# Patient Record
Sex: FEMALE | Race: WHITE | HISPANIC OR LATINO | Employment: FULL TIME | URBAN - METROPOLITAN AREA
[De-identification: names, ages, dates, MRNs, and addresses within clinical notes are randomized per-mention and may not be internally consistent; named-entity substitution may affect disease eponyms.]

---

## 2017-01-27 ENCOUNTER — COMMUNICATION - HEALTHEAST (OUTPATIENT)
Dept: ADMINISTRATIVE | Facility: CLINIC | Age: 27
End: 2017-01-27

## 2017-02-06 ENCOUNTER — COMMUNICATION - HEALTHEAST (OUTPATIENT)
Dept: MIDWIFE SERVICES | Facility: CLINIC | Age: 27
End: 2017-02-06

## 2017-02-06 DIAGNOSIS — Z34.82 ENCOUNTER FOR SUPERVISION OF OTHER NORMAL PREGNANCY IN SECOND TRIMESTER: ICD-10-CM

## 2017-02-07 ENCOUNTER — HOSPITAL ENCOUNTER (OUTPATIENT)
Dept: ULTRASOUND IMAGING | Facility: CLINIC | Age: 27
Discharge: HOME | End: 2017-02-07
Attending: ADVANCED PRACTICE MIDWIFE

## 2017-02-07 ENCOUNTER — COMMUNICATION - HEALTHEAST (OUTPATIENT)
Dept: TELEHEALTH | Facility: CLINIC | Age: 27
End: 2017-02-07

## 2017-02-07 DIAGNOSIS — Z34.82 ENCOUNTER FOR SUPERVISION OF OTHER NORMAL PREGNANCY IN SECOND TRIMESTER: ICD-10-CM

## 2017-02-13 ENCOUNTER — PRENATAL OFFICE VISIT - HEALTHEAST (OUTPATIENT)
Dept: MIDWIFE SERVICES | Facility: CLINIC | Age: 27
End: 2017-02-13

## 2017-02-13 DIAGNOSIS — Z34.80 SUPERVISION OF OTHER NORMAL PREGNANCY, ANTEPARTUM: ICD-10-CM

## 2017-02-13 DIAGNOSIS — O09.899 RUBELLA NON-IMMUNE STATUS, ANTEPARTUM: ICD-10-CM

## 2017-02-13 DIAGNOSIS — Z28.39 RUBELLA NON-IMMUNE STATUS, ANTEPARTUM: ICD-10-CM

## 2017-02-13 DIAGNOSIS — F32.89 DEPRESSIVE DISORDER, NOT ELSEWHERE CLASSIFIED: ICD-10-CM

## 2017-02-13 DIAGNOSIS — O09.30 LATE PRENATAL CARE: ICD-10-CM

## 2017-02-13 DIAGNOSIS — J45.909 ASTHMA: ICD-10-CM

## 2017-02-13 DIAGNOSIS — F41.1 GENERALIZED ANXIETY DISORDER: ICD-10-CM

## 2017-02-13 ASSESSMENT — MIFFLIN-ST. JEOR: SCORE: 1374.52

## 2017-02-15 ENCOUNTER — COMMUNICATION - HEALTHEAST (OUTPATIENT)
Dept: MIDWIFE SERVICES | Facility: CLINIC | Age: 27
End: 2017-02-15

## 2017-02-16 ENCOUNTER — AMBULATORY - HEALTHEAST (OUTPATIENT)
Dept: MIDWIFE SERVICES | Facility: CLINIC | Age: 27
End: 2017-02-16

## 2017-02-16 DIAGNOSIS — G43.909 MIGRAINE HEADACHE: ICD-10-CM

## 2017-02-17 ENCOUNTER — AMBULATORY - HEALTHEAST (OUTPATIENT)
Dept: LAB | Facility: CLINIC | Age: 27
End: 2017-02-17

## 2017-02-17 DIAGNOSIS — Z34.80 SUPERVISION OF OTHER NORMAL PREGNANCY, ANTEPARTUM: ICD-10-CM

## 2017-02-17 LAB — HIV 1+2 AB+HIV1 P24 AG SERPL QL IA: NEGATIVE

## 2017-02-18 ENCOUNTER — AMBULATORY - HEALTHEAST (OUTPATIENT)
Dept: OBGYN | Facility: CLINIC | Age: 27
End: 2017-02-18

## 2017-02-18 DIAGNOSIS — G43.909 MIGRAINE HEADACHE: ICD-10-CM

## 2017-02-18 LAB
HBV SURFACE AG SERPL QL IA: NEGATIVE
SYPHILIS RPR SCREEN - HISTORICAL: NORMAL

## 2017-02-20 LAB — HCV AB SERPL QL IA: NEGATIVE

## 2017-02-27 ENCOUNTER — COMMUNICATION - HEALTHEAST (OUTPATIENT)
Dept: OBGYN | Facility: CLINIC | Age: 27
End: 2017-02-27

## 2017-03-13 ENCOUNTER — PRENATAL OFFICE VISIT - HEALTHEAST (OUTPATIENT)
Dept: MIDWIFE SERVICES | Facility: CLINIC | Age: 27
End: 2017-03-13

## 2017-03-13 DIAGNOSIS — Z34.80 SUPERVISION OF OTHER NORMAL PREGNANCY, ANTEPARTUM: ICD-10-CM

## 2017-03-13 DIAGNOSIS — F41.1 GENERALIZED ANXIETY DISORDER: ICD-10-CM

## 2017-03-13 DIAGNOSIS — F32.89 DEPRESSIVE DISORDER, NOT ELSEWHERE CLASSIFIED: ICD-10-CM

## 2017-03-13 ASSESSMENT — MIFFLIN-ST. JEOR: SCORE: 1410.81

## 2017-03-14 LAB — SYPHILIS RPR SCREEN - HISTORICAL: NORMAL

## 2017-03-16 ENCOUNTER — AMBULATORY - HEALTHEAST (OUTPATIENT)
Dept: OBGYN | Facility: CLINIC | Age: 27
End: 2017-03-16

## 2017-03-17 ENCOUNTER — COMMUNICATION - HEALTHEAST (OUTPATIENT)
Dept: ADMINISTRATIVE | Facility: CLINIC | Age: 27
End: 2017-03-17

## 2017-03-27 ENCOUNTER — HOSPITAL ENCOUNTER (OUTPATIENT)
Dept: ULTRASOUND IMAGING | Facility: CLINIC | Age: 27
Discharge: HOME | End: 2017-03-27
Attending: MIDWIFE

## 2017-03-27 ENCOUNTER — COMMUNICATION - HEALTHEAST (OUTPATIENT)
Dept: ADMINISTRATIVE | Facility: CLINIC | Age: 27
End: 2017-03-27

## 2017-03-27 ENCOUNTER — PRENATAL OFFICE VISIT - HEALTHEAST (OUTPATIENT)
Dept: MIDWIFE SERVICES | Facility: CLINIC | Age: 27
End: 2017-03-27

## 2017-03-27 DIAGNOSIS — O26.893 RH NEGATIVE STATE IN ANTEPARTUM PERIOD, THIRD TRIMESTER: ICD-10-CM

## 2017-03-27 DIAGNOSIS — F32.89 DEPRESSIVE DISORDER, NOT ELSEWHERE CLASSIFIED: ICD-10-CM

## 2017-03-27 DIAGNOSIS — Z34.80 SUPERVISION OF OTHER NORMAL PREGNANCY, ANTEPARTUM: ICD-10-CM

## 2017-03-27 DIAGNOSIS — F41.1 GENERALIZED ANXIETY DISORDER: ICD-10-CM

## 2017-03-27 DIAGNOSIS — Z67.91 RH NEGATIVE STATE IN ANTEPARTUM PERIOD, THIRD TRIMESTER: ICD-10-CM

## 2017-03-27 ASSESSMENT — MIFFLIN-ST. JEOR: SCORE: 1410.81

## 2017-03-28 ENCOUNTER — COMMUNICATION - HEALTHEAST (OUTPATIENT)
Dept: ADMINISTRATIVE | Facility: CLINIC | Age: 27
End: 2017-03-28

## 2017-04-04 ENCOUNTER — COMMUNICATION - HEALTHEAST (OUTPATIENT)
Dept: ADMINISTRATIVE | Facility: CLINIC | Age: 27
End: 2017-04-04

## 2017-04-20 ENCOUNTER — COMMUNICATION - HEALTHEAST (OUTPATIENT)
Dept: MIDWIFE SERVICES | Facility: CLINIC | Age: 27
End: 2017-04-20

## 2017-04-24 ENCOUNTER — PRENATAL OFFICE VISIT - HEALTHEAST (OUTPATIENT)
Dept: MIDWIFE SERVICES | Facility: CLINIC | Age: 27
End: 2017-04-24

## 2017-04-24 DIAGNOSIS — Z34.80 SUPERVISION OF OTHER NORMAL PREGNANCY, ANTEPARTUM: ICD-10-CM

## 2017-04-24 DIAGNOSIS — M79.643 HAND PAIN: ICD-10-CM

## 2017-04-24 DIAGNOSIS — F41.1 GENERALIZED ANXIETY DISORDER: ICD-10-CM

## 2017-04-24 DIAGNOSIS — F32.89 DEPRESSIVE DISORDER, NOT ELSEWHERE CLASSIFIED: ICD-10-CM

## 2017-04-24 ASSESSMENT — MIFFLIN-ST. JEOR: SCORE: 1424.42

## 2017-04-25 ENCOUNTER — COMMUNICATION - HEALTHEAST (OUTPATIENT)
Dept: ADMINISTRATIVE | Facility: CLINIC | Age: 27
End: 2017-04-25

## 2017-04-25 ENCOUNTER — COMMUNICATION - HEALTHEAST (OUTPATIENT)
Dept: OBGYN | Facility: CLINIC | Age: 27
End: 2017-04-25

## 2017-04-25 DIAGNOSIS — K08.89 TOOTH PAIN: ICD-10-CM

## 2017-05-02 ENCOUNTER — AMBULATORY - HEALTHEAST (OUTPATIENT)
Dept: MIDWIFE SERVICES | Facility: CLINIC | Age: 27
End: 2017-05-02

## 2017-05-15 ENCOUNTER — PRENATAL OFFICE VISIT - HEALTHEAST (OUTPATIENT)
Dept: MIDWIFE SERVICES | Facility: CLINIC | Age: 27
End: 2017-05-15

## 2017-05-15 ENCOUNTER — COMMUNICATION - HEALTHEAST (OUTPATIENT)
Dept: ADMINISTRATIVE | Facility: CLINIC | Age: 27
End: 2017-05-15

## 2017-05-15 DIAGNOSIS — Z34.80 SUPERVISION OF OTHER NORMAL PREGNANCY, ANTEPARTUM: ICD-10-CM

## 2017-05-15 DIAGNOSIS — F32.89 DEPRESSIVE DISORDER, NOT ELSEWHERE CLASSIFIED: ICD-10-CM

## 2017-05-15 DIAGNOSIS — F41.1 GENERALIZED ANXIETY DISORDER: ICD-10-CM

## 2017-05-15 ASSESSMENT — MIFFLIN-ST. JEOR: SCORE: 1456.17

## 2017-05-16 ENCOUNTER — COMMUNICATION - HEALTHEAST (OUTPATIENT)
Dept: OBGYN | Facility: CLINIC | Age: 27
End: 2017-05-16

## 2017-05-16 DIAGNOSIS — G43.909 MIGRAINE HEADACHE: ICD-10-CM

## 2017-05-17 ENCOUNTER — HOSPITAL ENCOUNTER (OUTPATIENT)
Dept: ADMINISTRATIVE | Facility: OTHER | Age: 27
Discharge: HOME | End: 2017-05-17
Attending: ADVANCED PRACTICE MIDWIFE | Admitting: ADVANCED PRACTICE MIDWIFE

## 2017-05-17 DIAGNOSIS — O99.891 BACK PAIN AFFECTING PREGNANCY: ICD-10-CM

## 2017-05-17 DIAGNOSIS — M54.9 BACK PAIN AFFECTING PREGNANCY: ICD-10-CM

## 2017-05-17 ASSESSMENT — MIFFLIN-ST. JEOR: SCORE: 1464.1

## 2017-05-22 ENCOUNTER — PRENATAL OFFICE VISIT - HEALTHEAST (OUTPATIENT)
Dept: MIDWIFE SERVICES | Facility: CLINIC | Age: 27
End: 2017-05-22

## 2017-05-22 DIAGNOSIS — Z34.80 SUPERVISION OF OTHER NORMAL PREGNANCY, ANTEPARTUM: ICD-10-CM

## 2017-05-22 ASSESSMENT — MIFFLIN-ST. JEOR: SCORE: 1477.71

## 2017-05-28 ENCOUNTER — HOSPITAL ENCOUNTER (OUTPATIENT)
Dept: ADMINISTRATIVE | Facility: OTHER | Age: 27
Discharge: HOME | End: 2017-05-28
Attending: ADVANCED PRACTICE MIDWIFE | Admitting: ADVANCED PRACTICE MIDWIFE

## 2017-05-28 ASSESSMENT — MIFFLIN-ST. JEOR: SCORE: 1477.71

## 2017-05-30 ENCOUNTER — PRENATAL OFFICE VISIT - HEALTHEAST (OUTPATIENT)
Dept: MIDWIFE SERVICES | Facility: CLINIC | Age: 27
End: 2017-05-30

## 2017-05-30 DIAGNOSIS — O26.893 RH NEGATIVE STATE IN ANTEPARTUM PERIOD, THIRD TRIMESTER: ICD-10-CM

## 2017-05-30 DIAGNOSIS — Z67.91 RH NEGATIVE STATE IN ANTEPARTUM PERIOD, THIRD TRIMESTER: ICD-10-CM

## 2017-05-30 DIAGNOSIS — F41.1 GENERALIZED ANXIETY DISORDER: ICD-10-CM

## 2017-05-30 DIAGNOSIS — Z34.80 SUPERVISION OF OTHER NORMAL PREGNANCY, ANTEPARTUM: ICD-10-CM

## 2017-05-30 ASSESSMENT — MIFFLIN-ST. JEOR: SCORE: 1491.32

## 2017-06-02 ENCOUNTER — COMMUNICATION - HEALTHEAST (OUTPATIENT)
Dept: OBGYN | Facility: CLINIC | Age: 27
End: 2017-06-02

## 2017-06-02 ENCOUNTER — HOSPITAL ENCOUNTER (OUTPATIENT)
Dept: ADMINISTRATIVE | Facility: OTHER | Age: 27
Discharge: HOME | End: 2017-06-02
Attending: FAMILY MEDICINE | Admitting: ADVANCED PRACTICE MIDWIFE

## 2017-06-06 ENCOUNTER — ANESTHESIA - HEALTHEAST (OUTPATIENT)
Dept: ADMINISTRATIVE | Facility: OTHER | Age: 27
End: 2017-06-06

## 2017-06-08 ENCOUNTER — HOME CARE/HOSPICE - HEALTHEAST (OUTPATIENT)
Dept: HOME HEALTH SERVICES | Facility: HOME HEALTH | Age: 27
End: 2017-06-08

## 2017-06-11 ENCOUNTER — COMMUNICATION - HEALTHEAST (OUTPATIENT)
Dept: OBGYN | Facility: CLINIC | Age: 27
End: 2017-06-11

## 2017-07-18 ENCOUNTER — COMMUNICATION - HEALTHEAST (OUTPATIENT)
Dept: MIDWIFE SERVICES | Facility: CLINIC | Age: 27
End: 2017-07-18

## 2018-08-07 ENCOUNTER — OFFICE VISIT - HEALTHEAST (OUTPATIENT)
Dept: MIDWIFE SERVICES | Facility: CLINIC | Age: 28
End: 2018-08-07

## 2018-08-07 DIAGNOSIS — N92.6 IRREGULAR PERIODS: ICD-10-CM

## 2018-08-07 DIAGNOSIS — Z83.49 FAMILY HISTORY OF THYROID DISEASE IN MOTHER: ICD-10-CM

## 2018-08-07 DIAGNOSIS — N93.9 VAGINAL BLEEDING: ICD-10-CM

## 2018-08-07 DIAGNOSIS — N89.8 VAGINAL DISCHARGE: ICD-10-CM

## 2018-08-07 DIAGNOSIS — Z01.419 ENCOUNTER FOR CERVICAL PAP SMEAR WITH PELVIC EXAM: ICD-10-CM

## 2018-08-07 LAB
CLUE CELLS: NORMAL
HCG SERPL-ACNC: <2 MLU/ML (ref 0–4)
TRICHOMONAS, WET PREP: NORMAL
TSH SERPL DL<=0.005 MIU/L-ACNC: 1.35 UIU/ML (ref 0.3–5)
YEAST, WET PREP: NORMAL

## 2018-08-07 ASSESSMENT — MIFFLIN-ST. JEOR: SCORE: 1343.44

## 2018-08-08 LAB
C TRACH DNA SPEC QL PROBE+SIG AMP: NEGATIVE
N GONORRHOEA DNA SPEC QL NAA+PROBE: NEGATIVE

## 2018-08-09 LAB
BKR LAB AP ABNORMAL BLEEDING: YES
BKR LAB AP BIRTH CONTROL/HORMONES: ABNORMAL
BKR LAB AP CERVICAL APPEARANCE: ABNORMAL
BKR LAB AP GYN ADEQUACY: ABNORMAL
BKR LAB AP GYN INTERPRETATION: ABNORMAL
BKR LAB AP HPV REFLEX: ABNORMAL
BKR LAB AP LMP: ABNORMAL
BKR LAB AP PATIENT STATUS: ABNORMAL
BKR LAB AP PREVIOUS ABNORMAL: ABNORMAL
BKR LAB AP PREVIOUS NORMAL: ABNORMAL
HIGH RISK?: NO
PATH REPORT.COMMENTS IMP SPEC: ABNORMAL
RESULT FLAG (HE HISTORICAL CONVERSION): ABNORMAL

## 2018-08-10 LAB
HPV SOURCE: NORMAL
HUMAN PAPILLOMA VIRUS 16 DNA: NEGATIVE
HUMAN PAPILLOMA VIRUS 18 DNA: NEGATIVE
HUMAN PAPILLOMA VIRUS FINAL DIAGNOSIS: NORMAL
HUMAN PAPILLOMA VIRUS OTHER HR: NEGATIVE
SPECIMEN DESCRIPTION: NORMAL

## 2018-08-13 ENCOUNTER — COMMUNICATION - HEALTHEAST (OUTPATIENT)
Dept: MIDWIFE SERVICES | Facility: CLINIC | Age: 28
End: 2018-08-13

## 2018-08-13 ENCOUNTER — COMMUNICATION - HEALTHEAST (OUTPATIENT)
Dept: ADMINISTRATIVE | Facility: CLINIC | Age: 28
End: 2018-08-13

## 2018-08-14 ENCOUNTER — COMMUNICATION - HEALTHEAST (OUTPATIENT)
Dept: OBGYN | Facility: CLINIC | Age: 28
End: 2018-08-14

## 2018-08-17 ENCOUNTER — COMMUNICATION - HEALTHEAST (OUTPATIENT)
Dept: MIDWIFE SERVICES | Facility: CLINIC | Age: 28
End: 2018-08-17

## 2018-09-17 ENCOUNTER — APPOINTMENT (OUTPATIENT)
Dept: ULTRASOUND IMAGING | Facility: CLINIC | Age: 28
End: 2018-09-17
Attending: EMERGENCY MEDICINE
Payer: COMMERCIAL

## 2018-09-17 ENCOUNTER — APPOINTMENT (OUTPATIENT)
Dept: CT IMAGING | Facility: CLINIC | Age: 28
End: 2018-09-17
Attending: EMERGENCY MEDICINE
Payer: COMMERCIAL

## 2018-09-17 ENCOUNTER — HOSPITAL ENCOUNTER (EMERGENCY)
Facility: CLINIC | Age: 28
Discharge: HOME | End: 2018-09-17
Attending: EMERGENCY MEDICINE | Admitting: EMERGENCY MEDICINE
Payer: COMMERCIAL

## 2018-09-17 VITALS
HEART RATE: 90 BPM | SYSTOLIC BLOOD PRESSURE: 92 MMHG | TEMPERATURE: 97.9 F | OXYGEN SATURATION: 96 % | DIASTOLIC BLOOD PRESSURE: 53 MMHG | WEIGHT: 132.28 LBS

## 2018-09-17 DIAGNOSIS — N23 RENAL COLIC: ICD-10-CM

## 2018-09-17 LAB
ALBUMIN SERPL-MCNC: 4 G/DL (ref 3.4–5)
ALBUMIN UR-MCNC: NEGATIVE MG/DL
ALP SERPL-CCNC: 46 U/L (ref 40–150)
ALT SERPL W P-5'-P-CCNC: 15 U/L (ref 0–50)
ANION GAP SERPL CALCULATED.3IONS-SCNC: 9 MMOL/L (ref 3–14)
APPEARANCE UR: ABNORMAL
AST SERPL W P-5'-P-CCNC: 14 U/L (ref 0–45)
BACTERIA #/AREA URNS HPF: ABNORMAL /HPF
BASOPHILS # BLD AUTO: 0 10E9/L (ref 0–0.2)
BASOPHILS NFR BLD AUTO: 0.6 %
BILIRUB SERPL-MCNC: 0.3 MG/DL (ref 0.2–1.3)
BILIRUB UR QL STRIP: NEGATIVE
BUN SERPL-MCNC: 12 MG/DL (ref 7–30)
CALCIUM SERPL-MCNC: 8.8 MG/DL (ref 8.5–10.1)
CHLORIDE SERPL-SCNC: 105 MMOL/L (ref 94–109)
CO2 SERPL-SCNC: 24 MMOL/L (ref 20–32)
COLOR UR AUTO: YELLOW
CREAT SERPL-MCNC: 0.67 MG/DL (ref 0.52–1.04)
DIFFERENTIAL METHOD BLD: ABNORMAL
EOSINOPHIL # BLD AUTO: 0.2 10E9/L (ref 0–0.7)
EOSINOPHIL NFR BLD AUTO: 2.6 %
ERYTHROCYTE [DISTWIDTH] IN BLOOD BY AUTOMATED COUNT: 17.3 % (ref 10–15)
GFR SERPL CREATININE-BSD FRML MDRD: >90 ML/MIN/1.7M2
GLUCOSE SERPL-MCNC: 87 MG/DL (ref 70–99)
GLUCOSE UR STRIP-MCNC: NEGATIVE MG/DL
HCG UR QL: NEGATIVE
HCT VFR BLD AUTO: 33 % (ref 35–47)
HGB BLD-MCNC: 9.9 G/DL (ref 11.7–15.7)
HGB UR QL STRIP: NEGATIVE
IMM GRANULOCYTES # BLD: 0 10E9/L (ref 0–0.4)
IMM GRANULOCYTES NFR BLD: 0.5 %
KETONES UR STRIP-MCNC: NEGATIVE MG/DL
LEUKOCYTE ESTERASE UR QL STRIP: NEGATIVE
LIPASE SERPL-CCNC: 314 U/L (ref 73–393)
LYMPHOCYTES # BLD AUTO: 2.9 10E9/L (ref 0.8–5.3)
LYMPHOCYTES NFR BLD AUTO: 44.2 %
MCH RBC QN AUTO: 22.7 PG (ref 26.5–33)
MCHC RBC AUTO-ENTMCNC: 30 G/DL (ref 31.5–36.5)
MCV RBC AUTO: 76 FL (ref 78–100)
MONOCYTES # BLD AUTO: 0.5 10E9/L (ref 0–1.3)
MONOCYTES NFR BLD AUTO: 7.5 %
MUCOUS THREADS #/AREA URNS LPF: PRESENT /LPF
NEUTROPHILS # BLD AUTO: 2.9 10E9/L (ref 1.6–8.3)
NEUTROPHILS NFR BLD AUTO: 44.6 %
NITRATE UR QL: NEGATIVE
NRBC # BLD AUTO: 0 10*3/UL
NRBC BLD AUTO-RTO: 0 /100
PH UR STRIP: 5 PH (ref 5–7)
PLATELET # BLD AUTO: 337 10E9/L (ref 150–450)
POTASSIUM SERPL-SCNC: 3.7 MMOL/L (ref 3.4–5.3)
PROT SERPL-MCNC: 7.2 G/DL (ref 6.8–8.8)
RBC # BLD AUTO: 4.36 10E12/L (ref 3.8–5.2)
RBC #/AREA URNS AUTO: 1 /HPF (ref 0–2)
SODIUM SERPL-SCNC: 138 MMOL/L (ref 133–144)
SOURCE: ABNORMAL
SP GR UR STRIP: 1.03 (ref 1–1.03)
SQUAMOUS #/AREA URNS AUTO: 15 /HPF (ref 0–1)
UROBILINOGEN UR STRIP-MCNC: 0 MG/DL (ref 0–2)
WBC # BLD AUTO: 6.5 10E9/L (ref 4–11)
WBC #/AREA URNS AUTO: 5 /HPF (ref 0–5)

## 2018-09-17 PROCEDURE — 80053 COMPREHEN METABOLIC PANEL: CPT | Performed by: EMERGENCY MEDICINE

## 2018-09-17 PROCEDURE — 81025 URINE PREGNANCY TEST: CPT | Performed by: EMERGENCY MEDICINE

## 2018-09-17 PROCEDURE — 25000128 H RX IP 250 OP 636: Performed by: EMERGENCY MEDICINE

## 2018-09-17 PROCEDURE — 99285 EMERGENCY DEPT VISIT HI MDM: CPT | Mod: 25

## 2018-09-17 PROCEDURE — 96361 HYDRATE IV INFUSION ADD-ON: CPT

## 2018-09-17 PROCEDURE — 96374 THER/PROPH/DIAG INJ IV PUSH: CPT | Mod: 59

## 2018-09-17 PROCEDURE — 76775 US EXAM ABDO BACK WALL LIM: CPT

## 2018-09-17 PROCEDURE — 96375 TX/PRO/DX INJ NEW DRUG ADDON: CPT

## 2018-09-17 PROCEDURE — 76830 TRANSVAGINAL US NON-OB: CPT

## 2018-09-17 PROCEDURE — 74177 CT ABD & PELVIS W/CONTRAST: CPT

## 2018-09-17 PROCEDURE — 81001 URINALYSIS AUTO W/SCOPE: CPT | Performed by: EMERGENCY MEDICINE

## 2018-09-17 PROCEDURE — 85025 COMPLETE CBC W/AUTO DIFF WBC: CPT | Performed by: EMERGENCY MEDICINE

## 2018-09-17 PROCEDURE — 83690 ASSAY OF LIPASE: CPT | Performed by: EMERGENCY MEDICINE

## 2018-09-17 RX ORDER — ONDANSETRON 4 MG/1
4 TABLET, ORALLY DISINTEGRATING ORAL EVERY 8 HOURS PRN
Qty: 10 TABLET | Refills: 0 | Status: SHIPPED | OUTPATIENT
Start: 2018-09-17 | End: 2018-09-20

## 2018-09-17 RX ORDER — OXYCODONE AND ACETAMINOPHEN 5; 325 MG/1; MG/1
1-2 TABLET ORAL EVERY 4 HOURS PRN
Qty: 12 TABLET | Refills: 0 | Status: SHIPPED | OUTPATIENT
Start: 2018-09-17 | End: 2022-08-02

## 2018-09-17 RX ORDER — ALPRAZOLAM 1 MG
TABLET ORAL
COMMUNITY
Start: 2018-05-02 | End: 2022-08-03

## 2018-09-17 RX ORDER — HYDROMORPHONE HYDROCHLORIDE 1 MG/ML
0.5 INJECTION, SOLUTION INTRAMUSCULAR; INTRAVENOUS; SUBCUTANEOUS
Status: DISCONTINUED | OUTPATIENT
Start: 2018-09-17 | End: 2018-09-17 | Stop reason: HOSPADM

## 2018-09-17 RX ORDER — SODIUM CHLORIDE, SODIUM LACTATE, POTASSIUM CHLORIDE, CALCIUM CHLORIDE 600; 310; 30; 20 MG/100ML; MG/100ML; MG/100ML; MG/100ML
1000 INJECTION, SOLUTION INTRAVENOUS CONTINUOUS
Status: DISCONTINUED | OUTPATIENT
Start: 2018-09-17 | End: 2018-09-17 | Stop reason: HOSPADM

## 2018-09-17 RX ORDER — TAMSULOSIN HYDROCHLORIDE 0.4 MG/1
0.4 CAPSULE ORAL ONCE
Status: DISCONTINUED | OUTPATIENT
Start: 2018-09-17 | End: 2018-09-17 | Stop reason: HOSPADM

## 2018-09-17 RX ORDER — KETOROLAC TROMETHAMINE 15 MG/ML
10 INJECTION, SOLUTION INTRAMUSCULAR; INTRAVENOUS ONCE
Status: COMPLETED | OUTPATIENT
Start: 2018-09-17 | End: 2018-09-17

## 2018-09-17 RX ORDER — IOPAMIDOL 755 MG/ML
500 INJECTION, SOLUTION INTRAVASCULAR ONCE
Status: COMPLETED | OUTPATIENT
Start: 2018-09-17 | End: 2018-09-17

## 2018-09-17 RX ORDER — ONDANSETRON 2 MG/ML
4 INJECTION INTRAMUSCULAR; INTRAVENOUS EVERY 30 MIN PRN
Status: DISCONTINUED | OUTPATIENT
Start: 2018-09-17 | End: 2018-09-17 | Stop reason: HOSPADM

## 2018-09-17 RX ORDER — TAMSULOSIN HYDROCHLORIDE 0.4 MG/1
0.4 CAPSULE ORAL DAILY
Qty: 10 CAPSULE | Refills: 0 | Status: SHIPPED | OUTPATIENT
Start: 2018-09-17 | End: 2018-09-27

## 2018-09-17 RX ADMIN — SODIUM CHLORIDE, POTASSIUM CHLORIDE, SODIUM LACTATE AND CALCIUM CHLORIDE 1000 ML: 600; 310; 30; 20 INJECTION, SOLUTION INTRAVENOUS at 03:44

## 2018-09-17 RX ADMIN — Medication 0.5 MG: at 03:48

## 2018-09-17 RX ADMIN — ONDANSETRON 4 MG: 2 INJECTION INTRAMUSCULAR; INTRAVENOUS at 03:44

## 2018-09-17 RX ADMIN — IOPAMIDOL 66 ML: 755 INJECTION, SOLUTION INTRAVENOUS at 05:20

## 2018-09-17 RX ADMIN — KETOROLAC TROMETHAMINE 10 MG: 15 INJECTION, SOLUTION INTRAMUSCULAR; INTRAVENOUS at 04:53

## 2018-09-17 RX ADMIN — SODIUM CHLORIDE 57 ML: 9 INJECTION, SOLUTION INTRAVENOUS at 05:20

## 2018-09-17 ASSESSMENT — ENCOUNTER SYMPTOMS
ABDOMINAL PAIN: 1
NAUSEA: 1
HEMATURIA: 0
FLANK PAIN: 1
DYSURIA: 0
BACK PAIN: 1

## 2018-09-17 NOTE — ED PROVIDER NOTES
History     Chief Complaint:    Abdominal Pain      HPI   Kary Sebastian is a 27 year old female with a history of CKD and kidney stones who presents to the ED for evaluation of abdominal pain. The patient states that she woke up about 20 minutes prior to presenting with severe lower abdominal, right flank, and right lower back pain. She states that her symptoms are similar to her previous kidney stone. She also complains of nausea. She denies any symptoms of dysuria, hematuria, vaginal bleeding, or vaginal discharge. Of note, she states her last menstrual period was about two weeks ago.     Allergies:  Codeine     Medications:    Xanax     Past Medical History:    CKD  Asthma  Kidney stones    Past Surgical History:    The patient does not have any pertinent past surgical history.    Family History:    No past pertinent family history.    Social History:  Current every day smoker.   Negative for alcohol use.  Marital Status:  Single [1]     Review of Systems   Gastrointestinal: Positive for abdominal pain and nausea.   Genitourinary: Positive for flank pain. Negative for dysuria, hematuria, vaginal bleeding and vaginal discharge.   Musculoskeletal: Positive for back pain.   All other systems reviewed and are negative.      Physical Exam   First Vitals:  BP: 134/86  Heart Rate: 79  Temp: 97.9  F (36.6  C)  Weight: 60 kg (132 lb 4.4 oz)  SpO2: 100 %      Physical Exam  Constitutional:  Oriented to person, place, and time. In distress secondary to pain  HENT:   Head:    Normocephalic.   Mouth/Throat:   Oropharynx is clear and moist.   Eyes:    EOM are normal. Pupils are equal, round, and reactive to light.   Neck:    Neck supple.   Cardiovascular:  Normal rate, regular rhythm and normal heart sounds.      Exam reveals no gallop and no friction rub.       No murmur heard.  Pulmonary/Chest:  Effort normal and breath sounds normal.      No respiratory distress. No wheezes. No rales.      No reproducible chest wall  pain.  Abdominal:   Soft. No distension. No rebound and no guarding. Mild pain with percussion to right lower flank. Abdomen is tender to percussion.   Musculoskeletal:  Normal range of motion.   Neurological:   Alert and oriented to person, place, and time.           Moves all 4 extremities spontaneously    Skin:    No rash noted. No pallor.     Emergency Department Course         Imaging:  Radiographic findings were communicated with the patient who voiced understanding of the findings.  US Pelvic, Complete w Transvaginal & Abd/Pel Duplex Limited:  Normal pelvic US as per radiology.     US Renal Limited:  Normal renal US as per radiology.    CT Abdomen pelvis with contrast:   1. Calcification in the upper right hemipelvis is in the correct  location to be within the ureter (image 56, series 2), however, there  is no hydronephrosis and the ureter is difficult to discretely  identify at this level. This calcification might also be a phlebolith.    2. Normal appendix. No other specific finding in the abdomen/pelvis to  explain pain as per radiology.      Laboratory:  CBC: WBC: 6.5, HGB: 9.9 (L), PLT: 337  CMP: Glucose 87, o/w WNL (Creatinine: 0.67)    Lipase: 314  HCG: negative  UA with Microscopic: Bacteria Many (A), Squamous epithelial 15 (H), Mucous Present (A), o/w WNL      Interventions:  0344 NS 1,000 mLs IV   Zofran 4 mg IV  0348 Dilaudid 0.5 mg IV  0453 Toradol 10 mg IV      Emergency Department Course:  Nursing notes and vitals reviewed. (6910) I performed an exam of the patient as documented above.     IV inserted. Medicine administered as documented above. Blood drawn. This was sent to the lab for further testing, results above.     The patient was sent for a Abdomen/Pelvis CT, Pelvis US and Renal US while in the emergency department, findings above.     0522 I rechecked the patient and discussed the results of her workup thus far.     Findings and plan explained to the Patient. Patient discharged home  with instructions regarding supportive care, medications, and reasons to return. The importance of close follow-up was reviewed. The patient was prescribed Zofran, Percocet, Flomax.    I personally reviewed the laboratory results with the Patient and answered all related questions prior to discharge.         Impression & Plan    Medical Decision Making:  Kary Sebastian is a 27 year old female who presents with flank pain.  A broad differential diagnosis was considered including diverticulitis, ureterolithiasis, tumor, colitis, cholecystitis, aneurysm, dissection, volvulus, appendicitis, splenic issue, stomach pathology, ulcer, hydronephrosis, pneumonia, rib fracture, UTI, pyelonephritis, ectopic, ovarian cyst or torsion and pregnancy amongst many other etiologies.  Signs and symptoms consistent with ureterolithiasis.  Patients pain is controlled in ED and given flomax.  No signs of infected stone.  Patient is hemodynamically stable in ED. Plan is home with abdominal pain recheck by primary care physician or return to ED if symptoms worsen.  Return for fevers greater than 102, increasing pain, other new symptoms develop.  Ureterolithiasis precautions for home.  Questions were answered.     Diagnosis:    ICD-10-CM    1. Renal colic N23        Disposition:  discharged to home    Discharge Medications:  New Prescriptions    ONDANSETRON (ZOFRAN ODT) 4 MG ODT TAB    Take 1 tablet (4 mg) by mouth every 8 hours as needed for nausea    OXYCODONE-ACETAMINOPHEN (PERCOCET) 5-325 MG PER TABLET    Take 1-2 tablets by mouth every 4 hours as needed for pain    TAMSULOSIN (FLOMAX) 0.4 MG CAPSULE    Take 1 capsule (0.4 mg) by mouth daily for 10 doses     Scribe Disclosure:  I,  Israel Salazar, am serving as a scribe on 9/17/2018 at 3:17 AM to personally document services performed by Matt Ramsey MD based on my observations and the provider's statements to me.          Israel Salazar  9/17/2018   [facility] EMERGENCY  DEPARTMENT       Matt Ramsey MD  09/17/18 [id number]

## 2018-09-17 NOTE — ED AVS SNAPSHOT
[facility] Emergency Department    [address]    [facility] [address]    Phone:  [phone]    Fax:  [phone]                                       Kary Sebastian   MRN: [id number]    Department:  [facility] Emergency Department   Date of Visit:  9/17/2018           After Visit Summary Signature Page     I have received my discharge instructions, and my questions have been answered. I have discussed any challenges I see with this plan with the nurse or doctor.    ..........................................................................................................................................  Patient/Patient Representative Signature      ..........................................................................................................................................  Patient Representative Print Name and Relationship to Patient    ..................................................               ................................................  Date                                   Time    ..........................................................................................................................................  Reviewed by Signature/Title    ...................................................              ..............................................  Date                                               Time          22EPIC Rev 08/18

## 2018-09-17 NOTE — ED AVS SNAPSHOT
[facility] Emergency Department    [address]    [facility] [address]    Phone:  [phone]    Fax:  [phone]                                       Kary Sebastian   MRN: [id number]    Department:  [facility] Emergency Department   Date of Visit:  9/17/2018           Patient Information     Date Of Birth          1990        Your diagnoses for this visit were:     Renal colic        You were seen by Matt Ramsey MD.      Follow-up Information     Follow up with [facility]. Call in 3 days.    Contact information:    [address]  [address]  [facility] [address] [phone]        Discharge Instructions         Discharge Instructions  Kidney Stones    Kidney stones are a common problem that can cause a lot of pain but fortunately are usually not dangerous and can be generally treated with medicine at home.  However, sometimes your condition may be worse than it seemed at first, or may get worse with time.     You need to follow-up with your regular doctor within 3 days.    Most kidney stones will pass on their own, but occasionally stones may need to be removed by an urologist. We will send you home with a urine strainer. Be sure to urinate into this, or urinate into a container and pour the urine through the fine filter to catch the kidney stone as it comes out. The stone will seem like a pebble or grain of sand. Be sure to save this in a Ziploc  bag and take it to the doctor s office with you.       Return to the Emergency Department if:    Your pain is not controlled.    You are vomiting and can t keep fluids or medications down.    You develop fever (>101).    You feel much more ill or develop new symptoms.  What can I do to help myself?    Be sure to drink plenty of fluids.    Staying active is good, and may help the stone to pass. You may do whatever you feel up to doing without restrictions.    Treatment:    Non-steroidal anti-inflammatory drugs (NSAIDs). This includes prescription medicines like Toradol  (ketorolac) and non-prescription medicines like Advil  (ibuprofen) and Nuprin  (ibuprofen). These pain relievers are very effective for kidney stones.    Narcotic pain pills. If you have been given a narcotic such as Vicodin  (hydrocodone with acetaminophen), Percocet  (oxycodone with acetaminophen), or codeine, do not drive for four hours after you have taken it. If the narcotic contains Tylenol  (acetaminophen), do not take Tylenol  with it. All narcotics will cause constipation, so eat a high fiber diet.      Nausea medication.  Nausea and vomiting are common with kidney stones, so your physician may send you home with medicine for this.     Flomax  (tamsulosin). This medicine is sometimes used for men with prostate problems, but also can help kidney stones to pass. This medicine can lower blood pressure, and you may feel faint, especially when you first stand up. Be sure to get up gradually, sit down if you feel faint, and avoid activity where feeling faint would be dangerous, such as climbing ladders.   If you were given a prescription for medicine here today, be sure to read all of the information (including the package insert) that comes with your prescription.  This will include important information about the medicine, its side effects, and any warnings that you need to know about.  The pharmacist who fills the prescription can provide more information and answer questions you may have about the medicine.  If you have questions or concerns that the pharmacist cannot address, please call or return to the Emergency Department.   Opioid Medication Information    Pain medications are among the most commonly prescribed medicines, so we are including this information for all our patients. If you did not receive pain medication or get a prescription for pain medicine, you can ignore it.     You may  have been given a prescription for an opioid (narcotic) pain medicine and/or have received a pain medicine while here in the Emergency Department. These medicines can make you drowsy or impaired. You must not drive, operate dangerous equipment, or engage in any other dangerous activities while taking these medications. If you drive while taking these medications, you could be arrested for DUI, or driving under the influence. Do not drink any alcohol while you are taking these medications.     Opioid pain medications can cause addiction. If you have a history of chemical dependency of any type, you are at a higher risk of becoming addicted to pain medications.  Only take these prescribed medications to treat your pain when all other options have been tried. Take it for as short a time and as few doses as possible. Store your pain pills in a secure place, as they are frequently stolen and provide a dangerous opportunity for children or visitors in your house to start abusing these powerful medications. We will not replace any lost or stolen medicine.  As soon as your pain is better, you should flush all your remaining medication.     Many prescription pain medications contain Tylenol  (acetaminophen), including Vicodin , Tylenol #3 , Norco , Lortab , and Percocet .  You should not take any extra pills of Tylenol  if you are using these prescription medications or you can get very sick.  Do not ever take more than 3000 mg of acetaminophen in any 24 hour period.    All opioids tend to cause constipation. Drink plenty of water and eat foods that have a lot of fiber, such as fruits, vegetables, prune juice, apple juice and high fiber cereal.  Take a laxative if you don t move your bowels at least every other day. Miralax , Milk of Magnesia, Colace , or Senna  can be used to keep you regular.      Remember that you can always come back to the Emergency Department if you are not able to see your regular doctor in the amount  of time listed above, if you get any new symptoms, or if there is anything that worries you.        24 Hour Appointment Hotline       To make an appointment at any [facility], call [phone] ([phone]). If you don't have a family doctor or clinic, we will help you find one. [facility] are conveniently located to serve the needs of you and your family.             Review of your medicines      START taking        Dose / Directions Last dose taken    ondansetron 4 MG ODT tab   Commonly known as:  ZOFRAN ODT   Dose:  4 mg   Quantity:  10 tablet        Take 1 tablet (4 mg) by mouth every 8 hours as needed for nausea   Refills:  0        oxyCODONE-acetaminophen 5-325 MG per tablet   Commonly known as:  PERCOCET   Dose:  1-2 tablet   Quantity:  12 tablet        Take 1-2 tablets by mouth every 4 hours as needed for pain   Refills:  0        tamsulosin 0.4 MG capsule   Commonly known as:  FLOMAX   Dose:  0.4 mg   Quantity:  10 capsule        Take 1 capsule (0.4 mg) by mouth daily for 10 doses   Refills:  0          Our records show that you are taking the medicines listed below. If these are incorrect, please call your family doctor or clinic.        Dose / Directions Last dose taken    ALPRAZolam 1 MG tablet   Commonly known as:  XANAX        Take 1/2 tab to 1 tab daily as needed for panic attacks   Refills:  0                Information about OPIOIDS     PRESCRIPTION OPIOIDS: WHAT YOU NEED TO KNOW   We gave you an opioid (narcotic) pain medicine. It is important to manage your pain, but opioids are not always the best choice. You should first try all the other options your care team gave you. Take this medicine for as short a time (and as few doses) as possible.    Some activities can increase your pain, such as bandage changes or therapy sessions. It may help to take your pain medicine 30 to 60 minutes before these activities. Reduce your stress by getting enough sleep, working on hobbies you enjoy  and practicing relaxation or meditation. Talk to your care team about ways to manage your pain beyond prescription opioids.    These medicines have risks:    DO NOT drive when on new or higher doses of pain medicine. These medicines can affect your alertness and reaction times, and you could be arrested for driving under the influence (DUI). If you need to use opioids long-term, talk to your care team about driving.    DO NOT operate heavy machinery    DO NOT do any other dangerous activities while taking these medicines.    DO NOT drink any alcohol while taking these medicines.     If the opioid prescribed includes acetaminophen, DO NOT take with any other medicines that contain acetaminophen. Read all labels carefully. Look for the word  acetaminophen  or  Tylenol.  Ask your pharmacist if you have questions or are unsure.    You can get addicted to pain medicines, especially if you have a history of addiction (chemical, alcohol or substance dependence). Talk to your care team about ways to reduce this risk.    All opioids tend to cause constipation. Drink plenty of water and eat foods that have a lot of fiber, such as fruits, vegetables, prune juice, apple juice and high-fiber cereal. Take a laxative (Miralax, milk of magnesia, Colace, Senna) if you don t move your bowels at least every other day. Other side effects include upset stomach, sleepiness, dizziness, throwing up, tolerance (needing more of the medicine to have the same effect), physical dependence and slowed breathing.    Store your pills in a secure place, locked if possible. We will not replace any lost or stolen medicine. If you don t finish your medicine, please throw away (dispose) as directed by your pharmacist. The [place] [employer]ution [employer] has more information about safe disposal: https://www.pca.[facility].[place]/living-green/managing-unwanted-medications        Prescriptions were sent or printed at these locations (3 Prescriptions)                    Other Prescriptions                Printed at Department/Unit printer (3 of 3)         ondansetron (ZOFRAN ODT) 4 MG ODT tab               oxyCODONE-acetaminophen (PERCOCET) 5-325 MG per tablet               tamsulosin (FLOMAX) 0.4 MG capsule                Procedures and tests performed during your visit     CBC with platelets differential    CT Abdomen Pelvis w Contrast    Comprehensive metabolic panel    HCG qualitative urine    Lipase    Peripheral IV: Standard    UA with Microscopic    US Pelvic Complete w Transvaginal & Abd/Pel Duplex Limited    US Renal Limited      Orders Needing Specimen Collection     None      Pending Results     No orders found from 9/15/2018 to 9/18/2018.            Pending Culture Results     No orders found from 9/15/2018 to 9/18/2018.            Pending Results Instructions     If you had any lab results that were not finalized at the time of your Discharge, you can call the ED Lab Result RN at [phone]. You will be contacted by this team for any positive Lab results or changes in treatment. The nurses are available 7 days a week from 10A to 6:30P.  You can leave a message 24 hours per day and they will return your call.        Test Results From Your Hospital Stay        9/17/2018  4:03 AM      Component Results     Component Value Ref Range & Units Status    WBC 6.5 4.0 - 11.0 10e9/L Final    RBC Count 4.36 3.8 - 5.2 10e12/L Final    Hemoglobin 9.9 (L) 11.7 - 15.7 g/dL Final    Hematocrit 33.0 (L) 35.0 - 47.0 % Final    MCV 76 (L) 78 - 100 fl Final    MCH 22.7 (L) 26.5 - 33.0 pg Final    MCHC 30.0 (L) 31.5 - 36.5 g/dL Final    RDW 17.3 (H) 10.0 - 15.0 % Final    Platelet Count 337 150 - 450 10e9/L Final    Diff Method Automated Method  Final    % Neutrophils 44.6 % Final    % Lymphocytes 44.2 % Final    % Monocytes 7.5 % Final    % Eosinophils 2.6 % Final    % Basophils 0.6 % Final    % Immature Granulocytes 0.5 % Final    Nucleated RBCs 0 0 /100 Final    Absolute  Neutrophil 2.9 1.6 - 8.3 10e9/L Final    Absolute Lymphocytes 2.9 0.8 - 5.3 10e9/L Final    Absolute Monocytes 0.5 0.0 - 1.3 10e9/L Final    Absolute Eosinophils 0.2 0.0 - 0.7 10e9/L Final    Absolute Basophils 0.0 0.0 - 0.2 10e9/L Final    Abs Immature Granulocytes 0.0 0 - 0.4 10e9/L Final    Absolute Nucleated RBC 0.0  Final         9/17/2018  4:06 AM      Component Results     Component Value Ref Range & Units Status    Sodium 138 133 - 144 mmol/L Final    Potassium 3.7 3.4 - 5.3 mmol/L Final    Chloride 105 94 - 109 mmol/L Final    Carbon Dioxide 24 20 - 32 mmol/L Final    Anion Gap 9 3 - 14 mmol/L Final    Glucose 87 70 - 99 mg/dL Final    Urea Nitrogen 12 7 - 30 mg/dL Final    Creatinine 0.67 0.52 - 1.04 mg/dL Final    GFR Estimate >90 >60 mL/min/1.7m2 Final    Non  GFR Calc    GFR Estimate If Black >90 >60 mL/min/1.7m2 Final    African American GFR Calc    Calcium 8.8 8.5 - 10.1 mg/dL Final    Bilirubin Total 0.3 0.2 - 1.3 mg/dL Final    Albumin 4.0 3.4 - 5.0 g/dL Final    Protein Total 7.2 6.8 - 8.8 g/dL Final    Alkaline Phosphatase 46 40 - 150 U/L Final    ALT 15 0 - 50 U/L Final    AST 14 0 - 45 U/L Final         9/17/2018  4:06 AM      Component Results     Component Value Ref Range & Units Status    Lipase 314 73 - 393 U/L Final         9/17/2018  3:46 AM      Component Results     Component Value Ref Range & Units Status    HCG Qual Urine Negative NEG^Negative Final    This test is for screening purposes.  Results should be interpreted along with   the clinical picture.  Confirmation testing is available if warranted by   ordering [id number], HCG Quantitative Pregnancy.           9/17/2018  3:54 AM      Component Results     Component Value Ref Range & Units Status    Color Urine Yellow  Final    Appearance Urine Cloudy  Final    Glucose Urine Negative NEG^Negative mg/dL Final    Bilirubin Urine Negative NEG^Negative Final    Ketones Urine Negative NEG^Negative mg/dL Final    Specific  Gravity Urine 1.029 1.003 - 1.035 Final    Blood Urine Negative NEG^Negative Final    pH Urine 5.0 5.0 - 7.0 pH Final    Protein Albumin Urine Negative NEG^Negative mg/dL Final    Urobilinogen mg/dL 0.0 0.0 - 2.0 mg/dL Final    Nitrite Urine Negative NEG^Negative Final    Leukocyte Esterase Urine Negative NEG^Negative Final    Source Midstream Urine  Final    WBC Urine 5 0 - 5 /HPF Final    RBC Urine 1 0 - 2 /HPF Final    Bacteria Urine Many (A) NEG^Negative /HPF Final    Squamous Epithelial /HPF Urine 15 (H) 0 - 1 /HPF Final    Mucous Urine Present (A) NEG^Negative /LPF Final         9/17/2018  4:54 AM      Narrative     US RENAL LIMITED 9/17/2018 4:50 AM    HISTORY: Right flank pain.    COMPARISON: None.    FINDINGS: The right kidney measures 9.5 x 3.9 x 5.4 cm. Cortical  thickness is 1.8 cm. No hydronephrosis. No dominant renal lesion.    The left kidney measures 10.1 x 5.1 x 4.2 cm. Cortical thickness is  1.3 cm. No hydronephrosis. No dominant renal lesion.        Impression     IMPRESSION:  Normal renal ultrasound.     AMADOR CASTLE MD         9/17/2018  4:55 AM      Narrative     US PELVIS COMPLETE W TRANSVAGINAL AND DOPPLER LIMITED 9/17/2018 4:52  AM    HISTORY: Pelvic pain.    COMPARISON: None.    FINDINGS:  Transvaginal images were performed to better evaluate the  patient's uterus, ovaries and endometrial stripe.    The uterus measures 8.9 x 4.3 x 5.5 cm. Endometrial stripe thickness  is normal measuring 1.0 cm. No fibroids are evident.    Right ovary measures 2.4 cm. Small echogenic focus within the right  kidney might represent a tiny hemorrhagic cyst; this measures 0.6 cm.  Left ovary measures 3.1 cm. Ovarian vascularity is normal bilaterally  by waveform Doppler.        Impression     IMPRESSION: Normal pelvic ultrasound.     AMADOR CASTLE MD         9/17/2018  5:40 AM      Narrative     CT ABDOMEN PELVIS W CONTRAST 9/17/2018 5:28 AM    HISTORY: Right flank and lower back pain, similar to previous  episode  of kidney stone.    COMPARISON: None.    TECHNIQUE:  Abdomen and pelvis CT was performed following intravenous  administration of 66mL Isovue-370.  Radiation dose for this scan was  reduced using automated exposure control, adjustment of the mA and/or  kV according to patient size, or iterative reconstruction technique.    FINDINGS: Lung bases are clear.    Liver, gallbladder, spleen, pancreas, adrenal glands and kidneys  appear normal. There is a 0.3 cm stone in the upper right hemipelvis  (image 56, series 2); this might be within the ureter or could be a  benign vascular phlebolith.    Normal caliber bowel. Appendix is visualized and normal. No free air.  No free or loculated fluid collection.    Uterus and adnexa are within normal limits for age. Urinary bladder is  collapsed.        Impression     IMPRESSION:   1. Calcification in the upper right hemipelvis is in the correct  location to be within the ureter (image 56, series 2), however, there  is no hydronephrosis and the ureter is difficult to discretely  identify at this level. This calcification might also be a phlebolith.    2. Normal appendix. No other specific finding in the abdomen/pelvis to  explain pain.    AMADOR CASTLE MD                Clinical Quality Measure: Blood Pressure Screening     Your blood pressure was checked while you were in the emergency department today. The last reading we obtained was  BP: 104/59 . Please read the guidelines below about what these numbers mean and what you should do about them.  If your systolic blood pressure (the top number) is less than 120 and your diastolic blood pressure (the bottom number) is less than 80, then your blood pressure is normal. There is nothing more that you need to do about it.  If your systolic blood pressure (the top number) is 120-139 or your diastolic blood pressure (the bottom number) is 80-89, your blood pressure may be higher than it should be. You should have your blood  "pressure rechecked within a year by a primary care provider.  If your systolic blood pressure (the top number) is 140 or greater or your diastolic blood pressure (the bottom number) is 90 or greater, you may have high blood pressure. High blood pressure is treatable, but if left untreated over time it can put you at risk for heart attack, stroke, or kidney failure. You should have your blood pressure rechecked by a primary care provider within the next 4 weeks.  If your provider in the emergency department today gave you specific instructions to follow-up with your doctor or provider even sooner than that, you should follow that instruction and not wait for up to 4 weeks for your follow-up visit.        Thank you for choosing [facility]       Thank you for choosing [facility] for your care. Our goal is always to provide you with excellent care. Hearing back from our patients is one way we can continue to improve our services. Please take a few minutes to complete the written survey that you may receive in the mail after you visit with us. Thank you!        [employer] Information     [facility] lets you send messages to your doctor, view your test results, renew your prescriptions, schedule appointments and more. To sign up, go to [email or web address] . Click on \"Log in\" on the left side of the screen, which will take you to the Welcome page. Then click on \"Sign up Now\" on the right side of the page.     You will be asked to enter the access code listed below, as well as some personal information. Please follow the directions to create your username and password.     Your access code is: [email or web address]  Expires: 2018  5:46 AM     Your access code will  in 90 days. If you need help or a new code, please call your [facility] clinic or [phone].        Care EveryWhere ID     This is your Care EveryWhere ID. This could be used by other organizations to access your [facility] medical records  [id number]        Equal " Access to Services     [facility] Hospital[facility] : Jacque Cheney, jermain colon, qanaga luna. So [facility] [phone].    ATENCIÓN: Si habla español, tiene a bishop disposición servicios gratuitos de asistencia lingüística. Llame al [phone].    We comply with applicable federal civil rights laws and [place] laws. We do not discriminate on the basis of race, color, national origin, age, disability, sex, sexual orientation, or gender identity.            After Visit Summary       This is your record. Keep this with you and show to your community pharmacist(s) and doctor(s) at your next visit.

## 2019-01-16 ENCOUNTER — COMMUNICATION - HEALTHEAST (OUTPATIENT)
Dept: ADMINISTRATIVE | Facility: CLINIC | Age: 29
End: 2019-01-16

## 2019-02-25 ENCOUNTER — COMMUNICATION - HEALTHEAST (OUTPATIENT)
Dept: ADMINISTRATIVE | Facility: CLINIC | Age: 29
End: 2019-02-25

## 2019-08-12 ENCOUNTER — COMMUNICATION - HEALTHEAST (OUTPATIENT)
Dept: SCHEDULING | Facility: CLINIC | Age: 29
End: 2019-08-12

## 2019-08-12 ENCOUNTER — COMMUNICATION - HEALTHEAST (OUTPATIENT)
Dept: OBGYN | Facility: CLINIC | Age: 29
End: 2019-08-12

## 2019-08-13 ENCOUNTER — OFFICE VISIT - HEALTHEAST (OUTPATIENT)
Dept: MIDWIFE SERVICES | Facility: CLINIC | Age: 29
End: 2019-08-13

## 2019-08-13 ENCOUNTER — COMMUNICATION - HEALTHEAST (OUTPATIENT)
Dept: MIDWIFE SERVICES | Facility: CLINIC | Age: 29
End: 2019-08-13

## 2019-08-13 DIAGNOSIS — O20.9 HEMORRHAGE IN EARLY PREGNANCY, ANTEPARTUM: ICD-10-CM

## 2019-08-13 ASSESSMENT — MIFFLIN-ST. JEOR: SCORE: 1269.05

## 2019-08-14 ENCOUNTER — RECORDS - HEALTHEAST (OUTPATIENT)
Dept: FAMILY MEDICINE | Facility: CLINIC | Age: 29
End: 2019-08-14

## 2019-08-14 ENCOUNTER — HOSPITAL ENCOUNTER (OUTPATIENT)
Dept: ULTRASOUND IMAGING | Facility: CLINIC | Age: 29
Discharge: HOME | End: 2019-08-14
Attending: ADVANCED PRACTICE MIDWIFE

## 2019-08-14 ENCOUNTER — OFFICE VISIT - HEALTHEAST (OUTPATIENT)
Dept: MIDWIFE SERVICES | Facility: CLINIC | Age: 29
End: 2019-08-14

## 2019-08-14 ENCOUNTER — COMMUNICATION - HEALTHEAST (OUTPATIENT)
Dept: MIDWIFE SERVICES | Facility: CLINIC | Age: 29
End: 2019-08-14

## 2019-08-14 ENCOUNTER — COMMUNICATION - HEALTHEAST (OUTPATIENT)
Dept: ADMINISTRATIVE | Facility: CLINIC | Age: 29
End: 2019-08-14

## 2019-08-14 DIAGNOSIS — R10.9 ABDOMINAL CRAMPING: ICD-10-CM

## 2019-08-14 DIAGNOSIS — Z32.00 POSSIBLE PREGNANCY, NOT CONFIRMED: ICD-10-CM

## 2019-08-14 DIAGNOSIS — O20.9 BLEEDING IN EARLY PREGNANCY: ICD-10-CM

## 2019-08-14 LAB — HCG SERPL-ACNC: 3180 MLU/ML (ref 0–4)

## 2019-08-14 ASSESSMENT — MIFFLIN-ST. JEOR: SCORE: 1269.05

## 2019-08-15 ENCOUNTER — COMMUNICATION - HEALTHEAST (OUTPATIENT)
Dept: ADMINISTRATIVE | Facility: CLINIC | Age: 29
End: 2019-08-15

## 2019-08-16 ENCOUNTER — AMBULATORY - HEALTHEAST (OUTPATIENT)
Dept: LAB | Facility: CLINIC | Age: 29
End: 2019-08-16

## 2019-08-16 ENCOUNTER — COMMUNICATION - HEALTHEAST (OUTPATIENT)
Dept: SCHEDULING | Facility: CLINIC | Age: 29
End: 2019-08-16

## 2019-08-16 DIAGNOSIS — O20.9 HEMORRHAGE IN EARLY PREGNANCY, ANTEPARTUM: ICD-10-CM

## 2019-08-16 LAB — HCG SERPL-ACNC: 4934 MLU/ML (ref 0–4)

## 2019-08-17 ENCOUNTER — COMMUNICATION - HEALTHEAST (OUTPATIENT)
Dept: SCHEDULING | Facility: CLINIC | Age: 29
End: 2019-08-17

## 2019-08-17 ENCOUNTER — COMMUNICATION - HEALTHEAST (OUTPATIENT)
Dept: OBGYN | Facility: CLINIC | Age: 29
End: 2019-08-17

## 2019-08-19 ENCOUNTER — OFFICE VISIT - HEALTHEAST (OUTPATIENT)
Dept: MIDWIFE SERVICES | Facility: CLINIC | Age: 29
End: 2019-08-19

## 2019-08-19 ENCOUNTER — AMBULATORY - HEALTHEAST (OUTPATIENT)
Dept: MIDWIFE SERVICES | Facility: CLINIC | Age: 29
End: 2019-08-19

## 2019-08-19 DIAGNOSIS — O20.9 BLEEDING IN EARLY PREGNANCY: ICD-10-CM

## 2019-08-19 DIAGNOSIS — O20.0 THREATENED MISCARRIAGE IN EARLY PREGNANCY: ICD-10-CM

## 2019-08-19 LAB — HCG SERPL-ACNC: 1174 MLU/ML (ref 0–4)

## 2019-08-19 ASSESSMENT — MIFFLIN-ST. JEOR: SCORE: 1278.13

## 2019-08-26 ENCOUNTER — AMBULATORY - HEALTHEAST (OUTPATIENT)
Dept: MIDWIFE SERVICES | Facility: CLINIC | Age: 29
End: 2019-08-26

## 2019-08-26 ENCOUNTER — OFFICE VISIT - HEALTHEAST (OUTPATIENT)
Dept: MIDWIFE SERVICES | Facility: CLINIC | Age: 29
End: 2019-08-26

## 2019-08-26 DIAGNOSIS — F41.1 GENERALIZED ANXIETY DISORDER: ICD-10-CM

## 2019-08-26 DIAGNOSIS — Z72.0 TOBACCO USE: ICD-10-CM

## 2019-08-26 DIAGNOSIS — Z72.0 TOBACCO ABUSE: ICD-10-CM

## 2019-08-26 DIAGNOSIS — O02.1 MISSED AB: ICD-10-CM

## 2019-08-26 DIAGNOSIS — O03.9 SAB (SPONTANEOUS ABORTION): ICD-10-CM

## 2019-08-26 LAB — HCG SERPL-ACNC: 40 MLU/ML (ref 0–4)

## 2019-08-26 ASSESSMENT — MIFFLIN-ST. JEOR: SCORE: 1309.88

## 2019-09-05 ENCOUNTER — AMBULATORY - HEALTHEAST (OUTPATIENT)
Dept: LAB | Facility: CLINIC | Age: 29
End: 2019-09-05

## 2019-09-05 DIAGNOSIS — O02.1 MISSED AB: ICD-10-CM

## 2019-09-05 LAB — HCG SERPL-ACNC: <2 MLU/ML (ref 0–4)

## 2019-10-28 ENCOUNTER — RECORDS - HEALTHEAST (OUTPATIENT)
Dept: SCHEDULING | Facility: CLINIC | Age: 29
End: 2019-10-28

## 2019-10-28 ENCOUNTER — COMMUNICATION - HEALTHEAST (OUTPATIENT)
Dept: ADMINISTRATIVE | Facility: CLINIC | Age: 29
End: 2019-10-28

## 2020-10-21 ENCOUNTER — OFFICE VISIT - HEALTHEAST (OUTPATIENT)
Dept: MIDWIFE SERVICES | Facility: CLINIC | Age: 30
End: 2020-10-21

## 2020-10-21 DIAGNOSIS — N92.0 MENORRHAGIA WITH REGULAR CYCLE: ICD-10-CM

## 2020-10-21 DIAGNOSIS — Z11.3 ROUTINE SCREENING FOR STI (SEXUALLY TRANSMITTED INFECTION): ICD-10-CM

## 2020-10-21 DIAGNOSIS — D64.9 ANEMIA, UNSPECIFIED TYPE: ICD-10-CM

## 2020-10-21 LAB
CLUE CELLS: NORMAL
ERYTHROCYTE [DISTWIDTH] IN BLOOD BY AUTOMATED COUNT: 15.7 % (ref 11–14.5)
HCT VFR BLD AUTO: 38.2 % (ref 35–47)
HGB BLD-MCNC: 12.2 G/DL (ref 12–16)
HIV 1+2 AB+HIV1 P24 AG SERPL QL IA: NEGATIVE
MCH RBC QN AUTO: 27.9 PG (ref 27–34)
MCHC RBC AUTO-ENTMCNC: 31.9 G/DL (ref 32–36)
MCV RBC AUTO: 87 FL (ref 80–100)
PLATELET # BLD AUTO: 320 THOU/UL (ref 140–440)
PMV BLD AUTO: 11.4 FL (ref 8.5–12.5)
RBC # BLD AUTO: 4.37 MILL/UL (ref 3.8–5.4)
TRICHOMONAS, WET PREP: NORMAL
WBC: 6.7 THOU/UL (ref 4–11)
YEAST, WET PREP: NORMAL

## 2020-10-21 ASSESSMENT — MIFFLIN-ST. JEOR: SCORE: 1319.52

## 2020-10-22 LAB
HBV SURFACE AG SERPL QL IA: NEGATIVE
HCV AB SERPL QL IA: NEGATIVE
T PALLIDUM AB SER QL: NEGATIVE

## 2020-10-23 ENCOUNTER — COMMUNICATION - HEALTHEAST (OUTPATIENT)
Dept: ADMINISTRATIVE | Facility: CLINIC | Age: 30
End: 2020-10-23

## 2020-10-23 ENCOUNTER — AMBULATORY - HEALTHEAST (OUTPATIENT)
Dept: LAB | Facility: HOSPITAL | Age: 30
End: 2020-10-23

## 2020-10-23 DIAGNOSIS — N92.6 MISSED MENSES: ICD-10-CM

## 2020-10-23 LAB
C TRACH DNA SPEC QL PROBE+SIG AMP: NEGATIVE
HCG SERPL-ACNC: <2 MLU/ML (ref 0–4)
N GONORRHOEA DNA SPEC QL NAA+PROBE: NEGATIVE

## 2020-10-27 ENCOUNTER — AMBULATORY - HEALTHEAST (OUTPATIENT)
Dept: MIDWIFE SERVICES | Facility: CLINIC | Age: 30
End: 2020-10-27

## 2021-01-28 ENCOUNTER — COMMUNICATION - HEALTHEAST (OUTPATIENT)
Dept: ADMINISTRATIVE | Facility: CLINIC | Age: 31
End: 2021-01-28

## 2021-05-30 VITALS — WEIGHT: 151 LBS | HEIGHT: 65 IN | BODY MASS INDEX: 25.16 KG/M2

## 2021-05-30 VITALS — WEIGHT: 154 LBS | HEIGHT: 65 IN | BODY MASS INDEX: 25.66 KG/M2

## 2021-05-30 VITALS — HEIGHT: 65 IN | WEIGHT: 151 LBS | BODY MASS INDEX: 25.16 KG/M2

## 2021-05-30 VITALS — WEIGHT: 143 LBS | HEIGHT: 65 IN | BODY MASS INDEX: 23.82 KG/M2

## 2021-05-31 VITALS — HEIGHT: 66 IN | WEIGHT: 161 LBS | BODY MASS INDEX: 25.88 KG/M2

## 2021-05-31 VITALS — HEIGHT: 66 IN | BODY MASS INDEX: 26.36 KG/M2 | WEIGHT: 164 LBS

## 2021-05-31 VITALS — WEIGHT: 164 LBS | HEIGHT: 66 IN | BODY MASS INDEX: 26.36 KG/M2

## 2021-05-31 VITALS — WEIGHT: 167 LBS | BODY MASS INDEX: 26.84 KG/M2 | HEIGHT: 66 IN

## 2021-05-31 VITALS — HEIGHT: 65 IN | WEIGHT: 161 LBS | BODY MASS INDEX: 26.82 KG/M2

## 2021-05-31 NOTE — TELEPHONE ENCOUNTER
"A: 1.  28-year-old  5 para 3-0-1-3 with presumed IUP at about 4 weeks  2.  Vaginal spotting  3.  Rh NEGATIVE  4.  Depression  5.  Generalized anxiety disorder    P: Emergency room precautions reviewed with patient.  Without significant lower abdominal pain, bright red vaginal bleeding, passage of clots or tissue, this writer offers expectant management this evening.  Recommend evaluation during a \"same-day appointment\" tomorrow: Physical exam, serum lab work (quantitative beta-hCG, CBC, ABO/Rh), UPT, vaginal cultures as indicated, UA with UC if indicated, evaluation for Rhophylac in the setting of Rh- maternal status.  May continue Cymbalta as prescribed.  This writer supports discontinuation of Adderall, Xanax and NOT initiating amitriptyline during pregnancy.    S: Kary is calling with questions regarding pink vaginal discharge this evening, and she is about 4 weeks pregnant.  After voiding her bladder, noticed light pink discharge on the toilet paper.  She states that her last period ENDED on , and it usually lasts about 5 days (predicting first day of last normal LMP about 2019).  Patient states she \"lifted a heavy television yesterday\", wrestled around, and had intercourse all in the last 24 hours.  Has felt some \"growing pains\" but denies significant lower abdominal pain, bright red vaginal bleeding, passage of clots or tissue, fever, chills.  States she discontinued \"all of my medication\" learning of pregnancy and wonders if she should continue Cymbalta 60 mg.  \"My psychiatrist told me to cut it in half [the dose] because I am pregnant.\"  Patient states that she discontinued her Xanax and Adderall.  Wonders if she should initiate migraine prevention medication called amitriptyline, a prescription she has not yet started.  Asking if she can take Tylenol for pain.    O: Alert and in no apparent distress although tearful on the phone when describing her fear of miscarriage.  "
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
## 2021-05-31 NOTE — TELEPHONE ENCOUNTER
Has been bleeding since Monday, light pink when wiping and then transitioned to bright red every day since then.  When peeing in toilet, brown color in.  She is VERY anxious about beta hcg levels not rising appropriately. She is feeling some cramping and is using ibuprofen.  She is teary and upset during conversation.  Talked throught with Kary early pregnancy including monitoring and viability, as well as signs of SAB.  Also emphasized importance of decreasing stress and anxiety during this time of unknown.  She has stopped her fluoxitine which she was on for anxiety.  Encouraged her to schedule appointment with this writer on Monday to have repeat beta draw, vaginal exam with spec, discussion of medications she needs to restart (including anxiety meds) and plan of care for repeat US at 6 weeks.  She will call on Monday AM to schedule appt at [facility] clinic on Monday.    KWAKU Parsons,MAHAMED

## 2021-05-31 NOTE — TELEPHONE ENCOUNTER
Called and spoke with patient.  Reviewed ultrasound results and HCG results.  hCG values are reassuring.  Patient has been given information from friends and family stating that bleeding is common in pregnancy and could just be her body expecting a period, so it had a bleed.  Reviewed that bleeding in pregnancy is not normal, and gestational sac was consistent with dating from LMP, so this would not have been a menses bleed.  Speculated that bleeding may have been caused by subchorionic hemorrhage, though not confirmed via ultrasound.  At this time, gestational sac appears intact but no embryo at this point, likely due to early gestational age.  Unfortunately, will require watchful waiting for now.  Acknowledged that it is difficult not to have a specific answer at this time, but will need to reassess via ultrasound in about 2 weeks time.  The patient asking about the possibility of bleeding due to anal intercourse this morning.  Discussed that sometimes bleeding will occur after intercourse, but would expect more bleeding with vaginal intercourse, and not to the extent of her bleeding.  She notes that her pain is much improved and that though she is still bleeding, it has lessened.  Has changed from using a pad to using a padding a liner to be able to better quantify continued bleeding.  Advised to call if increased bleeding with clots, severe abdominal pain, or fever occur prior to next appointment.  Recommended changing appointment with Karen Piercemarcia from this coming Monday to the following Monday, 8/26/2019, and have repeat ultrasound done that day to assess for presence of embryo and pregnancy viability.  Order placed for ultrasound.  Advised to avoid intercourse for 2 to 4 days following cessation of bleeding.  No need for bedrest, but could decrease heavy lifting and attempt to manage stress (as she expresses she has a lot of stress and is concerned about this being a factor).  Advised to drinking lots of  fluids, and eating healthy diet as much as able.  She is asking about a revised letter for work adding in that she should not lift greater than a certain weight, so that her boss can make accommodations.  Letter was created and she will find this in my chart.  Invited to call with any other questions or concerns.

## 2021-05-31 NOTE — TELEPHONE ENCOUNTER
Patient calling - asking to speak with on-call provider for nurse midwives.  She says she found out recently that she is pregnant.  She is not sure how far along she is.  She just went to the bathroom a little bit ago and saw something on the toilet paper when wiping.  The color is pinkish/orange.  Patient is sitting on the toilet crying and is worried about a possible miscarriage.      Is scheduled for an ultrasound 8/29/19 at [facility].  And then was going to follow up with Karen Edwards with [facility] wife [address] who has been her midwife with her past pregnancies.    On-call midwife provider paged 7:50pm.  Advised patient to call back if she does not hear from someone in 20 minutes.    Cyndee Rock RN  Triage Nurse Advisor    Reason for Disposition    MILD vaginal bleeding (i.e., less than 1 pad / hour; less than patient's usual menstrual bleeding; not just spotting)    Protocols used: PREGNANCY - VAGINAL BLEEDING LESS THAN 20 WEEKS RIRI-A-JAYLENE
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
## 2021-05-31 NOTE — TELEPHONE ENCOUNTER
Name of caller: Patient  Phone number where you may be reached: [phone]  Reason for call: Pt would like Bren Lee to call her regarding questions she has since she left clinic today. Pt did not care for the on call CNMs she has dealt with the last couple calls.   Best time to call back: any  If we don't reach you, is it okay to leave a detailed message? yes
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
## 2021-05-31 NOTE — TELEPHONE ENCOUNTER
Name of caller: Patient  Phone number where you may be reached: [phone]  Reason for call: Pt is still passing some blood and would like to have an order placed to get hcg levels checked to make sure that they are rising.  Best time to call back: any  If we don't reach you, is it okay to leave a detailed message? yes

## 2021-05-31 NOTE — TELEPHONE ENCOUNTER
Pt called in states she is looking for lab result.  Inform the pt the lab result is not ready yet.  Pt verbalized understand, no other concern at this time.      Tomy Juares RN, [facility] Triage/Med Refill 8/16/2019 6:40 PM

## 2021-05-31 NOTE — TELEPHONE ENCOUNTER
Call from pt       Requesting Midwife on call follow up re HCG levels from Friday       Call to paging service  - they will have MWF call her back        Pt tele# [phone]        Milton Goodman, RN   Triage and Medication Refills

## 2021-06-01 VITALS — WEIGHT: 134.4 LBS | HEIGHT: 66 IN | BODY MASS INDEX: 21.6 KG/M2
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
## 2021-06-02 NOTE — TELEPHONE ENCOUNTER
Wants to talk to [facility].  Transferred.    Helen Vo, RN, [facility] Nurse Triage/Med Refills RN

## 2021-06-03 VITALS — BODY MASS INDEX: 19.29 KG/M2 | WEIGHT: 120 LBS | HEIGHT: 66 IN

## 2021-06-03 VITALS — WEIGHT: 127 LBS | BODY MASS INDEX: 20.41 KG/M2 | HEIGHT: 66 IN

## 2021-06-03 VITALS — HEIGHT: 66 IN | WEIGHT: 118 LBS | BODY MASS INDEX: 18.96 KG/M2

## 2021-06-03 VITALS — HEIGHT: 66 IN | BODY MASS INDEX: 18.96 KG/M2 | WEIGHT: 118 LBS

## 2021-06-05 VITALS
WEIGHT: 130 LBS | DIASTOLIC BLOOD PRESSURE: 70 MMHG | HEART RATE: 72 BPM | SYSTOLIC BLOOD PRESSURE: 108 MMHG | BODY MASS INDEX: 21.66 KG/M2 | HEIGHT: 65 IN
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
## 2021-06-09 NOTE — PROGRESS NOTES
"Kary is here today with girls. Very teary. Feels like her anxiety is worse since she has been living at the shelter.  Is currently rooming with a woman who is \"a trigger for me\".  States this woman and she get into frequent arguments about childcare and sharing of the room at the shelter.  Kary has been approaching staff to try to help her in her regulating her emotions but she feels this is not helpful.  She saw a therapist last week \"that was OK, I liked it\".  She has weekly scheduled appointments with her.  She also is recommending she see a psychiatrist for medication adjustments.  Kary is very concerned that seeing a psychiatrist will cause the courts to remove her children from her care.  She denies SI or HI, but feels worried about reaching out for help if she feels worried because she doesn't want to lose custody of her kids.  She is loving and caring during her appointments with her kids, and praised Kary for continuing to work on her parenting and self care.  Discussed the importance of self control and reactive behavior when she is triggered by other people.  Encouraged her to see psychiatrist to help with meds.  She states she will do this.  She is going to speak to her therapist first to determine if there is a referral she has, but I also gave the patient handouts for 3 different psychiatrists she can see if needed.  Unfortunately she is in [place], and most referrals will require her to travel to [facility].  She is open to doing this.  Encouraged her to work on finding housing.  She was given a voucher for housing, and needs to look for housing in [facility].  Discussed how she should start looking for housing now.  Clinical assessment from mental health provider at [facility] was received today and reports treatment plan including individual therapy with CBE, DBT, psychodynamic, mindfulness, and solution-focused approach to address stressors and continue healthy coping " skills.  Patient commits to weekly therapy and follow through.  Also contracts for safety and agrees to call emergency mental health provider or CNM on call with any SI or HI.  Received tdap and Rhogam today.  Reviewed s/sx PTL, discussed fetal movement expectations.  Encouraged visit in 2 weeks for emotional health check in.
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
## 2021-06-10 NOTE — DISCHARGE SUMMARY
OB Discharge Summary      Date:  2017    Name:  Kary Sebastian  :  1990  MRN:  [id number]      Admission Date:  2017  Delivery Date:  This patient has no babies on file.  Gestational Age at Delivery:  36w2d  Discharge Date:  2017    Principal Diagnosis:    Patient Active Problem List   Diagnosis     Asthma     Generalized anxiety disorder     Depression     Supervision of other normal pregnancy, antepartum     Late prenatal care     Migraine headache     Rubella non-immune status, antepartum     Rh negative state in antepartum period     Tooth pain          Condition at Discharge:  Stable    Discharge Medications:    Kary Sebastian   Home Medication Instructions SAADIA:[id number]    Printed on:17 0405   Medication Information                      albuterol (PROVENTIL HFA;VENTOLIN HFA) 90 mcg/actuation inhaler  Inhale 2 puffs every 6 (six) hours as needed for wheezing.             butalbital-acetaminophen-caffeine (FIORICET, ESGIC) -40 mg per tablet  Take 1 tablet by mouth every 6 (six) hours as needed for pain.             doxylamine (UNISON) 25 mg tablet  Take 25 mg by mouth.             famotidine (PEPCID) 20 MG tablet  Take 1 tablet (20 mg total) by mouth daily.             ferrous sulfate 325 (65 FE) MG tablet  Take 1 tablet (325 mg total) by mouth daily with breakfast.             magnesium 200 mg Tab  Take 200 mg by mouth bedtime.             PNV CMB#21/IRON/FOLIC ACID (PRENATAL COMPLETE ORAL)  Take by mouth.             pyridoxine, vitamin B6, (B-6) 25 MG tablet  Take 25 mg by mouth.             sertraline (ZOLOFT) 50 MG tablet  Take 1 tablet (50 mg total) by mouth daily.                 Discharge Plan:    Follow up with /CNM: next week         Physician/CNM:  Jayshree Subramanian    Name:  Kary Sebastian  :  1990  MRN:  [id number]      Focus on hydration and rest! Make sure you are getting at least 64 oz of water a day, avoid caffeinated and sugary beverages.   Your  ultrasound showed mild hydronephrosis of your left ureter. The kidney measured normal.   As we discussed, the hydronephrosis, or dilation, is seen in up to 80% of pregnant women and is not concerning!     Things to call us about:  Contractions lasting 60 seconds every 5 minutes for 1 hour, these contractions are difficult to work through  Vaginal bleeding.   If your water breaks.   If there is a change in baby's movements  Fever/chills  Severe back pain      AM I IN LABOR?    What is labor?  Labor is the work that your body does to birth your baby. Your uterus (the womb) contracts. Your cervix (the mouth of the uterus) opens. You will push your baby out into the world.  What do contractions (labor pains) feel like?  When they first start, contractions usually feel like cramps during your period. Sometimes you feel pain in your back. Most often, contractions feel like muscles pulling painfully in your lower belly. At first, the contractions will probably be 15 to 20 minutes apart. They will not feel too painful. As labor goes on, the contractions get stronger, closer together, and more painful.  How do I time the contractions?  Time your contractions by counting the number of minutes from the start of one contraction to the start of the next contraction.  What should I do when the contractions start?  If it is night and you can sleep, sleep. If it happens during the day, here are some things you can do to take care of yourself at home:    Walk. If the pains you are having are real labor, walking will make the contractions come faster and harder. If the contractions are not going to continue and be real labor, walking will make the contractions slow down.     Take a shower or bath. This will help you relax.     Eat. Labor is a big event. It takes a lot of energy.     Drink water. Not drinking enough water can cause false labor (contractions that hurt but do not open your cervix). If this is true labor, drinking water  will help you have strength to get through your labor.     Take a nap. Get all the rest you can.     Get a massage. If your labor is in your back, a strong massage on your lower back may feel very good. Getting a foot massage is always good.     Don't panic. You can do this. Your body was made for this. You are strong!  When should I go to the hospital or call my health care provider?   Your contractions have been 5 minutes apart or less for at least 1 hour.     If several contractions are so painful you cannot walk or talk during one.     Your bag of marquez breaks. (You may have a big gush of water or just water that runs down your legs when you walk.)  Are there other reasons to call my health care provider?  Yes, you should call your health care provider or go to the hospital if you start to bleed like you are having a period--blood that soaks your underwear or runs down your legs, if you have sudden severe pain, if your baby has not moved for several hours, or if you are leaking green fluid. The rule is as follows: If you are very concerned about something, call.      (<37 WEEKS GESTATION) LABOR? WHAT DO I DO NOW?    If your baby is due more than 3 weeks from today and you are having back pain or stomach cramps, or there is fluid leaking from your vagina, or your baby has not moved for several hours, or you have other troubling symptoms, call your health care provider now!

## 2021-06-10 NOTE — PROGRESS NOTES
"Feeling well, here with kids.  Car broke down and is having trouble with transportation, getting rides from her friends.  Discussed calling MA for assistance with rides.  Sees psychiatrist tomorrow, EPDS 18. Denies SI or HI, struggles with anxiety and feels depression is well managed now that her living situation is stable. Emphasized importance of meeting with psychiatrist tomorrow to discuss current emotional state, as well as create plan for postpartum care and medication management.  Feeling a lot contractions, no pattern or pain.  Reports normal and regular FM with baby boy.  GBS and hgb today. Breast pump given today.  Long discussion regarding weekly appointments.  aKry is aware that she can and should see other CNM's for her pregnancy care, and she is aware that this writer will likely not be attending her birth, unless this writer is on call.  She states she is aware of this, but is worried about meeting new people.  Reassured Kary that entire midwife team is a supportive, encouraging team.  She desires IOL at 39 weeks.  Discussed need for favorable lopez score for this to happen.  Also discussed natural labor \"encouragement\" including IC.  She is currently sexually active with Maritza, who Kary states is monogamous.  Discussed safe sex practices.  GBS testing performed today, as well as hgb.  Kary notes dizziness when bending over or sitting to standing, discussed physiology behind symptoms. She also has not started her oral iron, rx refaxed to pharmacy in [place] per pt preference.  Labor s/sx reviewed, when to call CNM on call discussed as well as handout given for early labor management.  GBS and hgb today.  Weekly appts strongly encouraged.  "

## 2021-06-10 NOTE — PROGRESS NOTES
"Having a lot of contractions.  Having low back pain still, but gets relief from position changes.  Dysuria and urgency has resolved.  Has had some relief in contractions. Baby moving normally. Wants to have membrane stripping, discussed r/b/a and difficulty to sweep membranes due to posterior position of cervix.  Very difficult to reach internal os.  Patient states she wants to drink castor oil to induce labor.  Strongly advised pt to not drink castor oil, discussed risks with dehydration, delivery prior to 38 weeks.  Patient feels she wants IOL by 39 weeks.  Advised patient will continue to discuss at each appointment.  Kary states she is \"very nervous and worried about going into labor or her water breaking, and I'm worried I'll miss my opportunity for an epidural\".  Reassured Kary that she needs to try and practice patience and reassured her with the health of her pregnancy and baby.  Reviewed s/sx labor, as well as negative GBS status.  Reviewed labs from the hospital.  Kary states she has started drinking soda and eating chips again, but states \"I am really worried about my weight gain\".  Advised Kary that she needs to reflect her worry about weight gain into better choices with food and drink.  Discussed how soda and chips can contribute to extra weight gain, as well as affect overall nutrition.  Additionally discussed concerns of excessive weight gain in labor.  Reassured Kary that she can make healthy choices with her diet, as she has done this in the past.  Encouraged her to continue thoughtful eating. Relationship with Maritza is improving, she has withdrawn the order of protection she had initiated.  She feels safe in her relationship and feels she is in a good place with him.  She is currently sexually active with Maritza, and declines STI checking today.  She states they are in monogamous relationship.  "

## 2021-06-10 NOTE — PROGRESS NOTES
"Has a tooth infection, has pain over the front right tooth.  Is taking amoxicillin for infection, was given Percocet for pain (3 tablets).  Discussed need for new dental appointment for infection treatment.  DIscussed that pain should have resolved with onset of abx, and if she is feeling worsening pain with swelling, she needs to see dentist ASAP and has infection addressed.  Patient reports feeling more pelvic pressure and pain, feeling some BH UC's.  She desires cervical exam \"just in case something is happening down there\".  Is not taking Fe pills, states rx never got to the pharmacy.  Rx filled for Ferrous sulfate, as well as her migraine medication (fioricet).  She also is requesting a refill of her zoloft.  Discussed management of her mental health issues, she was scheduled to see her psychologist today, however she cancelled the appointment to come to her PNV today.  She is also having transportation issues.  Reviewed need for psych consult, and she states she will call clinic to reschedule her visit. Reports having left handed pain with numbness and tingling in fingers.  Suspect pregnancy related carpel tunnel symptoms.  Brace fitted in office and given to patient to wear. She recently moved into a new apartment, and she is settling in with her children there.  Cervix was long, thick, closed and posterior.  Reviewed s/sx PTL.  Reviewed schedule of PNV.    "

## 2021-06-10 NOTE — PROGRESS NOTES
"Outpatient/Triage Note:    Patient Name:  Kary Sebastian  :      1990  MRN:      [id number]      Assessment:   @ 37w6d here for evaluation of irregular contractions.     Plan:   - Discharge to home undelivered.   -Reviewed warning signs including decreased fetal movement, leaking of fluid, vaginal bleeding, or signs of labor.   -Reviewed how to contact on-call CNM.   -Follow-up in clinic with CNM as scheduled or sooner as needed.   -All questions answered. Agrees with plan.     Subjective:  Kary Sebastian has been resting in bed and on the birthing ball.  Denies any increase in contraction strength or intensity.  Continues to deny leaking of fluid, bleeding, or changes in fetal movement.  Concerned that she will miss her opportunity for an epidural in labor if she does not present to hospital \"in early labor.\"    Objective:  Vital signs: /78 (Patient Position: Semi-musa, Cuff Size: Adult Regular)  Pulse 89  Temp 98.7  F (37.1  C) (Oral)   Resp 16  Ht 5' 5.5\" (1.664 m)  Wt 164 lb (74.4 kg)  LMP 2016  BMI 26.88 kg/m2  FHR: Baseline 130, moderate variability, accelerations present, no decelerations  Uterine contractions: every 3-5 minutes, palpate mild to moderate      SVE: unchanged from previous exam      Temp:  [98.7  F (37.1  C)] 98.7  F (37.1  C)  Heart Rate:  [89] 89  Resp:  [16] 16  BP: (116)/(78) 116/78  No intake or output data in the 24 hours ending 17 8018      Provider:KWAKU Baker,MAHAMED    TT with patient 20mn >50% time spent counseling.          "

## 2021-06-10 NOTE — PROGRESS NOTES
"Outpatient/Triage Note:    Patient Name:  Kary Sebastian  :      1990  MRN:      [id number]      Assessment:   @ 37w6d here for evaluation of contractions.     Plan:   -Encourage oral hydration and calorie intake  -Tylenol now for headache  -Intermittent monitoring after reactive FHR tracing  -Re-evaluate cervix in 2 hours or sooner with change in status    Subjective:  Kary Sebastian is a 26 y.o.  at 37+6 weeks, with an EDC of 6-12-17 who presented to [facility] for evaluation of labor. States her contraction began at 1800 tonight but increased in intensity at 2000.  States she has had to stop and concentrate on \"some\" contractions and lost her mucus plug this morning.  Denies leaking of fluid, bleeding, or changes in fetal movement. States she had an argument with her boyfriend earlier this evening and he was unable to drive her to the hospital because \"he had been drinking.\"  States she has a mild headache but denies any visual changes or epigastric pain.      Objective:  Vital signs: /78 (Patient Position: Semi-musa, Cuff Size: Adult Regular)  Pulse 89  Temp 98.7  F (37.1  C) (Oral)   Resp 16  Ht 5' 5.5\" (1.664 m)  Wt 164 lb (74.4 kg)  LMP 2016  BMI 26.88 kg/m2  FHR: Baseline 130, moderate variability, accelerations present, no decelerations.   Uterine contractions: Every 2-4 minutes, palpate mild to moderate    Physical Exam:   Abdomen: SIUP, vertex by Leopold's, abdomen non-tender  SVE: 2cm/50%/-3/posterior, off to patient's left, suture lines palpated on exam      Temp:  [98.7  F (37.1  C)] 98.7  F (37.1  C)  Heart Rate:  [89] 89  Resp:  [16] 16  BP: (116)/(78) 116/78  No intake or output data in the 24 hours ending 17 5177      Provider:KWAKU Baker,CNM    TT with patient 20mn >50% time spent counseling.          "

## 2021-06-10 NOTE — PROGRESS NOTES
Outpatient/Triage Note:    Patient Name:  Kary Sebastian  :      1990  MRN:      [id number]      Assessment:   @ 36w2d here for evaluation of cramping, nausea and back pain.   Maternal tachycardia.   Reactive NST.   Not in labor.   Back pain NOS.   Hydronephrosis.     Plan:   1. Offered PO hydration, patient requesting IV fluids, started with 500 bolus IV.   2. Wet prep negative. Offered GC/CT, patient declines stating in sexually monogamous relationship and no concern for STIs at this time.   3.  Reviewed UA with patient, patient states she did not provide clean catch; will await culture to give treatment as appropriate. Reviewed negative wet-prep.  4.  Pepcid and Zofran ordered to be given while at [facility], following administration patient feeling much better, nausea has improved and sensation of swelling in stomach has decreased.   5.  This writer has a low suspicion for kidney stone however based on rapid onset, patient discomfort and statement regarding hx of kidney stones, recommended kidney US. Reviewed result with patient of mild hydronephorosis. We discussed prevalence and normalcy. Questions answered. Recommend at this time d/c to home with rest and increasing PO hydration. Encouraged tylenol for pain/discomfort. Reviewed warning signs of fever, chills, change to urinary habits, back pain, cramping, vaginal bleeding, labor, or change to fetal movement, all indications to call CNM. RX also provided for Pepcid per patient request. Encouraged patient to monitor diet and avoid gluten and other allergens.   6.  Reviewed importance of taking SSRI daily and that sudden cessation can cause withdrawal. Reviewed why for mental health having steady state of medication in system is beneficial.   7. Numerous questions answered regarding Simon Score and elective induction of labor, discussed with patient why we don't offer until 39 weeks. Patient reports takes castor oil to induce, reviewed lack of evidence  "to support safety and recommend against but if patient chooses to use, PO hydration is extremely important. Answered questions regarding contractions/early labor, and SROM, when to call CNM on call. Emotional support provided regarding frustration of not seeing primary midwife for next two visits, but re-iterated importance of seeing more than one CNM and unsure who will be on call when patient is in labor.   8. Follow up in clinic as scheduled or sooner PRN, all questions answered and agrees with POC.     Subjective:  Kary Sebastian is a 26 y.o.  at 36 2/7 weeks, with an EDC of 2017 who presented to [facility] for evaluation.  When patient spoke to this writer in the phone she had a c/o of cramping/contractions for the past 1-2 hours, able to talk through them, but achy and reports similar to menstrual pain. States she did not notice any vaginal bleeding, no leaking of fluid and no change to discharge, and endorses normal fetal movement. Also discussed noticing some nausea for the past few hours. Has not been drinking fluids much today.   Upon arrival to the unit she also states loose stools for past 1-2 hours (beginning before she called on-call CNM). Reports her children are not sick. Ate pizza and soda for dinner. Also reporting possibly some epigastric discomfort and noted last night prior to bed as well, has not taken anything for heart-burn. Requesting IV Zofran for nausea.  Now that she is at [facility] Kary reports feeling some back pain that is lower in her back but also on left flank. \"Could I have a kidney stone?\", reports she has had kidney stones in previous pregnancies.  Pain is described as constant, not worse with movement, and has no radiating qualities. Reports it is \"achey\" and reports is 4-5/10.   Also reports has been moving some things around her apartment in past few days.   Patient states pain is mostly related to fetal movement and also a sensation of \"fullness\" or \"swelling\" on upper " "abdomen.   Patient when talking with RN reports is not taking medications and hasn't taken Zoloft in at least a few days. Denies any SI/HI and saw her psychiatrist yesterday.  Following Zofran administration feeling better and dozing.   After ultrasound patient states \"I think maybe my stomach hurt because I ate gluten\".     Objective:  Vital signs: /79 (Patient Position: Semi-musa, Cuff Size: Adult Regular)  Pulse (!) 106  Temp 98.5  F (36.9  C) (Oral)   Ht 5' 5.5\" (1.664 m)  Wt 161 lb (73 kg)  LMP 09/05/2016  BMI 26.38 kg/m2  FHR: 120 BPM with moderate variability, one 15x15 acceleration noted, one variable deceleratione noted to 90s BPM with position change.   Uterine contractions: None noted and non-palpated.    Physical Exam:   Abdomen: SIUP, vertex by Leopold's, abdomen non-tender  SVE: 1/50/posterior/soft/-1: not rechecked as no uterine activity.   Negative CVA tenderness, pain non-reproducible.   No swelling noted. 1+ DTRs.       Results:    Lab:   Results for orders placed or performed during the hospital encounter of 05/17/17   Collect and send wet prep vaginal specimen as indicated by prenatal history and/or patient complaints   Result Value Ref Range    Yeast Result No yeast seen No yeast seen    Trichomonas No Trichomonas seen No Trichomonas seen    Clue Cells, Wet Prep No Clue cells seen No Clue cells seen   Urinalysis-UC if Indicated   Result Value Ref Range    Color, UA Yellow Colorless, Yellow, Straw, Light Yellow    Clarity, UA Cloudy (!) Clear    Glucose,  mg/dL (!) Negative    Bilirubin, UA Negative Negative    Ketones, UA Negative Negative    Specific Gravity, UA 1.016 1.001 - 1.030    Blood, UA Negative Negative    pH, UA 7.0 4.5 - 8.0    Protein, UA Trace (!) Negative mg/dL    Urobilinogen, UA <2.0 E.U./dL <2.0 E.U./dL, 2.0 E.U./dL    Nitrite, UA Negative Negative    Leukocytes, UA Small (!) Negative    Bacteria, UA Few (!) None Seen hpf    RBC, UA 0-2 None Seen, 0-2 hpf    " WBC, UA 0-5 None Seen, 0-5 hpf    Squam Epithel, UA >100 (!) None Seen, 0-5 lpf    Mucus, UA Few (!) None Seen lpf        US of left kidney shows mild hydronephrosis.     Provider:  KWAKU Bee, [facility]  TT with patient  60 mn >50% time spent counseling, education and care coordination.

## 2021-06-11 NOTE — PROGRESS NOTES
ROM Plus results are negative.  Discharge instructions reviewed with patient.  Patient states understanding.  Cab company called to pick patient up.  Pt discharged ambulatory to emergency room for ride.

## 2021-06-11 NOTE — ANESTHESIA PROCEDURE NOTES
Epidural Block    Patient location during procedure: OB  Time Called: 6/6/2017 2:00 PM  Reason for Block:labor epidural  Staffing:  Performing  Anesthesiologist: DORIS ROMANO  Preanesthetic Checklist  Completed: patient identified, risks, benefits, and alternatives discussed, timeout performed, consent obtained, at patient's request, airway assessed, oxygen available, suction available, emergency drugs available and hand hygiene performed  Procedure  Patient position: sitting  Prep: ChloraPrep  Patient monitoring: continuous pulse oximetry, heart rate and blood pressure  Approach: midline  Location: L4-L5  Injection technique: GILES saline  Number of Attempts:2 (First attempt, patient unable to position correctly secondary to anxiety.  Sat up, did some education and resumed.  Second attempt, first pass successful.)  Needle  Needle type: Veronica   Needle gauge: 18 G     Catheter in Space: 5  Assessment  Sensory level: T10  No complications

## 2021-06-11 NOTE — PROGRESS NOTES
Outpatient/Triage Note:    Patient Name:  Kary Sebastian  :      1990  MRN:      [id number]      Assessment:   @ 38w4d here for evaluation of possible PROM.   Category I Tracing.   BOW-Intact.   Not In Labor.     Plan:   - Zofran PO given, encouraged PO hydration with water. No longer complaining of nausea at d/c.  -ROM plus sent and negative.  - Many questions answered regarding induction of labor and what would change today if PROM occurred. We discussed options of expectant management vs induction with Pitocin. We discussed if ROM plus negative recommend d/c to home and continue with plan for induction on Monday. Questions answered regarding expected time until birth and reviewed no way to predict. Kary wondering why would have to call prior to induction and we explained safety, staffing and space at hospitals, she verbalized understanding.   -D/C to home undelivered. We discussed s/sx of active labor, recommendations for staying home in early labor for rest and hydration, we discussed signs of SROM and encouraged to call with any questions/concerns.   - We reviewed warning signs of changes to fetal movement, vaginal bleeding.   - Encouraged rest, hydration and staying well-nourished.  - Plans to call [facility] on Monday at 0700 to make sure able to take her.     Subjective:  Kary Sebastian is a 26 y.o.  at 38 4/7 weeks, with an EDC of 2017 who presented to [facility] for evaluation of possible LOF.   Kary spoke to the on-call CNM x 3 earlier today. This AM she reported contractions every 6 minutes noted mostly in low back and feeling nausea. Recommended she drink water, take a bath or shower and rest. She tried to eat and drink and reports contractions spaced and lessened in intensity with rest though nausea has continued. Has had 1 large bottle of water, 1 small glass of water, some juice and a few sips of brii mist. Eating well throughout the day. She called CNM back and states the  contractions have spaced. Around 2 PM she spoke to the on-call CNM again reporting possible leaking of her bag of fluid. She reports x 2 noticing wetness on underpants and seems thinner than discharge. She denies that it increases with position changes or valsalva, and when she did not have underpants on she did not notice fluid running down legs. Upon arrival to hospital states nausea continues and struggling, contractions have diminished, mostly feeling pain in back/tail bone.   Endorses normal fetal movement.     Concerned that she will go into labor and miss getting pain medications. Mother has told patient to stop walking in case this occurs.   Objective:  Vital signs: LMP 09/05/2016  FHR: 125 BPM with MV, accels present, no decels.   Uterine contractions: None noted except with position change. Very minimal irritability.     Physical Exam:   Abdomen: SIUP, vertex by Leopold's, abdomen non-tender  SVE: 2/60/-2/soft/posterior  Sterile speculum exam: moderate amount thick white discharge at cervical os and in posterior vaginal vault. No pooling noted. No increased fluid/discharge with valsalva. 3  Legs: No edema, moves all freely        Results:    Lab:   Results for orders placed or performed during the hospital encounter of 06/02/17   Rupture of Fetal Membrane Screen   Result Value Ref Range    Rupture Fetal Membrane Negative Negative          Provider:  KWAKU Bee, [facility]  TT with patient 25mn >50% time spent counseling, education and care coordination.

## 2021-06-11 NOTE — ANESTHESIA PREPROCEDURE EVALUATION
Anesthesia Evaluation        Airway   Mallampati: II   Pulmonary - normal exam   (+) asthma                           Cardiovascular   Exercise tolerance: > or = 4 METS  Rhythm: regular  Rate: normal,         Neuro/Psych    (+) depression, anxiety/panic attacks,     Comments: Severe anxiety    Endo/Other    (+) pregnant     GI/Hepatic/Renal       Other findings:     Asthma    Generalized anxiety disorder   Depression   Supervision of other normal pregnancy, antepartum   Late prenatal care   Migraine headache   Rubella non-immune status, antepartum   Rh negative state in antepartum period   Back pain affecting pregnancy   Abdominal pain affecting pregnancy   Irregular contractions   Encounter for suspected premature rupture of amniotic membranes, with rupture of membranes not found                Dental - normal exam                        Anesthesia Plan  Planned anesthetic: epidural  Parturient in labor requesting analgesia.    ASA 2     Anesthetic plan and risks discussed with: patient    Post-op plan: epidural analgesia and routine recovery

## 2021-06-11 NOTE — ANESTHESIA POSTPROCEDURE EVALUATION
Patient: Kary Sebsatian  * No procedures listed *  Anesthesia type: epidural    Patient location: Labor and Delivery  Last vitals:   Vitals:    06/07/17 0510   BP: 120/70   Pulse: (!) 52   Resp: 16   Temp: 36.8  C (98.2  F)   SpO2:      ANESTHESIOLOGY POSTEPIDURAL NOTE:     The patient is s/p delivery.    She reports a satisfactory analgesic result from the labor epidural.    There is no evidence of epidural sequelae such as paresthesia, infection, or PDPH.     She has been informed that mild discomfort at the insertion is not unusual and should resolve within a day or two.    She should inform nursing staff if she notes lower extremity motor weakness / paresthesia / nausea / vomiting / fever / chills / severe back pain /  headache.

## 2021-06-11 NOTE — PROGRESS NOTES
Kary presents to the clinic with friend and daughters. Desires elective IOL at 39 weeks for discomforts of pregnancy and sweeping of membranes today. Baby is active, and she denies VB, LOF or regular UCs. Simon score: 6. IOL scheduled 6-5-17 at 0900 at [facility]. Term labor prec and danger s/sx reviewed. All ?s answered. Call/RTC with any ?s/concerns/PRN. Enc daily [facility].

## 2021-06-11 NOTE — PROGRESS NOTES
Pt here to R/O SROM.  Pt denies vaginal bleeding.  EFM on, occasional mild contraction noted.  Pt c/o back pain.  CNM in department

## 2021-06-12 NOTE — PROGRESS NOTES
Records from 10/21/2020 visit (Office visit and lab results) faxed to Samy Jordan MD at [phone] per signed VERONICA from patient.
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
## 2021-06-12 NOTE — TELEPHONE ENCOUNTER
Pt was in to see  on Wednesday and had some slight blood noted while doing a vaginal exam. Pt thought she would be getting her period and had a bit more blood show so put a tampon in and nothing showed on tampon. Patient still has not got her period but continues to have slight bleeding. Pt took a UPT at home and it was negative but she is concerned it could be implantation bleeding. She would like to do a blood draw pregnancy test(lab only) if possible. Please contact pt to discuss and possibly order lab.

## 2021-06-12 NOTE — TELEPHONE ENCOUNTER
Serum quantitative beta-hCG order entered per patient request.  She will go to [facility] lab today.

## 2021-06-15 PROBLEM — Z67.91 BLOOD TYPE, RH NEGATIVE: Status: ACTIVE | Noted: 2017-03-27

## 2021-06-16 PROBLEM — Z72.0 TOBACCO USE: Status: ACTIVE | Noted: 2019-08-26

## 2021-06-19 NOTE — LETTER
Letter by Karen Edwards APRN, CNM at      Author: Karen Edwards APRN, CNM Service: -- Author Type: --    Filed:  Encounter Date: 8/19/2019 Status: (Other)         August 19, 2019     Patient: Kary Sebastian   YOB: 1990   Date of Visit: 8/19/2019       To Whom It May Concern:    It is my medical opinion that Kary Sebastian may return to work on Wednesday, 8/21/19..    If you have any questions or concerns, please don't hesitate to call [phone].    Sincerely,        Electronically signed by KWAKU Parsons CNM

## 2021-06-19 NOTE — LETTER
Letter by Hermelinda Gomes APRN, CNM at      Author: Hermelinda Gomes APRN, CNM Service: -- Author Type: --    Filed:  Encounter Date: 8/13/2019 Status: (Other)         August 13, 2019     Patient: Kary Sebastian   YOB: 1990   Date of Visit: 8/13/2019       To Whom It May Concern:    It is my medical opinion that Kary Sebastian needs to be out of work Monday, August 12,2019 through Thursday, August 15, 2019.  Patient may return to work on August 16 if she is feeling well.    If you have any questions or concerns, please don't hesitate to call.    Sincerely,        Electronically signed by KWAKU Bocanegra CNM

## 2021-06-19 NOTE — PROGRESS NOTES
"Subjective:      Chief Complaint: Irregular Periods    HPI:  Kary Sebastian is a 27 y.o. female is here because she has had some irregular periods in the last month to month and a half.  She states that she heard tracking her periods around May.  She states that her irregular periods started around the beginning of July.  Her menstrual history is as follows:    May 13th-18th June 8-13th July 4th-July 11 July 28th - Aug 1  Aug 5 - spotting with wiping; Today - more period, placed pad, not enough to fill pad or tampon, feels like it's enough to bleed like a period but it's normal period.    The patient does note a history of having a ruptured ovarian cyst but states that that was 6 or 7 years ago.  She has not had any concerns or issues with that since that time.    The patient states that she is not on any birth control.  She states that she has tried a few options in the past but they do not seem to work for her.  She does use condoms.  She states that she could possibly be pregnant.  She states she had unprotected intercourse at a time when she feels she might of been fertile.  She states she took a pregnancy test yesterday twice and both of those tests were negative.    The patient does note a family history of abnormal thyroid, noting that her biological mother had some \"thyroid problem\".  She has never had her thyroid checked.    Obstetric history: The patient gave birth on June 6th 2017, periods restarted 3.5 weeks later got a normal period since then.  The patient lactated and pumped her breast milk for 6 weeks.    Review of Systems  Pertinent items are noted in HPI.  A 12 point comprehensive review of systems was negative except as noted.  Constitutional: negative  Eyes: negative  Ears, nose, mouth, throat, and face: negative  Respiratory: negative  Cardiovascular: negative  Gastrointestinal: negative  Genitourinary:positive for Vaginal bleeding, irregular periods, negative for dysuria, hematuria, hesitancy " "and urinary incontinence  Integument/breast: negative  Hematologic/lymphatic: negative  Musculoskeletal:negative  Neurological: negative  Behavioral/Psych: negative  Endocrine: negative  Allergic/Immunologic: negative     The patient's past medical, surgical and family history are not pertinent to this visit.     Objective:     /64 (Patient Site: Right Arm, Patient Position: Sitting, Cuff Size: Adult Regular)  Pulse 64  Ht 5' 5.5\" (1.664 m)  Wt 134 lb 6.4 oz (61 kg)  LMP 07/04/2018  Breastfeeding? No  BMI 22.03 kg/m2    General Appearance:    Alert, cooperative, no distress, appears stated age   Head:    Normocephalic, without obvious abnormality, atraumatic   Eyes:    PERRL, conjunctiva/corneas clear, EOMs grossly intact       Nose:   Nares normal, septum midline, mucosa normal, no drainage   Throat:   Lips, mucosa, and tongue normal; teeth and gums normal           Cardiovascular:     Respirations unlabored                        Pelvic: small amount of blood coming from cervical os and in vaginal vault. Cervix appears normal, parous, no lesions noted while gather Pap smear. Vaginal vault appears grossly normal. No lesions or areas of bleeding on external genitalia. No abnormal discharge noted.            Skin:   Skin color, texture, turgor normal, no rashes or lesions       Neurologic:  Psych:   Alert & Oriented x4   Normal speech. Good eye contact. Does not appear anxious or depressed           Assessment:        1. Encounter for cervical Pap smear with pelvic exam    2. Family history of thyroid disease in mother    3. Vaginal bleeding    4. Irregular periods    5. Vaginal discharge            Plan:     1.  We discussed possible etiologies of the regular periods including but not limited to, hormonal changes, changes in her stress, variation of normal, pregnancy, changes in her thyroid levels, changes in her endometrial lining, ovarian cyst.  We also discussed that the bleeding could be due to " vaginal irritation due to infection.  We also discussed the bleeding could come from the cervix due to changes in her cervix.  At this time, we agreed to do the following tests: TSH, pregnancy test by quantitative beta-hCG, Pap as she is due in 2017, GC/CT and a wet prep.  We discussed that if this bleeding does continue over the next month or so with similar irregularity, at that time we could pursue more testing such as a pelvic ultrasound.  2.  Patient should call back if she has further concerns, heavy bleeding, or change in her symptoms.  3.  I did discuss that since her blood type is AB- as we have noted in the computer, that if she is pregnant, I would recommend that she comes back for a prophylactic injection.  Patient states that if she is pregnant she would desire to keep the pregnancy.  4.  If the patient is not pregnant, I asked if she would want to go on any type of birth control.  She states she does not know what she wants to use at this time.  She is not really used any hormonal birth control for a while.  She is mostly use condoms.  She thinks that she would want to keep using condoms but is interested in having further discussion on other options.    Total time spent with patient 40 minutes, >50% counseling, education and coordination of care.    KWAKU Fan CNM   8/7/2018 4:24 PM

## 2021-06-19 NOTE — LETTER
Letter by Bren Lee APRN, CNM at      Author: Bren Lee APRN, CNM Service: -- Author Type: --    Filed:  Encounter Date: 8/14/2019 Status: (Other)         August 14, 2019     Patient: Kary Sebastian   YOB: 1990   Date of Visit: 8/14/2019       To Whom It May Concern:    It is my medical opinion that Kary Sebastian should remain out of work through this coming Friday 8/16/19.  When she returns she should avoid lifting anything greater than 10lb for the time being.    If you have any questions or concerns, please don't hesitate to call.    Sincerely,        Electronically signed by Bren Lee CNM, MAHAMED AMES

## 2021-06-21 NOTE — LETTER
Letter by Karen Edwards APRN, CNM at      Author: Karen Edwards APRN, CNM Service: -- Author Type: --    Filed:  Encounter Date: 10/21/2020 Status: (Other)         October 21, 2020     Patient: Kary Sebastian   YOB: 1990   Date of Visit: 10/21/2020       To Whom It May Concern:    It is my medical opinion that Kary Sebastian may return to work on 10/21/20.  She was seen for a medical appointment today..    If you have any questions or concerns, please don't hesitate to call.    Sincerely,        Electronically signed by KWAKU Parsons CNM

## 2021-06-23 NOTE — TELEPHONE ENCOUNTER
Name of caller: Patient  Phone number where you may be reached: [phone]  Reason for call: pt said MH prescribed a medication with acetominophen and caffeine in it  for her migraines a couple yrs ago, and she would like a refill. Her migraines have been really bad, and they affect her vision. She has a psychiatrist and therapist and MD at [facility] she has been seeing, she also was at [facility] in [place] yesterday. Writer explained we cannot see her [facility] records/meds unless she has signed a Care Everywhere form, she wanted a form emailed to her so she could sign it. I said we cannot email to patients. She is on her medications from  and also got a med for something else at the [facility] visit yesterday. [address] [facility] in [place] is her preferred pharmacy. Pls call to discuss if she can get a refill of the medication.  Best time to call back: this aft  If we don't reach you, is it okay to leave a detailed message? no

## 2021-06-23 NOTE — TELEPHONE ENCOUNTER
Routing comment from Fanny AMES CNM  I would recommend she either connect with the providers she saw recently or else make a new appointment with us or family medicine. Patient notified said she has already made an appt elswhere.

## 2021-06-24 NOTE — TELEPHONE ENCOUNTER
Name of caller: Patient  Phone number where you may be reached: [phone]  Reason for call: Pt saw an OB GYN for an ovarian cyst but she is not happy with that provider. She states that she has passed a blood clot and that the pain is getting worse. Pt feels comfortable seeing MH and would like to speak with her specifically to discuss if she would be able to see her in clinic or if she will need a referral to see someone else.  Best time to call back: any  If we don't reach you, is it okay to leave a detailed message? yes

## 2021-07-14 PROBLEM — Z34.90 PREGNANT: Status: RESOLVED | Noted: 2017-06-06 | Resolved: 2017-06-07

## 2021-07-14 PROBLEM — O09.899 RUBELLA NON-IMMUNE STATUS, ANTEPARTUM: Status: RESOLVED | Noted: 2017-02-20 | Resolved: 2019-08-26

## 2021-07-14 PROBLEM — Z34.80 SUPERVISION OF OTHER NORMAL PREGNANCY, ANTEPARTUM: Status: RESOLVED | Noted: 2017-02-14 | Resolved: 2019-08-26

## 2021-07-14 PROBLEM — O47.9 IRREGULAR CONTRACTIONS: Status: RESOLVED | Noted: 2017-05-28 | Resolved: 2019-08-26

## 2021-07-14 PROBLEM — Z28.39 RUBELLA NON-IMMUNE STATUS, ANTEPARTUM: Status: RESOLVED | Noted: 2017-02-20 | Resolved: 2019-08-26

## 2021-07-14 PROBLEM — O09.30 LATE PRENATAL CARE: Status: RESOLVED | Noted: 2017-02-14 | Resolved: 2019-08-26

## 2021-10-04 ENCOUNTER — HEALTH MAINTENANCE LETTER (OUTPATIENT)
Age: 31
End: 2021-10-04

## 2021-11-28 ENCOUNTER — HEALTH MAINTENANCE LETTER (OUTPATIENT)
Age: 31
End: 2021-11-28

## 2022-03-22 ENCOUNTER — LAB REQUISITION (OUTPATIENT)
Dept: LAB | Facility: HOSPITAL | Age: 32
End: 2022-03-22

## 2022-03-22 DIAGNOSIS — Z57.8 OCCUPATIONAL EXPOSURE TO OTHER RISK FACTORS: ICD-10-CM

## 2022-03-22 PROCEDURE — 36415 COLL VENOUS BLD VENIPUNCTURE: CPT | Performed by: FAMILY MEDICINE

## 2022-03-22 PROCEDURE — 86481 TB AG RESPONSE T-CELL SUSP: CPT | Performed by: FAMILY MEDICINE

## 2022-03-22 SDOH — HEALTH STABILITY - PHYSICAL HEALTH: OCCUPATIONAL EXPOSURE TO OTHER RISK FACTORS: Z57.8

## 2022-03-23 LAB
GAMMA INTERFERON BACKGROUND BLD IA-ACNC: 0.07 IU/ML
M TB IFN-G BLD-IMP: NEGATIVE
M TB IFN-G CD4+ BCKGRND COR BLD-ACNC: 9.93 IU/ML
MITOGEN IGNF BCKGRD COR BLD-ACNC: 0.19 IU/ML
MITOGEN IGNF BCKGRD COR BLD-ACNC: 0.24 IU/ML
QUANTIFERON MITOGEN: 10 IU/ML
QUANTIFERON NIL TUBE: 0.07 IU/ML
QUANTIFERON TB1 TUBE: 0.31 IU/ML
QUANTIFERON TB2 TUBE: 0.26

## 2022-06-13 ENCOUNTER — TRANSFERRED RECORDS (OUTPATIENT)
Dept: HEALTH INFORMATION MANAGEMENT | Facility: CLINIC | Age: 32
End: 2022-06-13

## 2022-06-13 LAB
HPV ABSTRACT: NORMAL
PAP-ABSTRACT: ABNORMAL

## 2022-07-25 ENCOUNTER — TRANSFERRED RECORDS (OUTPATIENT)
Dept: MIDWIFE SERVICES | Facility: CLINIC | Age: 32
End: 2022-07-25

## 2022-08-02 ENCOUNTER — TELEPHONE (OUTPATIENT)
Dept: OBGYN | Facility: CLINIC | Age: 32
End: 2022-08-02

## 2022-08-02 ENCOUNTER — OFFICE VISIT (OUTPATIENT)
Dept: MIDWIFE SERVICES | Facility: CLINIC | Age: 32
End: 2022-08-02
Payer: COMMERCIAL

## 2022-08-02 VITALS
WEIGHT: 132.4 LBS | HEART RATE: 72 BPM | DIASTOLIC BLOOD PRESSURE: 70 MMHG | SYSTOLIC BLOOD PRESSURE: 110 MMHG | HEIGHT: 65 IN | BODY MASS INDEX: 22.06 KG/M2

## 2022-08-02 DIAGNOSIS — Z11.3 ROUTINE SCREENING FOR STI (SEXUALLY TRANSMITTED INFECTION): Primary | ICD-10-CM

## 2022-08-02 DIAGNOSIS — Z01.812 PRE-PROCEDURE LAB EXAM: Primary | ICD-10-CM

## 2022-08-02 DIAGNOSIS — R87.613 HIGH GRADE SQUAMOUS INTRAEPITHELIAL LESION (HGSIL) ON CYTOLOGIC SMEAR OF CERVIX: ICD-10-CM

## 2022-08-02 LAB
CLUE CELLS: ABNORMAL
TRICHOMONAS, WET PREP: ABNORMAL
WBC'S/HIGH POWER FIELD, WET PREP: ABNORMAL
YEAST, WET PREP: ABNORMAL

## 2022-08-02 PROCEDURE — 87491 CHLMYD TRACH DNA AMP PROBE: CPT | Performed by: MIDWIFE

## 2022-08-02 PROCEDURE — 87591 N.GONORRHOEAE DNA AMP PROB: CPT | Performed by: MIDWIFE

## 2022-08-02 PROCEDURE — 99213 OFFICE O/P EST LOW 20 MIN: CPT | Performed by: MIDWIFE

## 2022-08-02 PROCEDURE — 87210 SMEAR WET MOUNT SALINE/INK: CPT | Performed by: MIDWIFE

## 2022-08-02 RX ORDER — ESCITALOPRAM OXALATE 10 MG/1
TABLET ORAL
Status: ON HOLD | COMMUNITY
Start: 2022-07-19 | End: 2023-04-28

## 2022-08-02 RX ORDER — FLUTICASONE PROPIONATE AND SALMETEROL 250; 50 UG/1; UG/1
POWDER RESPIRATORY (INHALATION)
Status: ON HOLD | COMMUNITY
Start: 2021-08-10 | End: 2023-04-28

## 2022-08-02 RX ORDER — POLYETHYLENE GLYCOL 3350 17 G/17G
1 POWDER, FOR SOLUTION ORAL
Status: ON HOLD | COMMUNITY
End: 2023-04-21

## 2022-08-02 RX ORDER — FLUTICASONE PROPIONATE AND SALMETEROL 250; 50 UG/1; UG/1
POWDER RESPIRATORY (INHALATION)
COMMUNITY
Start: 2020-09-02 | End: 2022-11-29

## 2022-08-02 RX ORDER — DEXTROAMPHETAMINE/AMPHETAMINE 10 MG
CAPSULE, EXT RELEASE 24 HR ORAL
COMMUNITY
Start: 2022-07-19 | End: 2022-11-29

## 2022-08-02 RX ORDER — DEXTROAMPHETAMINE SACCHARATE, AMPHETAMINE ASPARTATE, DEXTROAMPHETAMINE SULFATE AND AMPHETAMINE SULFATE 1.25; 1.25; 1.25; 1.25 MG/1; MG/1; MG/1; MG/1
1 TABLET ORAL DAILY
Status: ON HOLD | COMMUNITY
Start: 2021-03-22 | End: 2023-04-28

## 2022-08-03 LAB
C TRACH DNA SPEC QL PROBE+SIG AMP: NEGATIVE
N GONORRHOEA DNA SPEC QL NAA+PROBE: NEGATIVE

## 2022-08-03 NOTE — PROGRESS NOTES
Assessment:   HSIL pap smear, negative HPV  Normal physiologic vaginal discharge s/p BV diagnosis and treatment       Plan:   Long discussion with Brynn about HSIL pap smear as well as the need for further evaluation with colposcopy.  Brynn is very anxious about this and is accepting of a referral to Dr. Jayshree Elam for colposcopy.  Reviewed vaginal hygiene and health, including prevention of pelvic prolapse including exercises for pelvic floor strenghting.  Advised STI testing, which Brynn declines today reporting she recently had testing in  of this year.  Discussed safe sex practices and use of condoms for protection from STI and pregnancy.  Wet prep completed, will treat pending results.  Return to clinic for colposcopy with Dr. Elam.  Assisted with scheduling of appointment.      KWAKU Marroquin, MAHAMED  15 minutes on the date of the encounter doing chart review, review of outside records, interpretation of tests, patient visit and documentation        Subjective:     Kary Sebastian is a 31 year old female  who presents for follow up after abnormal pap smear in , and well as vaginal discharge changes with recent history of BV.  Kary had a pap smear collected by [facility] on 22 which revealed HSIL pap, HPV negative.  She reports her mother was recently diagnosed with cervical cancer, although she has few details about her biological mothers diagnosis.  In addition she was recently diagnosed with BV after she was found to have a retained tampon which was removed successfully by her PCP.  She was treated for BV with antibiotics, and per patient she completed the full course of antibiotics.  She would like reassurance today that the BV is resolved. In addition, she has concerns about a possible prolapse of her vaginal tissue, as she thought she saw vaginal tissue at her vaginal opening. She is not currently using birth control, and is possibly planning a pregnancy in the  future.  She is currently sexually active.  She reports regular menstrual cycles every 25-28 days.  LMP was 7/13/22.        Review of Systems  A comprehensive review of systems was negative.      Objective:   External exam: Absent hair on mons pubis, labia are intact and without lesions. No evidence of vaginal prolpase upon external exam.  With valsalva, no evidence of vaginal, bladder or cervical prolapse.  SSE utilized to visualize the vaginal vault.  Minimal discharge noted in posterior fornix. Vaginal walls are pink and glistening.  No malodor detected. Cervix is parous in nature, no visible lesions noted on cervix. No cervical discharge noted.  Wet prep collected.   Bimanual exam: negative CMT. Adnexa are palpated bilaterally and non tender with palpation. Cervix is midline and uterus is anteverted in midposition.      Laboratory:   Wet prep

## 2022-09-11 ENCOUNTER — HEALTH MAINTENANCE LETTER (OUTPATIENT)
Age: 32
End: 2022-09-11

## 2022-09-22 ENCOUNTER — OFFICE VISIT (OUTPATIENT)
Dept: OBGYN | Facility: CLINIC | Age: 32
End: 2022-09-22
Payer: COMMERCIAL

## 2022-09-22 ENCOUNTER — LAB (OUTPATIENT)
Dept: LAB | Facility: CLINIC | Age: 32
End: 2022-09-22

## 2022-09-22 DIAGNOSIS — Z01.812 PRE-PROCEDURE LAB EXAM: ICD-10-CM

## 2022-09-22 DIAGNOSIS — Z53.9 ERRONEOUS ENCOUNTER--DISREGARD: Primary | ICD-10-CM

## 2022-09-22 DIAGNOSIS — R87.613 HIGH GRADE SQUAMOUS INTRAEPITHELIAL LESION (HGSIL) ON CYTOLOGIC SMEAR OF CERVIX: ICD-10-CM

## 2022-09-29 ENCOUNTER — OFFICE VISIT (OUTPATIENT)
Dept: OBGYN | Facility: CLINIC | Age: 32
End: 2022-09-29
Payer: COMMERCIAL

## 2022-09-29 VITALS
HEIGHT: 65 IN | BODY MASS INDEX: 21.83 KG/M2 | SYSTOLIC BLOOD PRESSURE: 108 MMHG | WEIGHT: 131 LBS | DIASTOLIC BLOOD PRESSURE: 62 MMHG | HEART RATE: 101 BPM

## 2022-09-29 DIAGNOSIS — R87.613 PAPANICOLAOU SMEAR OF CERVIX WITH HIGH GRADE SQUAMOUS INTRAEPITHELIAL LESION (HGSIL): Primary | ICD-10-CM

## 2022-09-29 PROCEDURE — 88305 TISSUE EXAM BY PATHOLOGIST: CPT | Performed by: PATHOLOGY

## 2022-09-29 PROCEDURE — 57456 ENDOCERV CURETTAGE W/SCOPE: CPT | Performed by: OBSTETRICS & GYNECOLOGY

## 2022-09-29 PROCEDURE — 88342 IMHCHEM/IMCYTCHM 1ST ANTB: CPT | Performed by: PATHOLOGY

## 2022-09-29 NOTE — PROGRESS NOTES
"Colposcopy Procedure Note    Indications: Pap smear on 6/14/22 showed HSIL with negative HRHPV.      Procedure Details   /62 (BP Location: Right arm, Patient Position: Sitting, Cuff Size: Adult Regular)   Pulse 101   Ht 1.657 m (5' 5.25\")   Wt 59.4 kg (131 lb)   LMP 09/21/2022 (Approximate)   Body mass index is 21.63 kg/m .    The reason for procedure is explained, and informed consent is obtained.  Urine hCG is not done as she is finishing her period now.    Speculum is placed in the vagina and visualization of cervix is achieved. The cervix is swabbed with 3% acetic acid solution. Transformation zone is easily seen.  Green filter is applied with no abnormal vessels seen.  Acetowhite epithelium is not seen.  ECC is done.  Adequate colposcopy.  The patient tolerated the procedure well.    Impression: normal ectocervix    Plan: Post procedure instructions are reviewed.  She will be contacted with ECC results.  With negative HPV and her age, if the ECC is MADELEINE I or less, option of follow up in 6 months given versus proceeding with LEEP for discrepant Pap.  We discussed the role of the immune system and ways to keep it strong.    "

## 2022-10-04 LAB
PATH REPORT.COMMENTS IMP SPEC: NORMAL
PATH REPORT.FINAL DX SPEC: NORMAL
PATH REPORT.GROSS SPEC: NORMAL
PATH REPORT.MICROSCOPIC SPEC OTHER STN: NORMAL
PATH REPORT.RELEVANT HX SPEC: NORMAL
PHOTO IMAGE: NORMAL

## 2022-10-06 ENCOUNTER — PATIENT OUTREACH (OUTPATIENT)
Dept: OBGYN | Facility: CLINIC | Age: 32
End: 2022-10-06

## 2022-10-19 ENCOUNTER — E-VISIT (OUTPATIENT)
Dept: URGENT CARE | Facility: CLINIC | Age: 32
End: 2022-10-19
Payer: COMMERCIAL

## 2022-10-19 DIAGNOSIS — N94.9 VAGINAL DISCOMFORT: Primary | ICD-10-CM

## 2022-10-19 PROCEDURE — 99207 PR NON-BILLABLE SERV PER CHARTING: CPT | Performed by: NURSE PRACTITIONER

## 2022-10-20 NOTE — PATIENT INSTRUCTIONS
Dear Kary Sebastian,    We are sorry you are not feeling well. Based on the responses you provided, it is recommended that you be seen in-person in urgent care so we can better evaluate your symptoms. Please click here to find the nearest urgent care location to you.   You will not be charged for this Visit. Thank you for trusting us with your care.    Sarah Mercado NP

## 2022-11-11 ENCOUNTER — NURSE TRIAGE (OUTPATIENT)
Dept: NURSING | Facility: CLINIC | Age: 32
End: 2022-11-11

## 2022-11-11 DIAGNOSIS — Z32.01 PREGNANCY TEST POSITIVE: Primary | ICD-10-CM

## 2022-11-11 NOTE — TELEPHONE ENCOUNTER
TC:  Kary calling to discuss early pregnancy symptoms. LMP 10/16/22, ega 3w5d.  Reports two positive home pregnancy tests.  Mild uterine cramping, a bit right sided, similar to menstrual cramping, fatigue (desire to lay down to feel comfortable), occ mild lower back pain, and mild dizziness.    Would like serial quant Hcg levels, ordered, and early ultrasound when able, also ordered and discussed timing- encouraged to schedule closer to 11/28/22 to increase likelihood of seeing fetus/sac.  Reviewed normal early pregnancy symptoms vs. Symptoms concerning for ectopic pregnancy and when to seek further care (e.g. worsening pain).  No further questions at this time, will follow up 11/29 with MH in clinic, or sooner as needed, or based on lab results.  Given phone number for clinic to reach out to with any further questions or concerns.    KWAKU Bone CNM CLC  11/11/2022 1:25 PM

## 2022-11-11 NOTE — TELEPHONE ENCOUNTER
"OB Triage Call    Is patient's OB/Midwife with the [facility]?   Pt reports seeing [facility] Midwife (Karen Edwards) in the past and has upcoming appt with her later this month for current pregnancy.  Pt has not yet seen Midwife-Karen for current pregnancy.  _______________    Pt reports two positive home pregnancy tests.  Also went to \"[facility]\" -> positive test there yesterday (11/10/22).  Menses would be due within the past 24 hours.    \"Now having some abdominal cramping.\"  \"Nervous because of previous miscarriage.\"    Pt difficult to triage.  Answers questions with annoyance.  States \"I'm lightheaded and dizzy.\"  \"Probably because I'm dehydrated.\"  \"I don't like drinking water.\"  Advised pt she may certainly drink other things (such as Gatorade, Powerade), eloise since no nausea or vomiting.  Pt does not respond.  Staes \"I'm currently just crouched in a chair.\"  Able to stand upright nevertheless.    No shoulder pain.  No continuous abdominal pain.  No vag bleeding or spotting whatsoever.    Due to current intermittent abd cramping, pt wishes to speak with CNM-Karen Edwards.  Pt states \"She knows me; I'm comfortable with her.\"  Pt would like orders for HCG levels.    Pt's callback # -> [phone]     Thank you-    [facility] Nurse Advisor     Reason for Disposition    Lightheadedness or dizziness    Additional Information    Negative: Passed out (i.e., fainted, collapsed and was not responding)    Negative: Shock suspected (e.g., cold/pale/clammy skin, too weak to stand, low BP, rapid pulse)    Negative: Difficult to awaken or acting confused (e.g., disoriented, slurred speech)    Negative: Sounds like a life-threatening emergency to the triager    Negative: Abdominal pain and pregnant 20 or more weeks    Negative: MODERATE-SEVERE abdominal pain    Negative: SEVERE vaginal bleeding (e.g., soaking 2 pads or tampons per hour and present 2 or more hours; 1 menstrual cup every 2 " hours)    Negative: MODERATE vaginal bleeding (e.g., soaking 1 pad or tampon per hour and present > 6 hours; 1 menstrual cup every 6 hours)    Negative: MODERATE vaginal bleeding and pregnant > 12 weeks    Negative: Passed tissue (e.g., gray-white)    Negative: Vomiting and contains red blood or black ('coffee ground') material    Negative: Shoulder pain    Negative: MILD constant abdominal pain (e.g., doesn't interfere with normal activities) and present > 2 hours    Negative: Intermittent lower abdominal pain lasting > 24 hours    Negative: Vaginal bleeding or spotting    Negative: White of the eyes have turned yellow (i.e., jaundice)    Protocols used: PREGNANCY - ABDOMINAL PAIN LESS THAN 20 WEEKS EGA-A-OH

## 2022-11-12 ENCOUNTER — LAB (OUTPATIENT)
Dept: LAB | Facility: CLINIC | Age: 32
End: 2022-11-12
Payer: COMMERCIAL

## 2022-11-12 DIAGNOSIS — Z32.01 PREGNANCY TEST POSITIVE: ICD-10-CM

## 2022-11-12 PROCEDURE — 36415 COLL VENOUS BLD VENIPUNCTURE: CPT

## 2022-11-12 PROCEDURE — 84702 CHORIONIC GONADOTROPIN TEST: CPT

## 2022-11-13 ENCOUNTER — NURSE TRIAGE (OUTPATIENT)
Dept: NURSING | Facility: CLINIC | Age: 32
End: 2022-11-13

## 2022-11-13 LAB — B-HCG SERPL-ACNC: 230 IU/L

## 2022-11-13 NOTE — TELEPHONE ENCOUNTER
Patient has confirmed pregnancy test and has concerns about Hcg levels,had lab drawn on the 11th as she was having some mild cramping. No bleeding, no cramping today or yesterday. Asking about the 230 level.   Message was sent to patient in my chart from provider to have another Hcg level within 48 hours to compare.   Disposition: Care advise given and patient will have lab drawn tomorrow.   Little Walsh RN on 11/13/2022 at 3:51 PM    Additional Information    Negative: Severe vaginal bleeding (e.g., continuous red blood from vagina, or large blood clots)    Negative: Shock suspected (very weak, limp, too weak to stand, pale cool skin)    Negative: Severe difficulty breathing (e.g., struggling for each breath, speaks in single words)    Negative: Umbilical cord hanging out of the vagina (shiny, white, curled appearance)    Negative: Uncontrollable urge to push (i.e., feels like baby is coming out now) or can see baby    Negative: Seizure occurred and has stopped    Negative: Sounds like a life-threatening emergency to the triager    Negative: [1] Teen is pregnant AND [2] ADULT pregnancy guidelines are available,  go to the appropriate ADULT pregnancy symptom guideline    Negative: [1] Pregnancy suspected BUT [2] no positive pregnancy test AND [3] abdominal pain    Negative: [1] Pregnancy suspected BUT [2] no positive pregnancy test AND [3] vaginal bleeding    Negative: [1] Pregnancy suspected BUT [2] no positive pregnancy test AND [3] no urgent symptoms    Negative: [1] Pregnant AND [2] abdominal injury or MVA    Negative: [1] Leakage of clear fluid from vagina AND [2] less than 37 weeks    Negative: Active labor suspected (e.g., painful, frequent contractions)    Negative: [1] Abdominal pain AND [2] MODERATE or SEVERE (pain interferes with normal activities or awakens from sleep)    Negative: [1] Vaginal bleeding AND [2] has placenta previa    Negative: Vaginal bleeding (Exception: Single episode of mild spotting  after wiping, intercourse or pelvic exam)    Negative: [1] Baby moving less today AND [2] more than 28 weeks pregnant    Negative: Chest pain    Negative: [1] Difficulty breathing or shortness of breath AND [2] new onset or worsening    Negative: Triager suspects serious pregnancy-related symptom    Negative: Patient sounds very sick or weak to the triager    Negative: [1] SEVERE vomiting (e.g., 8 or more times / day) AND [2] present > 8 hours    Negative: [1] Drinking very little AND [2] dehydration suspected (e.g., no urine > 12 hours, very dry mouth, very lightheaded)    Negative: Severe UTI symptoms or pyelonephritis suspected (e.g., side or back pain, blood in urine, severe pain on urination, fever, etc)    Negative: [1] MILD abdominal pain AND [2] constant > 2 hours    Negative: [1] Calf pain and swelling AND [2] only one side    Negative: Mild contractions (or abdominal cramping)    Negative: [1] Mild abdominal pain AND [2] constant < 2 hours    Negative: [1] Leakage of clear fluid from vagina AND [2] 37 weeks or more    Negative: Headache that is severe or with vision change    Negative: [1] Face, arm or hand swelling AND [2] new-onset    Negative: [1] 20-36 weeks pregnant AND [2] lower back discomfort not relieved by rest    Negative: Fever > 100.4 F (38 C)    Negative: [1] Baby moving less today AND [2] 24-28 weeks pregnant    Negative: Vomiting more than once    Negative: [1] Currently on Modified (or complete) Bed Rest plan AND [2] patient concerned about continuing or worsening symptoms    Negative: [1] Caller has URGENT pregnancy question AND [2] triager unable to answer question    Negative: Mild UTI symptoms    Negative: Abnormal color vaginal discharge (i.e., yellow, green, gray)    Negative: [1] Baby moving less today AND [2] less than 24 weeks pregnant    Negative: [1] Caller has NON-URGENT pregnancy question AND [2] triager unable to answer question    Negative: [1] Triager thinks patient needs  to be seen today or tomorrow AND [2] patient is stable    Negative: [1] Chronic or recurrent symptom AND [2] not improved with prior advice    Negative: [1] Triager thinks patient needs to be seen within the next 3 days AND [2] patient is stable    Negative: [1] Morning sickness AND [2] 1st Trimester    Negative: [1] Vaginal bleeding (spotting) - Mild AND [2] One Time    Negative: [1] Heartburn - Mild AND [2] 2nd or 3rd trimester    Negative: [1] Swollen feet (edema) - Mild AND [2] 3rd trimester    Negative: [1] Lower back pain - Mild AND [2] 3rd trimester    Negative: ALSO, Normal pregnancy -  how to stay healthy    Negative: [1] Caller has pregnancy question AND [2] triage nurse able to answer question    Protocols used: PREGNANCY [id number]

## 2022-11-14 ENCOUNTER — LAB (OUTPATIENT)
Dept: LAB | Facility: CLINIC | Age: 32
End: 2022-11-14
Payer: COMMERCIAL

## 2022-11-14 ENCOUNTER — MYC MEDICAL ADVICE (OUTPATIENT)
Dept: MIDWIFE SERVICES | Facility: CLINIC | Age: 32
End: 2022-11-14

## 2022-11-14 DIAGNOSIS — Z32.01 PREGNANCY TEST POSITIVE: ICD-10-CM

## 2022-11-14 LAB — B-HCG SERPL-ACNC: 404 IU/L (ref 0–5)

## 2022-11-14 PROCEDURE — 84702 CHORIONIC GONADOTROPIN TEST: CPT

## 2022-11-14 PROCEDURE — 36415 COLL VENOUS BLD VENIPUNCTURE: CPT

## 2022-11-15 DIAGNOSIS — Z87.59 HISTORY OF MISCARRIAGE: Primary | ICD-10-CM

## 2022-11-16 ENCOUNTER — LAB (OUTPATIENT)
Dept: LAB | Facility: CLINIC | Age: 32
End: 2022-11-16
Payer: COMMERCIAL

## 2022-11-16 DIAGNOSIS — Z87.59 HISTORY OF MISCARRIAGE: ICD-10-CM

## 2022-11-16 LAB — HCG INTACT+B SERPL-ACNC: 927 MIU/ML

## 2022-11-16 PROCEDURE — 36415 COLL VENOUS BLD VENIPUNCTURE: CPT

## 2022-11-16 PROCEDURE — 84702 CHORIONIC GONADOTROPIN TEST: CPT

## 2022-11-16 NOTE — TELEPHONE ENCOUNTER
Phone call re: lab results.     hCG Quantitative 11/16/2022 927 (H)  0 - 5 IU/L Final   Adult: 0-5 IU/L for healthy non-pregnant person  Neonates: Should be within normal ranges by 2 days after birth     Lab on 11/14/2022   Component Date Value Ref Range Status     hCG Quantitative 11/14/2022 404 (H)  0 - 5 IU/L Final    Adult: 0-5 IU/L for healthy non-pregnant person  Neonates: Should be within normal ranges by 2 days after birth   Lab on 11/12/2022   Component Date Value Ref Range Status     hCG Quantitative 11/12/2022 230  IU/L Final    Adult: 0-5 IU/L for healthy non-pregnant person  Neonates: Should be within normal ranges by 2 days after birth       LMP: 10/16/2022  4w3d    11/9/22: +UPT    I reached Brynn by phone, and we discussed the lab results and the current plan of care.   She has an US scheduled for 11/28/22. She is very nervous about this pregnancy. She has experienced a miscarriage. She is concerned that she may have twins. I reviewed that with normally rising quants, we may be able to move the ultrasound up, and she expresses preference for having US on Wednesday 11/23 next week.     See Telephone encounter from 11/11/2022  At that time MAHAMED recommended first trimester US for dating and viability at 11/28/22     I reviewed that sometimes a repeat US is indicated when they are performed early, and fetal structures are not able to be fully visualized. She verbalizes understanding this, desires US as soon as possible.     No further HCG quants. Bleeding precautions reviewed.     She has  appt on 11/29/22.     KWAKU Strauss, MAHAMED  [facility] Nurse-Midwifery  [facility]      11/16/2022 3:44 PM

## 2022-11-23 ENCOUNTER — HOSPITAL ENCOUNTER (OUTPATIENT)
Dept: ULTRASOUND IMAGING | Facility: CLINIC | Age: 32
Discharge: HOME | End: 2022-11-23
Attending: ADVANCED PRACTICE MIDWIFE | Admitting: ADVANCED PRACTICE MIDWIFE
Payer: COMMERCIAL

## 2022-11-23 ENCOUNTER — TELEPHONE (OUTPATIENT)
Dept: MIDWIFE SERVICES | Facility: CLINIC | Age: 32
End: 2022-11-23

## 2022-11-23 DIAGNOSIS — Z32.01 PREGNANCY TEST POSITIVE: ICD-10-CM

## 2022-11-23 PROCEDURE — 76801 OB US < 14 WKS SINGLE FETUS: CPT

## 2022-11-23 PROCEDURE — 76802 OB US < 14 WKS ADDL FETUS: CPT

## 2022-11-24 NOTE — TELEPHONE ENCOUNTER
Pt called CNM on call with questions re: ultrasound showing twins.  Wondering if they are identical or fraternal.  Reviewed, unable to tell at this point.  Pt asking about delivery via C/S, timing, etc.  Encouraged to establish care with OBGYN.  Has appt with CNM on Tuesday, can facilitate transfer of care at that visit.  Encouraged to call with questions/concerns prn.

## 2022-11-29 ENCOUNTER — OFFICE VISIT (OUTPATIENT)
Dept: MIDWIFE SERVICES | Facility: CLINIC | Age: 32
End: 2022-11-29
Payer: COMMERCIAL

## 2022-11-29 VITALS
WEIGHT: 137 LBS | SYSTOLIC BLOOD PRESSURE: 90 MMHG | HEIGHT: 65 IN | DIASTOLIC BLOOD PRESSURE: 52 MMHG | BODY MASS INDEX: 22.82 KG/M2 | HEART RATE: 72 BPM

## 2022-11-29 DIAGNOSIS — Z23 NEED FOR PROPHYLACTIC VACCINATION AND INOCULATION AGAINST INFLUENZA: ICD-10-CM

## 2022-11-29 DIAGNOSIS — N92.6 MISSED MENSES: Primary | ICD-10-CM

## 2022-11-29 PROCEDURE — 90686 IIV4 VACC NO PRSV 0.5 ML IM: CPT | Performed by: MIDWIFE

## 2022-11-29 PROCEDURE — 90471 IMMUNIZATION ADMIN: CPT | Performed by: MIDWIFE

## 2022-11-29 PROCEDURE — 99213 OFFICE O/P EST LOW 20 MIN: CPT | Mod: 25 | Performed by: MIDWIFE

## 2022-11-29 RX ORDER — ALBUTEROL SULFATE 90 UG/1
2 AEROSOL, METERED RESPIRATORY (INHALATION) EVERY 4 HOURS
COMMUNITY
Start: 2021-06-28

## 2022-11-29 RX ORDER — TIZANIDINE 2 MG/1
TABLET ORAL EVERY 6 HOURS
Status: ON HOLD | COMMUNITY
Start: 2021-06-28 | End: 2023-04-24

## 2022-11-29 RX ORDER — ONDANSETRON 4 MG/1
4 TABLET, ORALLY DISINTEGRATING ORAL EVERY 8 HOURS PRN
Qty: 20 TABLET | Refills: 1 | Status: SHIPPED | OUTPATIENT
Start: 2022-11-29 | End: 2022-12-01

## 2022-11-29 RX ORDER — ALBUTEROL SULFATE 0.83 MG/ML
2.5 SOLUTION RESPIRATORY (INHALATION)
COMMUNITY

## 2022-11-29 RX ORDER — ALPRAZOLAM 0.5 MG
TABLET ORAL
Status: ON HOLD | COMMUNITY
Start: 2022-02-21 | End: 2023-04-24

## 2022-11-29 ASSESSMENT — ASTHMA QUESTIONNAIRES: ACT_TOTALSCORE: 21

## 2022-11-29 NOTE — PROGRESS NOTES
"  Assessment & Plan     Missed menses  Twin pregnancy confirmed by ultrasound, 6 weeks 2 days with EDC of 23  - Prenatal MV & Min w/FA-DHA (PRENATAL ADULT GUMMY/DHA/FA) 0.4-25 MG CHEW; Take 1 tablet by mouth daily  - ondansetron (ZOFRAN ODT) 4 MG ODT tab; Take 1 tablet (4 mg) by mouth every 8 hours as needed for nausea  -referral to [facility] for management of twin pregnancy.  Phone numbers for scheduling provided.  -Danger s/sx for first trimester of pregnancy reviewed.      Need for prophylactic vaccination and inoculation against influenza  - INFLUENZA VACCINE IM > 6 MONTHS VALENT IIV4 (AFLURIA/FLUZONE)    20 minutes on the date of the encounter doing chart review, review of outside records, patient visit and documentation      [facility]    Mustapha Swain is a 32 year old accompanied by her daughter, presenting for the following health issues:  Abdominal Cramping and Imm/Inj (Flu Shot)      HPI     Brynn is a 32 year old  presenting for confirmation of pregnancy and discussion of twin pregnancy.  She reports this was an unplanned pregnancy, but welcomed.  Her LMP was 10/16/22.  She had an early dating US and it was discovered she had a twin pregnancy, with EDC 23.  She is concerned about cramping in her RLQ.  She reports having nausea with her pregnancies in the past, would like rx for zofran in case of nausea. Symptoms currently include fatigue, with some increase in anxiety.  Brynn would like to discuss where to receive her follow up care, as we discussed [facility]'s do not care for twin pregnancies.    Review of Systems   Constitutional, HEENT, cardiovascular, pulmonary, gi and gu systems are negative, except as otherwise noted.      Objective    BP 90/52   Pulse 72   Ht 1.657 m (5' 5.25\")   Wt 62.1 kg (137 lb)   LMP 10/16/2022   BMI 22.62 kg/m    Body mass index is 22.62 kg/m .  Physical Exam   Attempted to hear " FHT, unable due to early gestation.

## 2022-12-01 ENCOUNTER — TELEPHONE (OUTPATIENT)
Dept: MIDWIFE SERVICES | Facility: CLINIC | Age: 32
End: 2022-12-01

## 2022-12-01 ENCOUNTER — HOSPITAL ENCOUNTER (EMERGENCY)
Facility: CLINIC | Age: 32
Discharge: HOME | End: 2022-12-01
Attending: EMERGENCY MEDICINE | Admitting: EMERGENCY MEDICINE
Payer: COMMERCIAL

## 2022-12-01 ENCOUNTER — APPOINTMENT (OUTPATIENT)
Dept: ULTRASOUND IMAGING | Facility: CLINIC | Age: 32
End: 2022-12-01
Attending: EMERGENCY MEDICINE
Payer: COMMERCIAL

## 2022-12-01 ENCOUNTER — NURSE TRIAGE (OUTPATIENT)
Dept: NURSING | Facility: CLINIC | Age: 32
End: 2022-12-01

## 2022-12-01 VITALS
RESPIRATION RATE: 16 BRPM | HEART RATE: 66 BPM | TEMPERATURE: 98.1 F | DIASTOLIC BLOOD PRESSURE: 67 MMHG | OXYGEN SATURATION: 98 % | SYSTOLIC BLOOD PRESSURE: 112 MMHG

## 2022-12-01 DIAGNOSIS — R10.2 PELVIC CRAMPING: ICD-10-CM

## 2022-12-01 DIAGNOSIS — O30.001 TWIN GESTATION IN FIRST TRIMESTER, UNSPECIFIED MULTIPLE GESTATION TYPE: ICD-10-CM

## 2022-12-01 DIAGNOSIS — J10.1 INFLUENZA A: ICD-10-CM

## 2022-12-01 DIAGNOSIS — R11.0 NAUSEA: ICD-10-CM

## 2022-12-01 LAB
ANION GAP SERPL CALCULATED.3IONS-SCNC: 15 MMOL/L (ref 7–15)
BASOPHILS # BLD AUTO: 0 10E3/UL (ref 0–0.2)
BASOPHILS NFR BLD AUTO: 0 %
BUN SERPL-MCNC: 4.6 MG/DL (ref 6–20)
CALCIUM SERPL-MCNC: 9.8 MG/DL (ref 8.6–10)
CHLORIDE SERPL-SCNC: 99 MMOL/L (ref 98–107)
CREAT SERPL-MCNC: 0.51 MG/DL (ref 0.51–0.95)
DEPRECATED HCO3 PLAS-SCNC: 23 MMOL/L (ref 22–29)
EOSINOPHIL # BLD AUTO: 0 10E3/UL (ref 0–0.7)
EOSINOPHIL NFR BLD AUTO: 0 %
ERYTHROCYTE [DISTWIDTH] IN BLOOD BY AUTOMATED COUNT: 12 % (ref 10–15)
FLUAV RNA SPEC QL NAA+PROBE: POSITIVE
FLUBV RNA RESP QL NAA+PROBE: NEGATIVE
GFR SERPL CREATININE-BSD FRML MDRD: >90 ML/MIN/1.73M2
GLUCOSE SERPL-MCNC: 81 MG/DL (ref 70–99)
HCG INTACT+B SERPL-ACNC: ABNORMAL MIU/ML
HCT VFR BLD AUTO: 40.7 % (ref 35–47)
HGB BLD-MCNC: 13.6 G/DL (ref 11.7–15.7)
HOLD SPECIMEN: NORMAL
HOLD SPECIMEN: NORMAL
IMM GRANULOCYTES # BLD: 0 10E3/UL
IMM GRANULOCYTES NFR BLD: 0 %
LYMPHOCYTES # BLD AUTO: 0.8 10E3/UL (ref 0.8–5.3)
LYMPHOCYTES NFR BLD AUTO: 10 %
MCH RBC QN AUTO: 31.6 PG (ref 26.5–33)
MCHC RBC AUTO-ENTMCNC: 33.4 G/DL (ref 31.5–36.5)
MCV RBC AUTO: 94 FL (ref 78–100)
MONOCYTES # BLD AUTO: 0.9 10E3/UL (ref 0–1.3)
MONOCYTES NFR BLD AUTO: 11 %
NEUTROPHILS # BLD AUTO: 6.5 10E3/UL (ref 1.6–8.3)
NEUTROPHILS NFR BLD AUTO: 79 %
NRBC # BLD AUTO: 0 10E3/UL
NRBC BLD AUTO-RTO: 0 /100
PLATELET # BLD AUTO: 237 10E3/UL (ref 150–450)
POTASSIUM SERPL-SCNC: 3.7 MMOL/L (ref 3.4–5.3)
RBC # BLD AUTO: 4.31 10E6/UL (ref 3.8–5.2)
RSV RNA SPEC NAA+PROBE: NEGATIVE
SARS-COV-2 RNA RESP QL NAA+PROBE: NEGATIVE
SODIUM SERPL-SCNC: 137 MMOL/L (ref 136–145)
WBC # BLD AUTO: 8.3 10E3/UL (ref 4–11)

## 2022-12-01 PROCEDURE — 96374 THER/PROPH/DIAG INJ IV PUSH: CPT

## 2022-12-01 PROCEDURE — 250N000011 HC RX IP 250 OP 636: Performed by: EMERGENCY MEDICINE

## 2022-12-01 PROCEDURE — 250N000013 HC RX MED GY IP 250 OP 250 PS 637: Performed by: EMERGENCY MEDICINE

## 2022-12-01 PROCEDURE — 258N000003 HC RX IP 258 OP 636: Performed by: EMERGENCY MEDICINE

## 2022-12-01 PROCEDURE — 82310 ASSAY OF CALCIUM: CPT | Performed by: EMERGENCY MEDICINE

## 2022-12-01 PROCEDURE — 99285 EMERGENCY DEPT VISIT HI MDM: CPT | Mod: CS,25

## 2022-12-01 PROCEDURE — 76801 OB US < 14 WKS SINGLE FETUS: CPT

## 2022-12-01 PROCEDURE — 96375 TX/PRO/DX INJ NEW DRUG ADDON: CPT

## 2022-12-01 PROCEDURE — 36415 COLL VENOUS BLD VENIPUNCTURE: CPT | Performed by: EMERGENCY MEDICINE

## 2022-12-01 PROCEDURE — C9803 HOPD COVID-19 SPEC COLLECT: HCPCS

## 2022-12-01 PROCEDURE — 84702 CHORIONIC GONADOTROPIN TEST: CPT | Performed by: EMERGENCY MEDICINE

## 2022-12-01 PROCEDURE — 87637 SARSCOV2&INF A&B&RSV AMP PRB: CPT | Performed by: EMERGENCY MEDICINE

## 2022-12-01 PROCEDURE — 94640 AIRWAY INHALATION TREATMENT: CPT

## 2022-12-01 PROCEDURE — 96361 HYDRATE IV INFUSION ADD-ON: CPT

## 2022-12-01 PROCEDURE — 85025 COMPLETE CBC W/AUTO DIFF WBC: CPT | Performed by: EMERGENCY MEDICINE

## 2022-12-01 RX ORDER — OSELTAMIVIR PHOSPHATE 75 MG/1
75 CAPSULE ORAL 2 TIMES DAILY
Qty: 10 CAPSULE | Refills: 0 | Status: SHIPPED | OUTPATIENT
Start: 2022-12-01 | End: 2022-12-06

## 2022-12-01 RX ORDER — ALBUTEROL SULFATE 90 UG/1
2 AEROSOL, METERED RESPIRATORY (INHALATION) ONCE
Status: COMPLETED | OUTPATIENT
Start: 2022-12-01 | End: 2022-12-01

## 2022-12-01 RX ORDER — ONDANSETRON 4 MG/1
4 TABLET, ORALLY DISINTEGRATING ORAL EVERY 8 HOURS PRN
Qty: 10 TABLET | Refills: 0 | Status: SHIPPED | OUTPATIENT
Start: 2022-12-01 | End: 2022-12-04

## 2022-12-01 RX ORDER — ONDANSETRON 2 MG/ML
4 INJECTION INTRAMUSCULAR; INTRAVENOUS ONCE
Status: COMPLETED | OUTPATIENT
Start: 2022-12-01 | End: 2022-12-01

## 2022-12-01 RX ORDER — ACETAMINOPHEN 500 MG
1000 TABLET ORAL ONCE
Status: COMPLETED | OUTPATIENT
Start: 2022-12-01 | End: 2022-12-01

## 2022-12-01 RX ADMIN — HUMAN RHO(D) IMMUNE GLOBULIN 300 MCG: 1500 SOLUTION INTRAMUSCULAR; INTRAVENOUS at 18:21

## 2022-12-01 RX ADMIN — ONDANSETRON 4 MG: 2 INJECTION INTRAMUSCULAR; INTRAVENOUS at 18:21

## 2022-12-01 RX ADMIN — SODIUM CHLORIDE 1000 ML: 9 INJECTION, SOLUTION INTRAVENOUS at 13:59

## 2022-12-01 RX ADMIN — ACETAMINOPHEN 1000 MG: 500 TABLET ORAL at 14:00

## 2022-12-01 RX ADMIN — ALBUTEROL SULFATE 2 PUFF: 90 AEROSOL, METERED RESPIRATORY (INHALATION) at 14:00

## 2022-12-01 ASSESSMENT — ENCOUNTER SYMPTOMS
VOMITING: 0
CHILLS: 1
MYALGIAS: 1
COUGH: 1
ABDOMINAL PAIN: 1
NAUSEA: 1

## 2022-12-01 ASSESSMENT — ACTIVITIES OF DAILY LIVING (ADL)
ADLS_ACUITY_SCORE: 35
ADLS_ACUITY_SCORE: 35
ADLS_ACUITY_SCORE: 33

## 2022-12-01 NOTE — TELEPHONE ENCOUNTER
Nurse Triage SBAR    Is this a 2nd Level Triage? NO    Situation: Difficulty breathing    Background: patient calling-she is audibly short of breath on the call. States that she cant breathe, her BP is 142-110 and her 02 sats are 72%. States she got the flu shot from her midwife last week and states that he daughter has RSV.     Assessment: Difficulty breathing    Protocol Recommended Disposition:   Call 911 now    Recommendation:     Patient only speaking in single words and is refusing to call 911. Patient is at work and unable to tell me the address. Writer told patient to put a co-worker on the phone. Co-worker got on the phone and is calling 911 now.      NO       PIETER ORELLANA RN      Does the patient meet one of the following criteria for ADS visit consideration? 16+ years old, with an [facility] PCP     TIP  Providers, please consider if this condition is appropriate for management at one of our Acute and Diagnostic Services sites.     If patient is a good candidate, please use dotphrase <dot>triageresponse and select Refer to ADS to document.    Reason for Disposition    SEVERE difficulty breathing (e.g., struggling for each breath, speaks in single words, pulse > 120)    Protocols used: BREATHING DIFFICULTY-A-OH

## 2022-12-01 NOTE — ED PROVIDER NOTES
History     Chief Complaint:  Anxiety       HPI   Kary Sebastian is a 32 year old female G7,  approximately 6 weeks pregnant who presents to the ED with the above-stated concerns.  She noted her symptoms of cough and congestion as well as hot and cold flashes, nausea, pelvic cramping and body aches began last night.  Her daughter has influenza.  She notes she took a hot bath last night is concerned that she may have injured her fetus.  She has a cramping has persisted into today and she still feels all the other symptoms of presented to the emergency department.  She denies vaginal bleeding or leakage of fluid.  She denies concerns for STI.  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    ROS:  Review of Systems   Constitutional: Positive for chills.   HENT: Positive for congestion.    Respiratory: Positive for cough.    Gastrointestinal: Positive for abdominal pain and nausea. Negative for vomiting.   Genitourinary: Positive for pelvic pain. Negative for vaginal bleeding and vaginal discharge.   Musculoskeletal: Positive for myalgias.   All other systems reviewed and are negative.      Allergies:  Prochlorperazine  Adhesive Tape  Codeine  Gluten Meal  Ketorolac Tromethamine     Medications:    albuterol (PROAIR HFA/PROVENTIL HFA/VENTOLIN HFA) 108 (90 Base) MCG/ACT inhaler  albuterol (PROVENTIL) (2.5 MG/3ML) 0.083% neb solution  ALPRAZolam (XANAX) 0.5 MG tablet  amphetamine-dextroamphetamine (ADDERALL) 5 MG tablet  cholecalciferol 50 MCG (2000 UT) CAPS  escitalopram (LEXAPRO) 10 MG tablet  fluticasone-salmeterol (ADVAIR) 250-50 MCG/ACT inhaler  medical cannabis (Patient's own supply)  ondansetron (ZOFRAN ODT) 4 MG ODT tab  polyethylene glycol (MIRALAX) 17 GM/Dose powder  Prenatal MV & Min w/FA-DHA (PRENATAL ADULT GUMMY/DHA/FA) 0.4-25 MG CHEW  Prenatal Vit-Fe Fumarate-FA (PRENATAL COMPLETE PO)  tiZANidine (ZANAFLEX) 2 MG tablet        Past Medical History:    Past Medical History:   Diagnosis Date     Allergic       Asthma      Chronic kidney disease      Depression      History of transfusion      Migraine      Trauma      Uncomplicated asthma      Varicella        Past Surgical History:    No past surgical history on file.     Family History:    family history includes Alcoholism in her brother and mother; Arthritis in her mother; Asthma in her brother and mother; Cerebrovascular Disease in her maternal grandfather; Depression in her mother; Diabetes in her maternal grandfather; Hearing Loss in her maternal grandfather; Heart Disease in her maternal grandfather; Hypertension in her maternal grandfather; Kidney Disease in her maternal grandfather; Mental Illness in her maternal grandfather and mother.    Social History:   reports that she has quit smoking. Her smoking use included cigarettes. She smoked an average of .25 packs per day. She has never used smokeless tobacco. She reports that she does not currently use alcohol after a past usage of about 1.0 standard drink per week. She reports current drug use. Drug: Marijuana.  [facility]     Physical Exam     Patient Vitals for the past 24 hrs:   BP Temp Temp src Pulse Resp SpO2   12/01/22 1458 113/65 -- -- -- -- --   12/01/22 1037 (!) 160/120 98.1  F (36.7  C) Temporal 99 24 99 %        Physical Exam  General: Adult female, anxious appearing, sitting upright  Eyes: PERRL, Conjunctive within normal limits  ENT: Moist mucous membranes, oropharynx clear.   CV: Normal S1S2, no murmur, rub or gallop. Regular rate and rhythm  Resp: Clear to auscultation bilaterally, no wheezes, rales or rhonchi. Normal respiratory effort.  GI: Abdomen is soft, nontender and nondistended. No palpable masses. No rebound or guarding.  MSK: No edema. Nontender. Normal active range of motion.  Skin: Warm and dry. No rashes or lesions or ecchymoses on visible skin.  Neuro: Alert and oriented. Responds appropriately to all questions and commands. No focal findings appreciated. Normal  muscle tone.  Psych: Teary.  Otherwise pleasant.    Emergency Department Course   ECG:  No results found for this or any previous visit.    Imaging:  US OB <14 Weeks W Transvaginal   Final Result   IMPRESSION:    1.  Twin living intrauterine gestation at 6 weeks 4 days EDC 7/23/2023   and 6 weeks 2 days EDC 7/25/2023.   2.  Small subchorionic hemorrhage.         TAYLOR GOODWIN MD            SYSTEM ID:  [id number]         Report per radiology    Laboratory:  Labs Ordered and Resulted from Time of ED Arrival to Time of ED Departure   INFLUENZA A/B & SARS-COV2 PCR MULTIPLEX - Abnormal       Result Value    Influenza A PCR Positive (*)     Influenza B PCR Negative      RSV PCR Negative      SARS CoV2 PCR Negative     BASIC METABOLIC PANEL - Abnormal    Sodium 137      Potassium 3.7      Chloride 99      Carbon Dioxide (CO2) 23      Anion Gap 15      Urea Nitrogen 4.6 (*)     Creatinine 0.51      Calcium 9.8      Glucose 81      GFR Estimate >90     HCG QUANTITATIVE PREGNANCY - Abnormal    hCG Quantitative 64,734 (*)    CBC WITH PLATELETS AND DIFFERENTIAL    WBC Count 8.3      RBC Count 4.31      Hemoglobin 13.6      Hematocrit 40.7      MCV 94      MCH 31.6      MCHC 33.4      RDW 12.0      Platelet Count 237      % Neutrophils 79      % Lymphocytes 10      % Monocytes 11      % Eosinophils 0      % Basophils 0      % Immature Granulocytes 0      NRBCs per 100 WBC 0      Absolute Neutrophils 6.5      Absolute Lymphocytes 0.8      Absolute Monocytes 0.9      Absolute Eosinophils 0.0      Absolute Basophils 0.0      Absolute Immature Granulocytes 0.0      Absolute NRBCs 0.0          Emergency Department Course:    Reviewed:  I reviewed nursing notes, vitals and past medical history    Assessments:   I obtained history and examined the patient as noted above and earlier in rapid assessment.    I rechecked the patient and explained findings, She reports she is overall feeling better. Still nauseous which she feels is  related to pregnancy.        Interventions:  Medications   acetaminophen (TYLENOL) tablet 1,000 mg (1,000 mg Oral Given 12/1/22 1400)   albuterol (PROVENTIL HFA/VENTOLIN HFA) inhaler (2 puffs Inhalation Given 12/1/22 1400)   0.9% sodium chloride BOLUS (0 mLs Intravenous Stopped 12/1/22 1459)   rho(D) immune globulin (RHOPHYLAC) injection 300 mcg (300 mcg Intravenous Given 12/1/22 1821)     Or   rho(D) immune globulin (RHOPHYLAC) injection 300 mcg ( Intramuscular See Alternative 12/1/22 1821)   ondansetron (ZOFRAN) injection 4 mg (4 mg Intravenous Given 12/1/22 1821)        Disposition:  The patient was discharged to home.     Impression & Plan      Medical Decision Making:  Kary Sebastian is a 32 year old female who presents for evaluation of cough, fever and myalgias.  They have other symptoms including pelvic cramping in the setting of early pregnancy without vaginal discharge or bleeding.   She is positive for influenza which is likely the cause of her symptoms.  Pelvic cramping is concerning the setting of early pregnancy for possible threatened miscarriage.  Ultrasound demonstrated viable twin gestation with a small subchorionic hemorrhage.  She is known to be Rh- and is given RhoGAM.  Her cramping was better on reassessment.  With regards to influenza, the patient is not out of treatment window for influenza and after discussing use of Tamiflu with the patient, it is prescribed.  They are at risk for pneumonia but no signs of this are detected on today's visit.  Close followup of primary care physician/OB/GYN is indicated and return to the ED for worsening of symptoms including increasing productive cough, shortness of breath, or confusion.  There is no signs of serious bacterial infection such as bacteremia, meningitis, UTI/pyelonephritis, strep pharyngitis, etc. she is feeling comfortable and improved on reassessment and is agreeable to plan for discharge home with expedited follow-up as above.  All questions  answered prior to discharge.    Diagnosis:    ICD-10-CM    1. Influenza A  J10.1       2. Nausea  R11.0       3. Pelvic cramping  R10.2       4. Twin gestation in first trimester, unspecified multiple gestation type  O30.001       5. Subchorionic hemorrhage of placenta in first trimester, single or unspecified fetus  O41.8X10     O46.8X1            Discharge Medications:  Discharge Medication List as of 12/1/2022  6:33 PM      START taking these medications    Details   oseltamivir (TAMIFLU) 75 MG capsule Take 1 capsule (75 mg) by mouth 2 times daily for 5 days, Disp-10 capsule, R-0, E-Prescribe         Zofran 4mg ODT q 8hours prn nausea/vomiting     12/1/2022   Lorrie Green MD Jonkman, Tracy Dianne, MD  12/02/22 1911

## 2022-12-01 NOTE — TELEPHONE ENCOUNTER
"Pt called CNM on call.  Reports she was feeling ill and somewhat short of breath and took an ambulance to the ER.  Currently at [facility].  States she was told it would be 4+ hours to be seen and is wondering if we can see her somewhere else sooner.  Considering leaving because she doesn't want to wait.  Approx 6 weeks pregnant with twins.  No bleeding currently, having some cramping.  Took a \"very hot\" bath earlier and is concerned.  Reviewed that based on her symptoms the ER would be the recommended location to be seen.  "

## 2022-12-01 NOTE — TELEPHONE ENCOUNTER
Called and left message regarding BOOK A TIGERhart message from pt who has many questions. Left message on voicemail and sent reply through [employer]

## 2022-12-01 NOTE — ED TRIAGE NOTES
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 Pt anxious about 6 yr daughter who is RSV positive and it affecting her pregnancy, pt is pregnant with twins, ~ 6.5 wks pregnant. Pt complaining of muscle cramping and finger tingling. Pt being talked through controlled breathing in triage and is able to slow down her breathing. Pt also concerned she \"cooked\" babies after taking a long hot bath last night to help with cramps, a friend told her that she shouldn't have done that     Triage Assessment       Row Name 12/01/22 1039       Triage Assessment (Adult)    Airway WDL WDL       Respiratory WDL    Respiratory WDL WDL       Skin Circulation/Temperature WDL    Skin Circulation/Temperature WDL WDL       Cardiac WDL    Cardiac WDL WDL       Peripheral/Neurovascular WDL    Peripheral Neurovascular WDL WDL       Cognitive/Neuro/Behavioral WDL    Cognitive/Neuro/Behavioral WDL WDL                  "

## 2022-12-01 NOTE — ED NOTES
ED Triage Provider Note  [facility] EMERGENCY DEPT  Encounter Date: Dec 1, 2022    History:  Cough, congestion, abdominal cramping, anxiety    Kary Sebastian is a 32 year old female G7,  approximately 6 weeks pregnant who presents to the ED with the above-stated concerns.  She noted her symptoms of cough and congestion as well as hot and cold flashes, nausea, pelvic cramping and body aches began last night.  Her daughter has influenza.  She notes she took a hot bath last night is concerned that she may have injured her fetus.  She has a cramping has persisted into today and she still feels all the other symptoms of presented to the emergency department.  She denies vaginal bleeding or leakage of fluid.  She denies concerns for STI.  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      Exam:  BP (!) 160/120   Pulse 99   Temp 98.1  F (36.7  C) (Temporal)   Resp 24   LMP 10/16/2022   SpO2 99%   General: No acute distress. Appears stated age.   Cardio: normal rate, extremities well perfused  Resp: Normal work of breathing, grossly normal respiratory rate.  Clear to auscultation bilaterally.  Abdomen: Soft, nondistended.  Mild generalized lower abdominal tenderness most prominent in left lower quadrant.  Neuro: Alert. Facial movement grossly symmetric. Grossly intact strength.       Medical Decision Making:  Patient arriving to the ED with problem as above. A medical screening exam was performed.   The patient understands that she is in the rapid assessment area.  She will be seen in evaluated fully by myself or another physician later in her stay.  She is in agreement to waiting until his full assessment.    Orders initiated from Triage. The patient is most appropriate to return to the waiting room.       Lorrie Green MD  2022 at 1:29 PM     Lorrie Green MD  [phone]

## 2022-12-01 NOTE — DISCHARGE INSTRUCTIONS
Discharge Instructions  Influenza    You were diagnosed today with influenza or influenza like illness.  Influenza is a respiratory (breathing) illness caused by influenza A or B viruses.  Influenza causes five primary symptoms: fever, headache, muscle aches/fatigue/malaise, sore throat and cough.  These symptoms start one to four days after you have been around a person with this illness. Influenza is spread through sneezing and coughing and can live on surfaces for several days.  It is usually contagious for 5 days but in some cases up to 10 days and often affects several family members. If you have a family member who is less than 2 years old, older than 65 years old, pregnant or has a serious medical condition, they should be seen right away by a provider to decide if they should take preventative medications. Although influenza will make you feel very ill, most patients don t require any specific treatment. An antiviral medication might be prescribed for certain groups of patients (older patients, younger patients, and those with certain chronic medical problems).    Generally, every Emergency Department visit should have a follow-up clinic visit with either a primary or a specialty clinic/provider. Please follow-up as instructed by your emergency provider today.    Return to the Emergency Department if:  You have trouble breathing.  You develop pain in your chest.  You have signs of being dehydrated, such as dizziness or unable to urinate (pee) at least three times daily.  You are confused or severely weak.  You cannot stop vomiting (throwing up) or you cannot drink enough fluids.    In children, you should seek help if the child has any of the above or if child:  Has blue or purplish skin color.  Is so irritable that he or she does not want to be held.  Does not have tears when crying (in infants) or does not urinate at least three times daily.  Does not wake up easily.    What can I do to help  myself?  Rest.  Fluids -- Drink hydrating solutions such as Gatorade  or Pedialyte  as often as you can. If you are drinking enough, you should pass urine at least every eight hours.  Tylenol  (acetaminophen) and Advil  (ibuprofen) can relieve fever, headache, and muscle aches. Do not give aspirin to children under 18 years old.   Antiviral treatment -- Antiviral medicines do not make the flu symptoms go away immediately.  They have only been shown to make the symptoms go away 12 to 24 hours sooner than they would without treatment.     Antibiotics -- Antibiotics are NOT useful for treating viral illnesses such as influenza. Antibiotics should only be used if there is a bacterial complication of the flu such as bacterial pneumonia, ear infection, or sinusitis.  Because you were diagnosed with a flu like illness you are very contagious.  This means you cannot work, attend school or  for at least 24 hours or until you no longer have a fever.  If you were given a prescription for medicine here today, be sure to read all of the information (including the package insert) that comes with your prescription.  This will include important information about the medicine, its side effects, and any warnings that you need to know about.  The pharmacist who fills the prescription can provide more information and answer questions you may have about the medicine.  If you have questions or concerns that the pharmacist cannot address, please call or return to the Emergency Department.   Remember that you can always come back to the Emergency Department if you are not able to see your regular provider in the amount of time listed above, if you get any new symptoms, or if there is anything that worries you.     You have a small subchorionic (placental) hemorrhage (bleeding). You have no vaginal bleeding at this time but need reassessment for the subchorionic hemorrhage. The babies have heartbeats detected on ultrasound today.

## 2022-12-02 ENCOUNTER — TELEPHONE (OUTPATIENT)
Dept: MIDWIFE SERVICES | Facility: CLINIC | Age: 32
End: 2022-12-02

## 2022-12-02 NOTE — TELEPHONE ENCOUNTER
"I reached Brynn by phone. She states she is feeling frustrated about \"what happened yesterday.\" \"Can't you look in my chart?\"    She then asks if I can just forward message to Karen \"because you don't know me and don't seem to know what's going on.\"    I informed Brynn that I'm the CNM on-call today, and that I am available to answer her questions.   She was seen in the ED yesterday for flu, and had an US which showed normal twin pregnancy with 'small subchorionic hemorrhage.'   She is having cramping - and is very concerned about \"hemorrhage\" seen on ultrasound.   Heavy lifting pn Wednesday, she moved some furniture - a dresser. And now she is worried that she caused the hemorrhage.    She is not having any vaginal bleeding. Endorses mild lower abdominal cramping that is intermittent. She is having GI symptoms, nausea, vomiting, diarrhea.     I reviewed US report and lab work with her over the phone. Provided reassurance of normalcy.   Recommended no moving furniture.    She has OBGYN appt at [facility] on 12/15. Recommend keeping that appt.   Bleeding precautions reviewed with patient, and when to call/contact CNM or clinic.     She verbalizes understanding, expresses appreciation for this information. All questions answered.     KWAKU Strauss, MAHAMED  [facility] Nurse-Midwifery  [facility]      12/2/2022 1:06 PM    "

## 2022-12-02 NOTE — TELEPHONE ENCOUNTER
Pt called CNM on call.  Tearful.  States she is at [facility] ER.  Was dx with influenza and also had an US and had a small subchorionic hemorrhage.  Worried about SAB.  Pt reports she is not having any bleeding currently, did have cramping/pain earlier which has since resolved.  Currently approx 6 weeks pregnant with twins.  Has appt with [facility] on 10/15.  Advised she call tomorrow AM to see if they would like to see her sooner.  Reviewed warning signs for SAB.  Pt states she will be given rx for tamiflu.  Reviewed symptomatic influenza treatment/comfort cares.

## 2022-12-12 ENCOUNTER — E-VISIT (OUTPATIENT)
Dept: URGENT CARE | Facility: CLINIC | Age: 32
End: 2022-12-12
Payer: COMMERCIAL

## 2022-12-12 ENCOUNTER — NURSE TRIAGE (OUTPATIENT)
Dept: NURSING | Facility: CLINIC | Age: 32
End: 2022-12-12

## 2022-12-12 DIAGNOSIS — Z13.89 SKIN CONDITION SCREENING: Primary | ICD-10-CM

## 2022-12-12 DIAGNOSIS — K30 ACID INDIGESTION: Primary | ICD-10-CM

## 2022-12-12 PROCEDURE — 99207 PR NON-BILLABLE SERV PER CHARTING: CPT | Performed by: PHYSICIAN ASSISTANT

## 2022-12-12 RX ORDER — SIMETHICONE 80 MG
80 TABLET,CHEWABLE ORAL EVERY 6 HOURS PRN
Qty: 30 TABLET | Refills: 0 | Status: ON HOLD | OUTPATIENT
Start: 2022-12-12 | End: 2023-05-23

## 2022-12-12 NOTE — PROGRESS NOTES
"Brynn is a  with twin interuterine pregnancy.  calls CNM with two concerns: First, pain in arm after blood draw last week.  States her arm is red from draw site at elbow, with reddness and pain running up and down her arm.  She is afebrile but is anxious about a possible infection.  Secondly, she is having indigestion pain in her upper abdomen, similar to what she had when she had her gallbladder removed.  Pain is sharp and intermittent.  She noted constipation this morning, followed by loose, mucousy stool.  She had recently eaten a tuna fish sandwich that \"did not agree with me\".  She has not tried any relief measures.  Rx for simethicone faxed to pharmacy for Brynn.  Also advised her that she needs to have her arm evaluated for a possible infection.  Advised that Brynn present to ER or urgent care for evaluation of arm.  Confirmed that Brynn has appointment scheduled with [facility] for ongoing pregnancy management.    KWAKU Marroquin, CNM  "

## 2022-12-12 NOTE — TELEPHONE ENCOUNTER
Nurse Triage SBAR  MIDWIVES      Is this a 2nd Level Triage? NO    Situation:   Spoke with 32 yr old Brynn who c/o:  8 weeks and 1 day pregnant with twins.  Pt is having intermittent lower abdominal cramping and concerned due to hx of miscarriages.  Pt also having swelling and pain  in the forearm since having an IV blood draw at the ED 12/1/22.        Background:   Hx of miscarriages x 3.  ER at [facility] December 1st  IV for lab draw.  Hx of gallbladder removal.  RH negative.  Assessment:   Needs Midwife triage  Protocol Recommended Disposition:   No disposition on file.    Recommendation:  Warm transferred patient to  Midwives paging and triage line/clinic hours.    [place] RN  [facility]

## 2022-12-12 NOTE — PATIENT INSTRUCTIONS
Dear Kary Sebastian?     After reviewing your responses, I am unable to make a diagnosis that can be treated online.    You will not be charged for this eVisit.     We are dedicated to helping you achieve your best health and would like to see you in one of our many clinic locations - a primary care provider would be ideal for your concern.    Please use [facility] to schedule a visit with a provider or call [phone]) to schedule at any of our locations.    Thanks for choosing?us?as your health care partner,?   ?   Jayshree Retana PA-C, PA-C?

## 2022-12-15 ENCOUNTER — HOSPITAL ENCOUNTER (EMERGENCY)
Facility: CLINIC | Age: 32
Discharge: HOME | End: 2022-12-15
Attending: EMERGENCY MEDICINE | Admitting: EMERGENCY MEDICINE
Payer: COMMERCIAL

## 2022-12-15 ENCOUNTER — APPOINTMENT (OUTPATIENT)
Dept: ULTRASOUND IMAGING | Facility: CLINIC | Age: 32
End: 2022-12-15
Attending: EMERGENCY MEDICINE
Payer: COMMERCIAL

## 2022-12-15 ENCOUNTER — APPOINTMENT (OUTPATIENT)
Dept: MRI IMAGING | Facility: CLINIC | Age: 32
End: 2022-12-15
Attending: EMERGENCY MEDICINE
Payer: COMMERCIAL

## 2022-12-15 ENCOUNTER — APPOINTMENT (OUTPATIENT)
Dept: RADIOLOGY | Facility: CLINIC | Age: 32
End: 2022-12-15
Attending: EMERGENCY MEDICINE
Payer: COMMERCIAL

## 2022-12-15 VITALS
SYSTOLIC BLOOD PRESSURE: 113 MMHG | OXYGEN SATURATION: 99 % | RESPIRATION RATE: 15 BRPM | HEART RATE: 63 BPM | WEIGHT: 137.6 LBS | DIASTOLIC BLOOD PRESSURE: 82 MMHG | BODY MASS INDEX: 22.72 KG/M2 | TEMPERATURE: 98.4 F

## 2022-12-15 DIAGNOSIS — R10.13 ABDOMINAL PAIN, EPIGASTRIC: ICD-10-CM

## 2022-12-15 DIAGNOSIS — R10.32 ABDOMINAL PAIN, LEFT LOWER QUADRANT: ICD-10-CM

## 2022-12-15 DIAGNOSIS — O30.001 TWIN GESTATION IN FIRST TRIMESTER, UNSPECIFIED MULTIPLE GESTATION TYPE: ICD-10-CM

## 2022-12-15 LAB
ALBUMIN SERPL-MCNC: 3.5 G/DL (ref 3.5–5)
ALP SERPL-CCNC: 45 U/L (ref 45–120)
ALT SERPL W P-5'-P-CCNC: 15 U/L (ref 0–45)
ANION GAP SERPL CALCULATED.3IONS-SCNC: 5 MMOL/L (ref 5–18)
AST SERPL W P-5'-P-CCNC: 16 U/L (ref 0–40)
BASOPHILS # BLD AUTO: 0 10E3/UL (ref 0–0.2)
BASOPHILS NFR BLD AUTO: 0 %
BILIRUB SERPL-MCNC: 0.4 MG/DL (ref 0–1)
BUN SERPL-MCNC: 3 MG/DL (ref 8–22)
CALCIUM SERPL-MCNC: 9 MG/DL (ref 8.5–10.5)
CHLORIDE BLD-SCNC: 108 MMOL/L (ref 98–107)
CO2 SERPL-SCNC: 22 MMOL/L (ref 22–31)
CREAT SERPL-MCNC: 0.59 MG/DL (ref 0.6–1.1)
EOSINOPHIL # BLD AUTO: 0.1 10E3/UL (ref 0–0.7)
EOSINOPHIL NFR BLD AUTO: 1 %
ERYTHROCYTE [DISTWIDTH] IN BLOOD BY AUTOMATED COUNT: 11.9 % (ref 10–15)
GFR SERPL CREATININE-BSD FRML MDRD: >90 ML/MIN/1.73M2
GLUCOSE BLD-MCNC: 94 MG/DL (ref 70–125)
HCT VFR BLD AUTO: 34.1 % (ref 35–47)
HGB BLD-MCNC: 11.9 G/DL (ref 11.7–15.7)
IMM GRANULOCYTES # BLD: 0 10E3/UL
IMM GRANULOCYTES NFR BLD: 0 %
LIPASE SERPL-CCNC: 40 U/L (ref 0–52)
LYMPHOCYTES # BLD AUTO: 1.4 10E3/UL (ref 0.8–5.3)
LYMPHOCYTES NFR BLD AUTO: 14 %
MCH RBC QN AUTO: 31.6 PG (ref 26.5–33)
MCHC RBC AUTO-ENTMCNC: 34.9 G/DL (ref 31.5–36.5)
MCV RBC AUTO: 91 FL (ref 78–100)
MONOCYTES # BLD AUTO: 0.7 10E3/UL (ref 0–1.3)
MONOCYTES NFR BLD AUTO: 7 %
NEUTROPHILS # BLD AUTO: 7.8 10E3/UL (ref 1.6–8.3)
NEUTROPHILS NFR BLD AUTO: 78 %
NRBC # BLD AUTO: 0 10E3/UL
NRBC BLD AUTO-RTO: 0 /100
PLATELET # BLD AUTO: 245 10E3/UL (ref 150–450)
POTASSIUM BLD-SCNC: 3.4 MMOL/L (ref 3.5–5)
PROT SERPL-MCNC: 6.2 G/DL (ref 6–8)
RBC # BLD AUTO: 3.77 10E6/UL (ref 3.8–5.2)
SODIUM SERPL-SCNC: 135 MMOL/L (ref 136–145)
WBC # BLD AUTO: 10 10E3/UL (ref 4–11)

## 2022-12-15 PROCEDURE — 96361 HYDRATE IV INFUSION ADD-ON: CPT

## 2022-12-15 PROCEDURE — 74181 MRI ABDOMEN W/O CONTRAST: CPT

## 2022-12-15 PROCEDURE — 80053 COMPREHEN METABOLIC PANEL: CPT | Performed by: EMERGENCY MEDICINE

## 2022-12-15 PROCEDURE — 85025 COMPLETE CBC W/AUTO DIFF WBC: CPT | Performed by: EMERGENCY MEDICINE

## 2022-12-15 PROCEDURE — 99285 EMERGENCY DEPT VISIT HI MDM: CPT | Mod: 25

## 2022-12-15 PROCEDURE — 76801 OB US < 14 WKS SINGLE FETUS: CPT

## 2022-12-15 PROCEDURE — 250N000013 HC RX MED GY IP 250 OP 250 PS 637: Performed by: EMERGENCY MEDICINE

## 2022-12-15 PROCEDURE — 71046 X-RAY EXAM CHEST 2 VIEWS: CPT

## 2022-12-15 PROCEDURE — 83690 ASSAY OF LIPASE: CPT | Performed by: EMERGENCY MEDICINE

## 2022-12-15 PROCEDURE — 258N000003 HC RX IP 258 OP 636: Performed by: EMERGENCY MEDICINE

## 2022-12-15 PROCEDURE — 36415 COLL VENOUS BLD VENIPUNCTURE: CPT | Performed by: EMERGENCY MEDICINE

## 2022-12-15 PROCEDURE — 96360 HYDRATION IV INFUSION INIT: CPT

## 2022-12-15 RX ORDER — HYDROXYZINE HYDROCHLORIDE 25 MG/1
50 TABLET, FILM COATED ORAL
Status: DISCONTINUED | OUTPATIENT
Start: 2022-12-15 | End: 2022-12-15 | Stop reason: HOSPADM

## 2022-12-15 RX ORDER — ONDANSETRON 4 MG/1
4 TABLET, ORALLY DISINTEGRATING ORAL EVERY 8 HOURS PRN
Qty: 30 TABLET | Refills: 0 | Status: ON HOLD | OUTPATIENT
Start: 2022-12-15 | End: 2023-04-27

## 2022-12-15 RX ORDER — ACETAMINOPHEN 325 MG/1
650 TABLET ORAL ONCE
Status: COMPLETED | OUTPATIENT
Start: 2022-12-15 | End: 2022-12-15

## 2022-12-15 RX ORDER — HYDROXYZINE HYDROCHLORIDE 25 MG/1
25 TABLET, FILM COATED ORAL 3 TIMES DAILY PRN
Status: DISCONTINUED | OUTPATIENT
Start: 2022-12-15 | End: 2022-12-15 | Stop reason: HOSPADM

## 2022-12-15 RX ADMIN — SODIUM CHLORIDE 1000 ML: 9 INJECTION, SOLUTION INTRAVENOUS at 05:57

## 2022-12-15 RX ADMIN — ACETAMINOPHEN 650 MG: 325 TABLET ORAL at 10:15

## 2022-12-15 ASSESSMENT — ENCOUNTER SYMPTOMS
DIARRHEA: 1
VOMITING: 1
FEVER: 0
BACK PAIN: 1
ABDOMINAL PAIN: 1

## 2022-12-15 ASSESSMENT — ACTIVITIES OF DAILY LIVING (ADL)
ADLS_ACUITY_SCORE: 35

## 2022-12-15 NOTE — ED TRIAGE NOTES
pt is 8 weeks 4 days pregnant. Is having abd pain, pelvic pain. Was seen at [facility] and left AMA d/t not being treated well. States is carrying twins. Has diarrhea. Was on a clear liquid diet. States stool sample was collected, so to was blood. Is concerned for pancreatitis.

## 2022-12-15 NOTE — ED NOTES
"EMERGENCY DEPARTMENT SIGN OUT NOTE        ED COURSE AND MEDICAL DECISION MAKING  Patient was signed out to me by Dr Hiwot Hinojosa at 6:57 AM.   7:20 AM I introduced myself to the patient and rechecked her.   8:34 AM I spoke to [facility].   12:05 PM I rechecked and updated the patient. We discussed the plan for discharge and the patient is agreeable. Reviewed supportive cares, symptomatic treatment, outpatient follow up, and reasons to return to the Emergency Department. Patient to be discharged by ED RN.     In brief, Kary Sebastian is a 32 year old female who initially presented for the evaluation of abdominal pain.     Patient, who is  at 8 weeks gestation, reports that three days ago, she developed some epigastric abdominal pain after eating some food, which she described as squeezing. Shortly after, she developed some dark colored watery diarrhea. Patient went to the [facility] at that time and was told her \"pancreas was swollen\" and was started on a clear liquid diet. That night after she was discharged, she woke up with epigastric pain and vomited, which was reminiscent to her pain prior to her cholecystectomy. She then went to [facility] for evaluation. This morning, patient developed some left lower quadrant abdominal pain that started around 0300 that was worse with walking. She brought her concerns up with hospital staff but then requested discharge as she felt as if she was not being heard, then presented to [facility] Emergency Department, where her OB resides.    At time of sign out, disposition was pending imaging.     ED Course as of 12/15/22 1209   Thu Dec 15, 2022   0535 Afebrile.  Vital signs here unremarkable.  Patient is coming to the emergency department today for evaluation of upper abdominal pain.  She is a G7,  at approximately 8 weeks and 4 days with twin pregnancy coming in with pain in her upper abdomen.  Started earlier this week on the 12th, initially " "went to urgent care, was diagnosed with elevated lipase, likely pancreatitis.  Went to [facility], was admitted for the last several days.  She states they have been telling her that her pancreas \"looks good\" and has been keeping her on fluids.  She has been having diarrhea and today has been having worsening pain in the left lower quadrant.  She did leave the hospital AMA this evening because she felt like she was not being listened to.  She was quite concerned about the pain in her left lower quadrant and the continued pain in her epigastric region.  She does have a history of miscarriage and has had miscarriage x3 in the past and is quite concerned about any possible injury to her pregnancy.   0554 Physical exam for patient here with mild epigastric tenderness to palpation.  No significant right upper quadrant tenderness.  She is reporting left lower quadrant pain but she does not have any increased pain on palpation.  No CVA tenderness.  No other lower abdominal tenderness to palpation.   0554 Patient's blood work back here fairly unremarkable with normal lipase, no elevation of her LFTs either.  Currently is over getting a ultrasound pelvic, given his continued pain, diarrhea, now with his left lower quadrant pain will proceed with MRI abdomen.  [facility] these [facility] studies and disposition.   0723 Patient requesting something for anxiety related to MRI claustrophobia.  Will give dose of hydroxyzine since pregnant   0840 Plan for MRI without contrast.  Discussed with radiology technician who will talk to the body radiologist about protocol without contrast   0842 Spoke with OB Dr. Arnett who will have patient  contact patient to reschedule urgently since patient will miss her appointment at 9 AM   1208 MRI without evidence of appendicitis or pancreatitis or any acute findings besides having twins.   1209 She is aware of the subchorionic hemorrhage.   1209 " Tolerating oral intake.  Pain well controlled.  Plan for discharge and close follow-up will be   1209 Needed chest x-ray for MRI clearance         FINAL IMPRESSION    1. Abdominal pain, epigastric    2. Abdominal pain, left lower quadrant    3. Twin gestation in first trimester, unspecified multiple gestation type        ED MEDS  Medications   hydrOXYzine (ATARAX) tablet 25 mg (has no administration in time range)     Or   hydrOXYzine (ATARAX) tablet 50 mg (has no administration in time range)   0.9% sodium chloride BOLUS (0 mLs Intravenous Stopped 12/15/22 0863)   acetaminophen (TYLENOL) tablet 650 mg (650 mg Oral Given 12/15/22 1015)       LAB  Labs Ordered and Resulted from Time of ED Arrival to Time of ED Departure   COMPREHENSIVE METABOLIC PANEL - Abnormal       Result Value    Sodium 135 (*)     Potassium 3.4 (*)     Chloride 108 (*)     Carbon Dioxide (CO2) 22      Anion Gap 5      Urea Nitrogen 3 (*)     Creatinine 0.59 (*)     Calcium 9.0      Glucose 94      Alkaline Phosphatase 45      AST 16      ALT 15      Protein Total 6.2      Albumin 3.5      Bilirubin Total 0.4      GFR Estimate >90     CBC WITH PLATELETS AND DIFFERENTIAL - Abnormal    WBC Count 10.0      RBC Count 3.77 (*)     Hemoglobin 11.9      Hematocrit 34.1 (*)     MCV 91      MCH 31.6      MCHC 34.9      RDW 11.9      Platelet Count 245      % Neutrophils 78      % Lymphocytes 14      % Monocytes 7      % Eosinophils 1      % Basophils 0      % Immature Granulocytes 0      NRBCs per 100 WBC 0      Absolute Neutrophils 7.8      Absolute Lymphocytes 1.4      Absolute Monocytes 0.7      Absolute Eosinophils 0.1      Absolute Basophils 0.0      Absolute Immature Granulocytes 0.0      Absolute NRBCs 0.0     LIPASE - Normal    Lipase 40         RADIOLOGY    MR Abdomen w/o Contrast   Final Result   IMPRESSION:   1.  No abdominal/pelvic pain etiology found.      XR Chest 2 Views   Final Result   IMPRESSION:       Negative chest. No radiopaque  foreign body detected in the chest. Surgical clips in the right upper quadrant typical for prior cholecystectomy.      OB  US 1st trimester w transvag   Final Result   IMPRESSION:     1.  Twin dichorionic diamniotic gestation.   2.  Twin A 8 weeks 6 days with EDC 07/21/2023.   3.  Twin B 8 weeks 6 days with EDC 07/21/2023.   4.  Normal interval growth.   5.  No pain etiology found.          DISCHARGE MEDS  New Prescriptions    ONDANSETRON (ZOFRAN ODT) 4 MG ODT TAB    Take 1 tablet (4 mg) by mouth every 8 hours as needed for nausea     I, Tiffany Lopez, am serving as a scribe to document services personally performed by Dr. Go based on my observation and the provider's statements to me. I, Julio Go MD attest that Tiffany Lopez is acting in a scribe capacity, has observed my performance of the services and has documented them in accordance with my direction.    Julio Go MD  Emergency Medicine  [facility] EMERGENCY ROOM  [facility] [address] [phone]     Julio Go MD  12/15/22 1875

## 2022-12-15 NOTE — DISCHARGE INSTRUCTIONS
Your MRI does not show evidence of appendicitis or problems with your pancreas or problems your intestines.  Likely your vomiting was secondary to your high pregnancy levels secondary to twins.  Recommend Zofran every 8 hours as needed.  Follow-up with your OB doctor.  Return to the ER for worsening symptoms
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
## 2022-12-15 NOTE — ED NOTES
Pt has lower left abd pain, and epi gastric pain. Pt describes epi gastic pain as sharp, squeezing and intermittent.

## 2022-12-16 ENCOUNTER — LAB REQUISITION (OUTPATIENT)
Dept: LAB | Facility: CLINIC | Age: 32
End: 2022-12-16

## 2022-12-16 ENCOUNTER — LAB REQUISITION (OUTPATIENT)
Dept: LAB | Facility: CLINIC | Age: 32
End: 2022-12-16
Payer: COMMERCIAL

## 2022-12-16 ENCOUNTER — TRANSFERRED RECORDS (OUTPATIENT)
Dept: HEALTH INFORMATION MANAGEMENT | Facility: CLINIC | Age: 32
End: 2022-12-16

## 2022-12-16 DIAGNOSIS — Z34.91 ENCOUNTER FOR SUPERVISION OF NORMAL PREGNANCY, UNSPECIFIED, FIRST TRIMESTER: ICD-10-CM

## 2022-12-16 DIAGNOSIS — R03.0 ELEVATED BLOOD-PRESSURE READING, WITHOUT DIAGNOSIS OF HYPERTENSION: ICD-10-CM

## 2022-12-16 LAB
ALBUMIN MFR UR ELPH: 14.7 MG/DL
ALBUMIN SERPL BCG-MCNC: 4 G/DL (ref 3.5–5.2)
ALP SERPL-CCNC: 56 U/L (ref 35–104)
ALT SERPL W P-5'-P-CCNC: 35 U/L (ref 10–35)
ANION GAP SERPL CALCULATED.3IONS-SCNC: 12 MMOL/L (ref 7–15)
AST SERPL W P-5'-P-CCNC: 26 U/L (ref 10–35)
BASOPHILS # BLD AUTO: 0 10E3/UL (ref 0–0.2)
BASOPHILS NFR BLD AUTO: 0 %
BILIRUB SERPL-MCNC: <0.2 MG/DL
BUN SERPL-MCNC: 11.3 MG/DL (ref 6–20)
CALCIUM SERPL-MCNC: 9.2 MG/DL (ref 8.6–10)
CHLORIDE SERPL-SCNC: 102 MMOL/L (ref 98–107)
CREAT SERPL-MCNC: 0.59 MG/DL (ref 0.51–0.95)
CREAT UR-MCNC: 151 MG/DL
DEPRECATED HCO3 PLAS-SCNC: 22 MMOL/L (ref 22–29)
EOSINOPHIL # BLD AUTO: 0.1 10E3/UL (ref 0–0.7)
EOSINOPHIL NFR BLD AUTO: 1 %
ERYTHROCYTE [DISTWIDTH] IN BLOOD BY AUTOMATED COUNT: 12.1 % (ref 10–15)
GFR SERPL CREATININE-BSD FRML MDRD: >90 ML/MIN/1.73M2
GLUCOSE SERPL-MCNC: 76 MG/DL (ref 70–99)
HCT VFR BLD AUTO: 35.7 % (ref 35–47)
HGB BLD-MCNC: 11.9 G/DL (ref 11.7–15.7)
IMM GRANULOCYTES # BLD: 0 10E3/UL
IMM GRANULOCYTES NFR BLD: 0 %
LYMPHOCYTES # BLD AUTO: 1.8 10E3/UL (ref 0.8–5.3)
LYMPHOCYTES NFR BLD AUTO: 16 %
MCH RBC QN AUTO: 31.3 PG (ref 26.5–33)
MCHC RBC AUTO-ENTMCNC: 33.3 G/DL (ref 31.5–36.5)
MCV RBC AUTO: 94 FL (ref 78–100)
MONOCYTES # BLD AUTO: 0.7 10E3/UL (ref 0–1.3)
MONOCYTES NFR BLD AUTO: 6 %
NEUTROPHILS # BLD AUTO: 8.7 10E3/UL (ref 1.6–8.3)
NEUTROPHILS NFR BLD AUTO: 77 %
NRBC # BLD AUTO: 0 10E3/UL
NRBC BLD AUTO-RTO: 0 /100
PLATELET # BLD AUTO: 248 10E3/UL (ref 150–450)
POTASSIUM SERPL-SCNC: 3.6 MMOL/L (ref 3.4–5.3)
PROT SERPL-MCNC: 6.4 G/DL (ref 6.4–8.3)
PROT/CREAT 24H UR: 0.1 MG/MG CR (ref 0–0.2)
RBC # BLD AUTO: 3.8 10E6/UL (ref 3.8–5.2)
SODIUM SERPL-SCNC: 136 MMOL/L (ref 136–145)
WBC # BLD AUTO: 11.4 10E3/UL (ref 4–11)

## 2022-12-16 PROCEDURE — 84156 ASSAY OF PROTEIN URINE: CPT | Mod: ORL | Performed by: PHYSICIAN ASSISTANT

## 2022-12-16 PROCEDURE — 86870 RBC ANTIBODY IDENTIFICATION: CPT | Performed by: PHYSICIAN ASSISTANT

## 2022-12-16 PROCEDURE — 86747 PARVOVIRUS ANTIBODY: CPT | Mod: ORL,59 | Performed by: PHYSICIAN ASSISTANT

## 2022-12-16 PROCEDURE — 87086 URINE CULTURE/COLONY COUNT: CPT | Mod: ORL | Performed by: PHYSICIAN ASSISTANT

## 2022-12-16 PROCEDURE — 85025 COMPLETE CBC W/AUTO DIFF WBC: CPT | Mod: ORL | Performed by: PHYSICIAN ASSISTANT

## 2022-12-16 PROCEDURE — 86762 RUBELLA ANTIBODY: CPT | Mod: ORL | Performed by: PHYSICIAN ASSISTANT

## 2022-12-16 PROCEDURE — 80053 COMPREHEN METABOLIC PANEL: CPT | Mod: ORL | Performed by: PHYSICIAN ASSISTANT

## 2022-12-16 PROCEDURE — 87389 HIV-1 AG W/HIV-1&-2 AB AG IA: CPT | Mod: ORL | Performed by: PHYSICIAN ASSISTANT

## 2022-12-16 PROCEDURE — 86780 TREPONEMA PALLIDUM: CPT | Mod: ORL | Performed by: PHYSICIAN ASSISTANT

## 2022-12-16 PROCEDURE — 86803 HEPATITIS C AB TEST: CPT | Mod: ORL | Performed by: PHYSICIAN ASSISTANT

## 2022-12-16 PROCEDURE — 86901 BLOOD TYPING SEROLOGIC RH(D): CPT | Mod: ORL | Performed by: PHYSICIAN ASSISTANT

## 2022-12-16 PROCEDURE — 86850 RBC ANTIBODY SCREEN: CPT | Mod: ORL | Performed by: PHYSICIAN ASSISTANT

## 2022-12-16 PROCEDURE — 87340 HEPATITIS B SURFACE AG IA: CPT | Mod: ORL | Performed by: PHYSICIAN ASSISTANT

## 2022-12-17 ENCOUNTER — HOSPITAL ENCOUNTER (EMERGENCY)
Facility: CLINIC | Age: 32
Discharge: HOME | End: 2022-12-17
Admitting: STUDENT IN AN ORGANIZED HEALTH CARE EDUCATION/TRAINING PROGRAM
Payer: COMMERCIAL

## 2022-12-17 VITALS
HEART RATE: 102 BPM | OXYGEN SATURATION: 98 % | DIASTOLIC BLOOD PRESSURE: 82 MMHG | RESPIRATION RATE: 22 BRPM | WEIGHT: 137 LBS | SYSTOLIC BLOOD PRESSURE: 133 MMHG | BODY MASS INDEX: 22.02 KG/M2 | HEIGHT: 66 IN | TEMPERATURE: 98.1 F

## 2022-12-17 LAB
ABO/RH(D): ABNORMAL
ANTIBODY SCREEN: POSITIVE
ATRIAL RATE - MUSE: 89 BPM
DIASTOLIC BLOOD PRESSURE - MUSE: NORMAL MMHG
INTERPRETATION ECG - MUSE: NORMAL
P AXIS - MUSE: 74 DEGREES
PR INTERVAL - MUSE: 142 MS
QRS DURATION - MUSE: 82 MS
QT - MUSE: 362 MS
QTC - MUSE: 440 MS
R AXIS - MUSE: 62 DEGREES
SPECIMEN EXPIRATION DATE: ABNORMAL
SYSTOLIC BLOOD PRESSURE - MUSE: NORMAL MMHG
T AXIS - MUSE: 52 DEGREES
T PALLIDUM AB SER QL: NONREACTIVE
VENTRICULAR RATE- MUSE: 89 BPM

## 2022-12-17 PROCEDURE — 999N000104 HC STATISTIC NO CHARGE

## 2022-12-17 PROCEDURE — 93005 ELECTROCARDIOGRAM TRACING: CPT | Performed by: EMERGENCY MEDICINE

## 2022-12-17 NOTE — ED TRIAGE NOTES
Patient has had hypertension all day, having chest pain and headache.      Triage Assessment     Row Name 12/17/22 0030       Triage Assessment (Adult)    Airway WDL WDL       Respiratory WDL    Respiratory WDL WDL       Skin Circulation/Temperature WDL    Skin Circulation/Temperature WDL WDL       Cardiac WDL    Cardiac WDL WDL       Peripheral/Neurovascular WDL    Peripheral Neurovascular WDL WDL       Cognitive/Neuro/Behavioral WDL    Cognitive/Neuro/Behavioral WDL WDL

## 2022-12-18 LAB
ANTIBODY ID: NORMAL
BACTERIA UR CULT: NO GROWTH
SPECIMEN EXPIRATION DATE: NORMAL

## 2022-12-19 LAB
HCV AB SERPL QL IA: NONREACTIVE
RUBV IGG SERPL QL IA: 0.93 INDEX
RUBV IGG SERPL QL IA: NORMAL

## 2022-12-20 LAB
HBV SURFACE AG SERPL QL IA: NONREACTIVE
HIV 1+2 AB+HIV1 P24 AG SERPL QL IA: NONREACTIVE

## 2022-12-22 LAB
B19V IGG SER IA-ACNC: 7.63 IV
B19V IGM SER IA-ACNC: 0.35 IV

## 2022-12-30 ENCOUNTER — LAB REQUISITION (OUTPATIENT)
Dept: LAB | Facility: CLINIC | Age: 32
End: 2022-12-30

## 2022-12-30 DIAGNOSIS — O99.019 ANEMIA COMPLICATING PREGNANCY, UNSPECIFIED TRIMESTER: ICD-10-CM

## 2022-12-30 LAB
BASOPHILS # BLD AUTO: 0 10E3/UL (ref 0–0.2)
BASOPHILS NFR BLD AUTO: 0 %
EOSINOPHIL # BLD AUTO: 0.1 10E3/UL (ref 0–0.7)
EOSINOPHIL NFR BLD AUTO: 0 %
ERYTHROCYTE [DISTWIDTH] IN BLOOD BY AUTOMATED COUNT: 13 % (ref 10–15)
FERRITIN SERPL-MCNC: 27 NG/ML (ref 6–175)
HCT VFR BLD AUTO: 36.9 % (ref 35–47)
HGB BLD-MCNC: 12.3 G/DL (ref 11.7–15.7)
IMM GRANULOCYTES # BLD: 0.1 10E3/UL
IMM GRANULOCYTES NFR BLD: 0 %
LYMPHOCYTES # BLD AUTO: 1.7 10E3/UL (ref 0.8–5.3)
LYMPHOCYTES NFR BLD AUTO: 15 %
MCH RBC QN AUTO: 31.1 PG (ref 26.5–33)
MCHC RBC AUTO-ENTMCNC: 33.3 G/DL (ref 31.5–36.5)
MCV RBC AUTO: 93 FL (ref 78–100)
MONOCYTES # BLD AUTO: 0.6 10E3/UL (ref 0–1.3)
MONOCYTES NFR BLD AUTO: 5 %
NEUTROPHILS # BLD AUTO: 9 10E3/UL (ref 1.6–8.3)
NEUTROPHILS NFR BLD AUTO: 80 %
NRBC # BLD AUTO: 0 10E3/UL
NRBC BLD AUTO-RTO: 0 /100
PLATELET # BLD AUTO: 308 10E3/UL (ref 150–450)
RBC # BLD AUTO: 3.96 10E6/UL (ref 3.8–5.2)
WBC # BLD AUTO: 11.3 10E3/UL (ref 4–11)

## 2022-12-30 PROCEDURE — 82728 ASSAY OF FERRITIN: CPT | Performed by: OBSTETRICS & GYNECOLOGY

## 2022-12-30 PROCEDURE — 85025 COMPLETE CBC W/AUTO DIFF WBC: CPT | Performed by: OBSTETRICS & GYNECOLOGY

## 2023-01-06 ENCOUNTER — HOSPITAL ENCOUNTER (OUTPATIENT)
Dept: ULTRASOUND IMAGING | Facility: CLINIC | Age: 33
Discharge: HOME | End: 2023-01-06
Attending: OBSTETRICS & GYNECOLOGY | Admitting: OBSTETRICS & GYNECOLOGY
Payer: COMMERCIAL

## 2023-01-06 DIAGNOSIS — M79.601 PAIN OF RIGHT ARM: ICD-10-CM

## 2023-01-06 PROCEDURE — 93971 EXTREMITY STUDY: CPT | Mod: RT

## 2023-01-20 ENCOUNTER — LAB REQUISITION (OUTPATIENT)
Dept: LAB | Facility: CLINIC | Age: 33
End: 2023-01-20

## 2023-01-20 DIAGNOSIS — O99.019 ANEMIA COMPLICATING PREGNANCY, UNSPECIFIED TRIMESTER: ICD-10-CM

## 2023-01-20 DIAGNOSIS — Z11.3 ENCOUNTER FOR SCREENING FOR INFECTIONS WITH A PREDOMINANTLY SEXUAL MODE OF TRANSMISSION: ICD-10-CM

## 2023-01-20 LAB
ERYTHROCYTE [DISTWIDTH] IN BLOOD BY AUTOMATED COUNT: 13.7 % (ref 10–15)
FERRITIN SERPL-MCNC: 23 NG/ML (ref 6–175)
HCT VFR BLD AUTO: 34.7 % (ref 35–47)
HGB BLD-MCNC: 11.6 G/DL (ref 11.7–15.7)
MCH RBC QN AUTO: 31.7 PG (ref 26.5–33)
MCHC RBC AUTO-ENTMCNC: 33.4 G/DL (ref 31.5–36.5)
MCV RBC AUTO: 95 FL (ref 78–100)
PLATELET # BLD AUTO: 259 10E3/UL (ref 150–450)
RBC # BLD AUTO: 3.66 10E6/UL (ref 3.8–5.2)
WBC # BLD AUTO: 9 10E3/UL (ref 4–11)

## 2023-01-20 PROCEDURE — 85027 COMPLETE CBC AUTOMATED: CPT | Performed by: OBSTETRICS & GYNECOLOGY

## 2023-01-20 PROCEDURE — 87389 HIV-1 AG W/HIV-1&-2 AB AG IA: CPT | Performed by: OBSTETRICS & GYNECOLOGY

## 2023-01-20 PROCEDURE — 86780 TREPONEMA PALLIDUM: CPT | Performed by: OBSTETRICS & GYNECOLOGY

## 2023-01-20 PROCEDURE — 86803 HEPATITIS C AB TEST: CPT | Performed by: OBSTETRICS & GYNECOLOGY

## 2023-01-20 PROCEDURE — 87340 HEPATITIS B SURFACE AG IA: CPT | Performed by: OBSTETRICS & GYNECOLOGY

## 2023-01-20 PROCEDURE — 82728 ASSAY OF FERRITIN: CPT | Performed by: OBSTETRICS & GYNECOLOGY

## 2023-01-20 PROCEDURE — 87491 CHLMYD TRACH DNA AMP PROBE: CPT | Performed by: OBSTETRICS & GYNECOLOGY

## 2023-01-21 LAB
C TRACH DNA SPEC QL PROBE+SIG AMP: NEGATIVE
N GONORRHOEA DNA SPEC QL NAA+PROBE: NEGATIVE
T PALLIDUM AB SER QL: NONREACTIVE

## 2023-01-22 ENCOUNTER — HEALTH MAINTENANCE LETTER (OUTPATIENT)
Age: 33
End: 2023-01-22

## 2023-01-22 LAB
HBV SURFACE AG SERPL QL IA: NONREACTIVE
HCV AB SERPL QL IA: NONREACTIVE
HIV 1+2 AB+HIV1 P24 AG SERPL QL IA: NONREACTIVE

## 2023-02-06 ENCOUNTER — LAB REQUISITION (OUTPATIENT)
Dept: LAB | Facility: CLINIC | Age: 33
End: 2023-02-06

## 2023-02-06 DIAGNOSIS — O99.019 ANEMIA COMPLICATING PREGNANCY, UNSPECIFIED TRIMESTER: ICD-10-CM

## 2023-02-06 PROCEDURE — 85027 COMPLETE CBC AUTOMATED: CPT | Performed by: OBSTETRICS & GYNECOLOGY

## 2023-02-06 PROCEDURE — 82728 ASSAY OF FERRITIN: CPT | Performed by: OBSTETRICS & GYNECOLOGY

## 2023-02-07 DIAGNOSIS — O99.012 ANEMIA OF PREGNANCY IN SECOND TRIMESTER: Primary | ICD-10-CM

## 2023-02-07 LAB
ERYTHROCYTE [DISTWIDTH] IN BLOOD BY AUTOMATED COUNT: 14 % (ref 10–15)
FERRITIN SERPL-MCNC: 14 NG/ML (ref 6–175)
HCT VFR BLD AUTO: 35 % (ref 35–47)
HGB BLD-MCNC: 11.6 G/DL (ref 11.7–15.7)
MCH RBC QN AUTO: 31.9 PG (ref 26.5–33)
MCHC RBC AUTO-ENTMCNC: 33.1 G/DL (ref 31.5–36.5)
MCV RBC AUTO: 96 FL (ref 78–100)
PLATELET # BLD AUTO: 261 10E3/UL (ref 150–450)
RBC # BLD AUTO: 3.64 10E6/UL (ref 3.8–5.2)
WBC # BLD AUTO: 10.6 10E3/UL (ref 4–11)

## 2023-02-07 RX ORDER — DIPHENHYDRAMINE HYDROCHLORIDE 50 MG/ML
50 INJECTION INTRAMUSCULAR; INTRAVENOUS
Status: CANCELLED
Start: 2023-02-07

## 2023-02-07 RX ORDER — MEPERIDINE HYDROCHLORIDE 25 MG/ML
25 INJECTION INTRAMUSCULAR; INTRAVENOUS; SUBCUTANEOUS EVERY 30 MIN PRN
Status: CANCELLED | OUTPATIENT
Start: 2023-02-07

## 2023-02-07 RX ORDER — HEPARIN SODIUM,PORCINE 10 UNIT/ML
5 VIAL (ML) INTRAVENOUS
Status: CANCELLED | OUTPATIENT
Start: 2023-02-07

## 2023-02-07 RX ORDER — ALBUTEROL SULFATE 0.83 MG/ML
2.5 SOLUTION RESPIRATORY (INHALATION)
Status: CANCELLED | OUTPATIENT
Start: 2023-02-07

## 2023-02-07 RX ORDER — HEPARIN SODIUM (PORCINE) LOCK FLUSH IV SOLN 100 UNIT/ML 100 UNIT/ML
5 SOLUTION INTRAVENOUS
Status: CANCELLED | OUTPATIENT
Start: 2023-02-07

## 2023-02-07 RX ORDER — ALBUTEROL SULFATE 90 UG/1
1-2 AEROSOL, METERED RESPIRATORY (INHALATION)
Status: CANCELLED
Start: 2023-02-07

## 2023-02-07 RX ORDER — EPINEPHRINE 1 MG/ML
0.3 INJECTION, SOLUTION, CONCENTRATE INTRAVENOUS EVERY 5 MIN PRN
Status: CANCELLED | OUTPATIENT
Start: 2023-02-07

## 2023-03-07 ENCOUNTER — LAB REQUISITION (OUTPATIENT)
Dept: LAB | Facility: CLINIC | Age: 33
End: 2023-03-07

## 2023-03-07 DIAGNOSIS — Z36.2 ENCOUNTER FOR OTHER ANTENATAL SCREENING FOLLOW-UP: ICD-10-CM

## 2023-03-07 DIAGNOSIS — Z36.1 ENCOUNTER FOR ANTENATAL SCREENING FOR RAISED ALPHAFETOPROTEIN LEVEL: ICD-10-CM

## 2023-03-07 PROCEDURE — 82105 ALPHA-FETOPROTEIN SERUM: CPT | Performed by: PHYSICIAN ASSISTANT

## 2023-03-07 PROCEDURE — 86762 RUBELLA ANTIBODY: CPT | Performed by: PHYSICIAN ASSISTANT

## 2023-03-10 ENCOUNTER — PATIENT OUTREACH (OUTPATIENT)
Dept: OBGYN | Facility: CLINIC | Age: 33
End: 2023-03-10
Payer: COMMERCIAL

## 2023-03-10 PROBLEM — R87.610 ATYPICAL SQUAMOUS CELLS OF UNDETERMINED SIGNIFICANCE (ASCUS) ON PAPANICOLAOU SMEAR OF CERVIX: Status: ACTIVE | Noted: 2018-08-14

## 2023-03-10 LAB
# FETUSES US: NORMAL
AFP MOM SERPL: 1.79
AFP SERPL-MCNC: 109 NG/ML
AGE - REPORTED: 31.8 YR
CURRENT SMOKER: NORMAL
FAMILY MEMBER DISEASES HX: NO
GA METHOD: NORMAL
GA: NORMAL WK
IDDM PATIENT QL: NORMAL
INTEGRATED SCN PATIENT-IMP: NORMAL
RUBV IGG SERPL QL IA: 1.02 INDEX
RUBV IGG SERPL QL IA: POSITIVE
SPECIMEN DRAWN SERPL: NORMAL

## 2023-03-10 NOTE — LETTER
March 10, 2023      Kary Sebastian  [address]    [place] [address]        Dear ,    At [facility], your health and wellness are our primary concern. That is why we are following up on your most recent Colposcopy.    Please call [phone] to schedule an appointment for your recommended follow-up Pap smear and Human Papillomavirus (HPV) test at your earliest convenience.      If you have completed the appointment outside of the [facility] system, please have the records forwarded to our office. We will update your chart for your provider to review before your next annual wellness visit.     Thank you for choosing [facility]!      Sincerely,    Your [facility] Care Team

## 2023-03-19 ENCOUNTER — HOSPITAL ENCOUNTER (OUTPATIENT)
Facility: HOSPITAL | Age: 33
Discharge: HOME | End: 2023-03-19
Attending: OBSTETRICS & GYNECOLOGY | Admitting: OBSTETRICS & GYNECOLOGY
Payer: COMMERCIAL

## 2023-03-19 VITALS — SYSTOLIC BLOOD PRESSURE: 95 MMHG | DIASTOLIC BLOOD PRESSURE: 54 MMHG | TEMPERATURE: 97.8 F

## 2023-03-19 DIAGNOSIS — N39.0 URINARY TRACT INFECTION WITHOUT HEMATURIA, SITE UNSPECIFIED: Primary | ICD-10-CM

## 2023-03-19 LAB
ALBUMIN UR-MCNC: NEGATIVE MG/DL
APPEARANCE UR: ABNORMAL
BACTERIA #/AREA URNS HPF: ABNORMAL /HPF
BILIRUB UR QL STRIP: NEGATIVE
CLUE CELLS: ABNORMAL
COLOR UR AUTO: ABNORMAL
GLUCOSE UR STRIP-MCNC: NEGATIVE MG/DL
HGB UR QL STRIP: NEGATIVE
KETONES UR STRIP-MCNC: NEGATIVE MG/DL
LEUKOCYTE ESTERASE UR QL STRIP: ABNORMAL
MUCOUS THREADS #/AREA URNS LPF: PRESENT /LPF
NITRATE UR QL: NEGATIVE
PH UR STRIP: 6.5 [PH] (ref 5–7)
RBC URINE: <1 /HPF
SP GR UR STRIP: 1.02 (ref 1–1.03)
SQUAMOUS EPITHELIAL: 17 /HPF
TRICHOMONAS, WET PREP: ABNORMAL
UROBILINOGEN UR STRIP-MCNC: <2 MG/DL
WBC URINE: 3 /HPF
WBC'S/HIGH POWER FIELD, WET PREP: ABNORMAL
YEAST, WET PREP: ABNORMAL

## 2023-03-19 PROCEDURE — 81001 URINALYSIS AUTO W/SCOPE: CPT | Performed by: OBSTETRICS & GYNECOLOGY

## 2023-03-19 PROCEDURE — 258N000003 HC RX IP 258 OP 636: Performed by: OBSTETRICS & GYNECOLOGY

## 2023-03-19 PROCEDURE — G0463 HOSPITAL OUTPT CLINIC VISIT: HCPCS

## 2023-03-19 PROCEDURE — 87210 SMEAR WET MOUNT SALINE/INK: CPT | Performed by: OBSTETRICS & GYNECOLOGY

## 2023-03-19 RX ORDER — NITROFURANTOIN 25; 75 MG/1; MG/1
100 CAPSULE ORAL 2 TIMES DAILY
Qty: 14 CAPSULE | Refills: 0 | Status: ON HOLD | OUTPATIENT
Start: 2023-03-19 | End: 2023-04-21

## 2023-03-19 RX ORDER — LIDOCAINE 40 MG/G
CREAM TOPICAL
Status: DISCONTINUED | OUTPATIENT
Start: 2023-03-19 | End: 2023-03-19 | Stop reason: HOSPADM

## 2023-03-19 RX ADMIN — SODIUM CHLORIDE, POTASSIUM CHLORIDE, SODIUM LACTATE AND CALCIUM CHLORIDE 1000 ML: 600; 310; 30; 20 INJECTION, SOLUTION INTRAVENOUS at 16:37

## 2023-03-19 ASSESSMENT — ACTIVITIES OF DAILY LIVING (ADL): ADLS_ACUITY_SCORE: 35

## 2023-03-19 NOTE — DISCHARGE INSTRUCTIONS
Discharge Instruction for Undelivered Patients      You were seen for: Labor Assessment  We Consulted: Dr Morillo  You had (Test or Medicine):Lab tests     Diet:   Drink 8 to 12 glasses of liquids (milk, juice, water) every day.     Activity:  Call your doctor or nurse midwife if your baby is moving less than usual.     Call your provider if you notice:  Swelling in your face or increased swelling in your hands or legs.  Headaches that are not relieved by Tylenol (acetaminophen).  Changes in your vision (blurring: seeing spots or stars.)  Nausea (sick to your stomach) and vomiting (throwing up).   Weight gain of 5 pounds or more per week.  Heartburn that doesn't go away.  Signs of bladder infection: pain when you urinate (use the toilet), need to go more often and more urgently.  The bag of marquez (rupture of membranes) breaks, or you notice leaking in your underwear.  Bright red blood in your underwear.  Abdominal (lower belly) or stomach pain.  For first baby: Contractions (tightening) less than 5 minutes apart for one hour or more.  Second (plus) baby: Contractions (tightening) less than 10 minutes apart and getting stronger.  *If less than 34 weeks: Contractions (tightening) more than 6 times in one hour.  Increase or change in vaginal discharge (note the color and amount)  Other:  antibiotic at [facility].    Follow-up:  As scheduled in the clinic

## 2023-03-19 NOTE — PROGRESS NOTES
Dr Morillo informed of lab results . Also that she says contractions are better. She feels better. Ok to discharges. Will order antibiotics for her to cover Possible UTI.

## 2023-03-19 NOTE — PROGRESS NOTES
OB Triage note    Kary Sebastian is a 32 year old  at 22w0d with di/di twins who presents with contractions today. She states that they are all over pain/contractions, not just menstrual cramps. They started this morning without inciting incident. She laid low, put her feet up and drank fluids without improvement in her symptoms. No abnormal vaginal discharge, has been diagnosed with BV in the past, and recently was treated with diflucan. She denies bleeding, vaginal itching and other symptoms.    O:  Temp 97.8  F (36.6  C)   LMP 10/16/2022   Breastfeeding Unknown   Gen: Alert, NAD    Wet prep and UA sent    A/P:  Kary Sebastian is a 32 year old  at 22w0d with di/di twins. Will send wet prep/UA and start LR bolus. Pending results, will get TVUS cervical length. If long, then reassuring, but if short, uncertain significance with twins.       Dee Morillo MD, FACOG, [facility]  3/19/2023 4:15 PM

## 2023-03-19 NOTE — PROGRESS NOTES
Presents here today because she is feeling contractions. Has been feeling them since 4 am today. Denies any symptoms of UTI. 22 week twins expected. Denies any discharge . Will update  that she is here

## 2023-03-19 NOTE — PROGRESS NOTES
Feeling better. Contractions are very infrequent.  Babies very active. Informed her of lab results and that Doctor has ordered an antibiotic for her. Cervix closed by exam. Discharge instructions discussed. Home with her children.

## 2023-03-28 ENCOUNTER — APPOINTMENT (OUTPATIENT)
Dept: ULTRASOUND IMAGING | Facility: HOSPITAL | Age: 33
End: 2023-03-28
Attending: OBSTETRICS & GYNECOLOGY
Payer: COMMERCIAL

## 2023-03-28 ENCOUNTER — HOSPITAL ENCOUNTER (OUTPATIENT)
Facility: HOSPITAL | Age: 33
Discharge: HOME | End: 2023-03-28
Attending: OBSTETRICS & GYNECOLOGY | Admitting: OBSTETRICS & GYNECOLOGY
Payer: COMMERCIAL

## 2023-03-28 VITALS
TEMPERATURE: 97.8 F | WEIGHT: 155 LBS | RESPIRATION RATE: 18 BRPM | DIASTOLIC BLOOD PRESSURE: 56 MMHG | SYSTOLIC BLOOD PRESSURE: 98 MMHG | BODY MASS INDEX: 24.91 KG/M2 | HEIGHT: 66 IN | OXYGEN SATURATION: 98 %

## 2023-03-28 LAB
ALBUMIN SERPL BCG-MCNC: 3.4 G/DL (ref 3.5–5.2)
ALBUMIN UR-MCNC: 20 MG/DL
ALP SERPL-CCNC: 54 U/L (ref 35–104)
ALT SERPL W P-5'-P-CCNC: 9 U/L (ref 10–35)
ANION GAP SERPL CALCULATED.3IONS-SCNC: 11 MMOL/L (ref 7–15)
APPEARANCE UR: ABNORMAL
AST SERPL W P-5'-P-CCNC: 13 U/L (ref 10–35)
BACTERIA #/AREA URNS HPF: ABNORMAL /HPF
BILIRUB SERPL-MCNC: 0.2 MG/DL
BILIRUB UR QL STRIP: NEGATIVE
BUN SERPL-MCNC: 5.5 MG/DL (ref 6–20)
CALCIUM SERPL-MCNC: 8.6 MG/DL (ref 8.6–10)
CHLORIDE SERPL-SCNC: 102 MMOL/L (ref 98–107)
COLOR UR AUTO: YELLOW
CREAT SERPL-MCNC: 0.48 MG/DL (ref 0.51–0.95)
DEPRECATED HCO3 PLAS-SCNC: 22 MMOL/L (ref 22–29)
ERYTHROCYTE [DISTWIDTH] IN BLOOD BY AUTOMATED COUNT: 13 % (ref 10–15)
GFR SERPL CREATININE-BSD FRML MDRD: >90 ML/MIN/1.73M2
GLUCOSE SERPL-MCNC: 72 MG/DL (ref 70–99)
GLUCOSE UR STRIP-MCNC: NEGATIVE MG/DL
HCT VFR BLD AUTO: 32.2 % (ref 35–47)
HGB BLD-MCNC: 10.7 G/DL (ref 11.7–15.7)
HGB UR QL STRIP: NEGATIVE
KETONES UR STRIP-MCNC: 40 MG/DL
LEUKOCYTE ESTERASE UR QL STRIP: NEGATIVE
LIPASE SERPL-CCNC: 28 U/L (ref 13–60)
MCH RBC QN AUTO: 31.6 PG (ref 26.5–33)
MCHC RBC AUTO-ENTMCNC: 33.2 G/DL (ref 31.5–36.5)
MCV RBC AUTO: 95 FL (ref 78–100)
MUCOUS THREADS #/AREA URNS LPF: PRESENT /LPF
NITRATE UR QL: NEGATIVE
PH UR STRIP: 6 [PH] (ref 5–7)
PLATELET # BLD AUTO: 263 10E3/UL (ref 150–450)
POTASSIUM SERPL-SCNC: 3.6 MMOL/L (ref 3.4–5.3)
PROT SERPL-MCNC: 5.8 G/DL (ref 6.4–8.3)
RBC # BLD AUTO: 3.39 10E6/UL (ref 3.8–5.2)
RBC URINE: <1 /HPF
SODIUM SERPL-SCNC: 135 MMOL/L (ref 136–145)
SP GR UR STRIP: 1.02 (ref 1–1.03)
SQUAMOUS EPITHELIAL: 12 /HPF
UROBILINOGEN UR STRIP-MCNC: <2 MG/DL
WBC # BLD AUTO: 9.5 10E3/UL (ref 4–11)
WBC URINE: 2 /HPF

## 2023-03-28 PROCEDURE — 250N000013 HC RX MED GY IP 250 OP 250 PS 637: Performed by: OBSTETRICS & GYNECOLOGY

## 2023-03-28 PROCEDURE — 83690 ASSAY OF LIPASE: CPT | Performed by: OBSTETRICS & GYNECOLOGY

## 2023-03-28 PROCEDURE — G0463 HOSPITAL OUTPT CLINIC VISIT: HCPCS

## 2023-03-28 PROCEDURE — 76770 US EXAM ABDO BACK WALL COMP: CPT

## 2023-03-28 PROCEDURE — 81003 URINALYSIS AUTO W/O SCOPE: CPT | Performed by: OBSTETRICS & GYNECOLOGY

## 2023-03-28 PROCEDURE — 80053 COMPREHEN METABOLIC PANEL: CPT | Performed by: OBSTETRICS & GYNECOLOGY

## 2023-03-28 PROCEDURE — 85027 COMPLETE CBC AUTOMATED: CPT | Performed by: OBSTETRICS & GYNECOLOGY

## 2023-03-28 PROCEDURE — 36415 COLL VENOUS BLD VENIPUNCTURE: CPT | Performed by: OBSTETRICS & GYNECOLOGY

## 2023-03-28 RX ORDER — LIDOCAINE 40 MG/G
CREAM TOPICAL
Status: DISCONTINUED | OUTPATIENT
Start: 2023-03-28 | End: 2023-03-28 | Stop reason: HOSPADM

## 2023-03-28 RX ORDER — FAMOTIDINE 10 MG
10 TABLET ORAL 2 TIMES DAILY
Status: DISCONTINUED | OUTPATIENT
Start: 2023-03-28 | End: 2023-03-28

## 2023-03-28 RX ORDER — MAGNESIUM HYDROXIDE/ALUMINUM HYDROXICE/SIMETHICONE 120; 1200; 1200 MG/30ML; MG/30ML; MG/30ML
30 SUSPENSION ORAL EVERY 4 HOURS PRN
Status: DISCONTINUED | OUTPATIENT
Start: 2023-03-28 | End: 2023-03-28 | Stop reason: HOSPADM

## 2023-03-28 RX ORDER — FAMOTIDINE 20 MG/1
20 TABLET, FILM COATED ORAL ONCE
Status: COMPLETED | OUTPATIENT
Start: 2023-03-28 | End: 2023-03-28

## 2023-03-28 RX ADMIN — ALUMINUM HYDROXIDE, MAGNESIUM HYDROXIDE, AND SIMETHICONE 30 ML: 200; 200; 20 SUSPENSION ORAL at 16:57

## 2023-03-28 RX ADMIN — FAMOTIDINE 20 MG: 20 TABLET ORAL at 16:57

## 2023-03-28 ASSESSMENT — ACTIVITIES OF DAILY LIVING (ADL)
ADLS_ACUITY_SCORE: 31
ADLS_ACUITY_SCORE: 31

## 2023-03-28 NOTE — LETTER
[address] [address]  Phone: [phone]  Fax: [phone]    March 28, 2023        Kary Sebastian  [address]    [place] [address]          To whom it may concern:    RE: Kary Sebastian    Patient may return to work  with the follow restrictions of no more than 6 hours per day.  She will be reevaluated next week.     Please contact me for questions or concerns.      Sincerely,        Jenni Lopez MD  [facility]  [phone]

## 2023-03-28 NOTE — PROGRESS NOTES
Data: Kary ladd 32 year old  23w2d [facility]: 3/28/2023  1:05 PM.  Reason for maternal/fetal assessment is stomach/belly to left upper abd sharp pain. Patient reports abdominal pain, nausea and headache. VSS. Fetal movement present. Patient denies leaking of vaginal fluid/rupture of membranes, vaginal bleeding, pelvic pressure, vomiting, visual disturbances, significant edema. Support person is not present.   Action: Verbal consent for EFM and monitors placed upon arrival. Triage assessment completed. Bill of rights reviewed.  Response: Category appropriate for gestational age for FHR. MD updated to above and orders received: awaiting [facility] to call back Patient verbalized agreement with plan.     Salty Levi, RN  3/28/2023 2:22 PM

## 2023-03-28 NOTE — PROGRESS NOTES
Per MD Lee, send a urine for UA/UC. CMP and CBC. pepcid and maalox. US of kidneys/gallbadder. Nurse mentioned US of gallbladder per patient she got that removed a couple years ago, unknown of the year. MD Lee paged.    Salty Levi RN

## 2023-03-28 NOTE — DISCHARGE INSTRUCTIONS
Discharge Instruction for Undelivered Patients      You were seen for:  upper abdomen pain  We Consulted: none  You had (Test or Medicine): Ultrasound kidney normal and blood drawn. Maalox and Pepcid PRN    Diet:   Drink 8 to 12 glasses of liquids (milk, juice, water) every day.  You may eat meals and snacks.     Activity:  Count fetal kicks everyday (see handout)  Call your doctor or nurse midwife if your baby is moving less than usual.     Call your provider if you notice:  Swelling in your face or increased swelling in your hands or legs.  Headaches that are not relieved by Tylenol (acetaminophen).  Changes in your vision (blurring: seeing spots or stars.)  Nausea (sick to your stomach) and vomiting (throwing up).   Weight gain of 5 pounds or more per week.  Heartburn that doesn't go away.  Signs of bladder infection: pain when you urinate (use the toilet), need to go more often and more urgently.  The bag of marquez (rupture of membranes) breaks, or you notice leaking in your underwear.  Bright red blood in your underwear.  Abdominal (lower belly) or stomach pain.  For first baby: Contractions (tightening) less than 5 minutes apart for one hour or more.  Second (plus) baby: Contractions (tightening) less than 10 minutes apart and getting stronger.  *If less than 34 weeks: Contractions (tightening) more than 6 times in one hour.  Increase or change in vaginal discharge (note the color and amount).      Follow-up:  As scheduled in the clinic next week with MD Lee.       Ok per MD Lopez to take PRN Maalox liquid and PRN Famotidine or Pepcid tablet 20 mg for indigestion. Based on your urine results, continue to drink more fluids.

## 2023-03-28 NOTE — PROGRESS NOTES
Ok to discharge home per MD Lopez. discharge to home via ambulation at 1807. Discharge instructions given and no concerns/question from patient. Reviewed Maalox and Pepcid medications. Encouraged patient to drink more based on the UA results.     Salty Levi RN

## 2023-04-08 ENCOUNTER — APPOINTMENT (OUTPATIENT)
Dept: ULTRASOUND IMAGING | Facility: HOSPITAL | Age: 33
End: 2023-04-08
Attending: OBSTETRICS & GYNECOLOGY
Payer: COMMERCIAL

## 2023-04-08 ENCOUNTER — HOSPITAL ENCOUNTER (OUTPATIENT)
Facility: HOSPITAL | Age: 33
Discharge: HOME | End: 2023-04-08
Attending: OBSTETRICS & GYNECOLOGY | Admitting: OBSTETRICS & GYNECOLOGY
Payer: COMMERCIAL

## 2023-04-08 VITALS — SYSTOLIC BLOOD PRESSURE: 97 MMHG | DIASTOLIC BLOOD PRESSURE: 53 MMHG | RESPIRATION RATE: 17 BRPM | TEMPERATURE: 98 F

## 2023-04-08 PROBLEM — Z36.89 ENCOUNTER FOR TRIAGE IN PREGNANT PATIENT: Status: ACTIVE | Noted: 2023-04-08

## 2023-04-08 LAB
ALBUMIN UR-MCNC: 70 MG/DL
APPEARANCE UR: ABNORMAL
BACTERIA #/AREA URNS HPF: ABNORMAL /HPF
BASOPHILS # BLD AUTO: 0 10E3/UL (ref 0–0.2)
BASOPHILS NFR BLD AUTO: 0 %
BILIRUB UR QL STRIP: NEGATIVE
CLUE CELLS: NORMAL
COLOR UR AUTO: YELLOW
EOSINOPHIL # BLD AUTO: 0.1 10E3/UL (ref 0–0.7)
EOSINOPHIL NFR BLD AUTO: 1 %
ERYTHROCYTE [DISTWIDTH] IN BLOOD BY AUTOMATED COUNT: 12.4 % (ref 10–15)
GLUCOSE UR STRIP-MCNC: NEGATIVE MG/DL
HCT VFR BLD AUTO: 31.6 % (ref 35–47)
HGB BLD-MCNC: 10.6 G/DL (ref 11.7–15.7)
HGB UR QL STRIP: NEGATIVE
HOLD SPECIMEN: NORMAL
HOLD SPECIMEN: NORMAL
IMM GRANULOCYTES # BLD: 0.1 10E3/UL
IMM GRANULOCYTES NFR BLD: 1 %
KETONES UR STRIP-MCNC: 20 MG/DL
LEUKOCYTE ESTERASE UR QL STRIP: ABNORMAL
LYMPHOCYTES # BLD AUTO: 1.6 10E3/UL (ref 0.8–5.3)
LYMPHOCYTES NFR BLD AUTO: 19 %
MCH RBC QN AUTO: 31.4 PG (ref 26.5–33)
MCHC RBC AUTO-ENTMCNC: 33.5 G/DL (ref 31.5–36.5)
MCV RBC AUTO: 94 FL (ref 78–100)
MONOCYTES # BLD AUTO: 0.6 10E3/UL (ref 0–1.3)
MONOCYTES NFR BLD AUTO: 7 %
MUCOUS THREADS #/AREA URNS LPF: PRESENT /LPF
NEUTROPHILS # BLD AUTO: 6.4 10E3/UL (ref 1.6–8.3)
NEUTROPHILS NFR BLD AUTO: 72 %
NITRATE UR QL: NEGATIVE
NRBC # BLD AUTO: 0 10E3/UL
NRBC BLD AUTO-RTO: 0 /100
PH UR STRIP: 6 [PH] (ref 5–7)
PLATELET # BLD AUTO: 265 10E3/UL (ref 150–450)
RBC # BLD AUTO: 3.38 10E6/UL (ref 3.8–5.2)
RBC URINE: 1 /HPF
SP GR UR STRIP: 1.03 (ref 1–1.03)
SQUAMOUS EPITHELIAL: 35 /HPF
TRICHOMONAS, WET PREP: NORMAL
UROBILINOGEN UR STRIP-MCNC: <2 MG/DL
WBC # BLD AUTO: 8.8 10E3/UL (ref 4–11)
WBC URINE: 4 /HPF
WBC'S/HIGH POWER FIELD, WET PREP: NORMAL
YEAST, WET PREP: NORMAL

## 2023-04-08 PROCEDURE — 258N000003 HC RX IP 258 OP 636: Performed by: OBSTETRICS & GYNECOLOGY

## 2023-04-08 PROCEDURE — 87210 SMEAR WET MOUNT SALINE/INK: CPT | Performed by: OBSTETRICS & GYNECOLOGY

## 2023-04-08 PROCEDURE — 250N000011 HC RX IP 250 OP 636: Performed by: OBSTETRICS & GYNECOLOGY

## 2023-04-08 PROCEDURE — 87653 STREP B DNA AMP PROBE: CPT | Performed by: OBSTETRICS & GYNECOLOGY

## 2023-04-08 PROCEDURE — 76816 OB US FOLLOW-UP PER FETUS: CPT | Mod: 59

## 2023-04-08 PROCEDURE — G0463 HOSPITAL OUTPT CLINIC VISIT: HCPCS

## 2023-04-08 PROCEDURE — 85014 HEMATOCRIT: CPT | Performed by: OBSTETRICS & GYNECOLOGY

## 2023-04-08 PROCEDURE — 81003 URINALYSIS AUTO W/O SCOPE: CPT | Performed by: OBSTETRICS & GYNECOLOGY

## 2023-04-08 RX ORDER — ONDANSETRON 2 MG/ML
4 INJECTION INTRAMUSCULAR; INTRAVENOUS EVERY 6 HOURS PRN
Status: DISCONTINUED | OUTPATIENT
Start: 2023-04-08 | End: 2023-04-08 | Stop reason: HOSPADM

## 2023-04-08 RX ORDER — LIDOCAINE 40 MG/G
CREAM TOPICAL
Status: DISCONTINUED | OUTPATIENT
Start: 2023-04-08 | End: 2023-04-08 | Stop reason: HOSPADM

## 2023-04-08 RX ADMIN — ONDANSETRON 4 MG: 2 INJECTION INTRAMUSCULAR; INTRAVENOUS at 07:20

## 2023-04-08 RX ADMIN — SODIUM CHLORIDE, POTASSIUM CHLORIDE, SODIUM LACTATE AND CALCIUM CHLORIDE 1000 ML: 600; 310; 30; 20 INJECTION, SOLUTION INTRAVENOUS at 07:02

## 2023-04-08 ASSESSMENT — ACTIVITIES OF DAILY LIVING (ADL)
ADLS_ACUITY_SCORE: 35

## 2023-04-08 NOTE — PROGRESS NOTES
Kary Sebastian presented self to [facility] with c/o n/v/d since yesterday. Pt reported that she used the bathroom and wiped and saw mucous. Pt has DI-DI twins. On arrival, I was able to find heart beat of one baby but unable to find the heart beat of the 2nd baby. Dr. Oshea called to the room.  Beverly Hall RN

## 2023-04-08 NOTE — PROGRESS NOTES
Bedside report received from Previous RN. Attempting to trace fetal heart tones. RN gave Zofran to help with nausea, states hasn't eaten much since 1p yesterday otherwise hasn't had an appetite, emesis around 0500 this mourning. Encourages ice chips and crackers. RN at bedside for over 30minutes attempting to trace heart tones. Reassuring Doptone achieved on both infants, lots of fetal movements noted X2. Dr Miles called to bedside to assist with ultrasound with difficulty tracing continuously. At bedside OB discussed POC for awaiting all of lab results continue fluids and trying oral intake. Ordered BPP since difficulty obtaining reactive NST on both fetuses. Patient states nausea feels much better after zofran. Desiring to rest until BPP, does not want to attempt crackers at this time, will readdress after Ultrasound.

## 2023-04-08 NOTE — PROGRESS NOTES
OB reviewed lab work and RN read US report to OB. POC discharge home with zofran, urine awaiting cultures. Follow up apt with Dr Lee in clinic this coming week. Encourage fluids. discharge instructions gone through with patient verbalized understanding, questions and concerns answered.

## 2023-04-08 NOTE — UTILIZATION REVIEW
Ultrasound called back saying BPP can be done at 1000. Patient encouraged to order breakfast and has been able to rest comfortably. Off EFM awaiting BPP

## 2023-04-08 NOTE — PROGRESS NOTES
Patient returned from US. Upbeat, showing RN pictures and smiling. Discussed anticipated POC if BPP WDL for discharge home with zofran and to be seen by Dr Lee sometime next week. Patient verbalize understanding and okay with plan. Encouraged meal prior, patient still states not much appetite but desiring some crackers.
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
## 2023-04-08 NOTE — DISCHARGE INSTRUCTIONS
Discharge Instruction for Undelivered Patients      You were seen for:  Nausea  We Consulted: Dr Miles  You had (Test or Medicine):Ultrasound     Diet:   Drink 8 to 12 glasses of liquids (milk, juice, water) every day.  You may eat meals and snacks.     Activity:  Count fetal kicks everyday (see handout)  Call your doctor or nurse midwife if your baby is moving less than usual.     Call your provider if you notice:  Swelling in your face or increased swelling in your hands or legs.  Headaches that are not relieved by Tylenol (acetaminophen).  Changes in your vision (blurring: seeing spots or stars.)  Nausea (sick to your stomach) and vomiting (throwing up).   Weight gain of 5 pounds or more per week.  Heartburn that doesn't go away.  Signs of bladder infection: pain when you urinate (use the toilet), need to go more often and more urgently.  The bag of marquez (rupture of membranes) breaks, or you notice leaking in your underwear.  Bright red blood in your underwear.  Abdominal (lower belly) or stomach pain.  For first baby: Contractions (tightening) less than 5 minutes apart for one hour or more.  Second (plus) baby: Contractions (tightening) less than 10 minutes apart and getting stronger.  *If less than 34 weeks: Contractions (tightening) more than 6 times in one hour.  Increase or change in vaginal discharge (note the color and amount)    Follow-up:  Make an Appointment to be seen later this week with Dr Lee in clinic.

## 2023-04-08 NOTE — PROGRESS NOTES
"Dr. Oshea arrived at bedside and used bedside ultrasound to located the 2nd heart tone but the twins were unable to stay on the monitor. Patient was also moving too much saying \"is hard for me to stay on my back \".  As Dr. Oshea was collecting vaginal swab  she  asked writer to hand her the specimen container. Writer did and was waiting to receive the specimen from .    Pt asked writer to go and stay at the head of her bed. Writer responded that she is waiting to receive specimen from the doctor. Patient immediately states\" I want another nurse\". Writer  left the room and nurse Kim IZAGUIRRE took over patient's care.  "

## 2023-04-09 LAB — GP B STREP DNA SPEC QL NAA+PROBE: POSITIVE

## 2023-04-21 ENCOUNTER — HOSPITAL ENCOUNTER (INPATIENT)
Facility: CLINIC | Age: 33
LOS: 3 days | Discharge: HOME | DRG: 832 | End: 2023-04-24
Attending: OBSTETRICS & GYNECOLOGY | Admitting: OBSTETRICS & GYNECOLOGY
Payer: COMMERCIAL

## 2023-04-21 ENCOUNTER — HOSPITAL ENCOUNTER (OUTPATIENT)
Facility: HOSPITAL | Age: 33
Discharge: ANOTHER HOSPITAL | DRG: 833 | End: 2023-04-21
Attending: OBSTETRICS & GYNECOLOGY | Admitting: STUDENT IN AN ORGANIZED HEALTH CARE EDUCATION/TRAINING PROGRAM
Payer: COMMERCIAL

## 2023-04-21 VITALS
RESPIRATION RATE: 18 BRPM | OXYGEN SATURATION: 99 % | DIASTOLIC BLOOD PRESSURE: 69 MMHG | TEMPERATURE: 98.1 F | SYSTOLIC BLOOD PRESSURE: 109 MMHG

## 2023-04-21 PROBLEM — O47.00 PRETERM CONTRACTIONS: Status: ACTIVE | Noted: 2023-04-21

## 2023-04-21 PROBLEM — O60.00 PRETERM LABOR: Status: ACTIVE | Noted: 2023-04-21

## 2023-04-21 LAB
ABO/RH(D): NORMAL
ALBUMIN UR-MCNC: NEGATIVE MG/DL
ANTIBODY SCREEN, TUBE: NORMAL
APPEARANCE UR: CLEAR
BASOPHILS # BLD AUTO: 0 10E3/UL (ref 0–0.2)
BASOPHILS NFR BLD AUTO: 0 %
BILIRUB UR QL STRIP: NEGATIVE
CLUE CELLS: ABNORMAL
COLOR UR AUTO: NORMAL
EOSINOPHIL # BLD AUTO: 0.1 10E3/UL (ref 0–0.7)
EOSINOPHIL NFR BLD AUTO: 1 %
ERYTHROCYTE [DISTWIDTH] IN BLOOD BY AUTOMATED COUNT: 12 % (ref 10–15)
GLUCOSE UR STRIP-MCNC: NEGATIVE MG/DL
HCT VFR BLD AUTO: 33.4 % (ref 35–47)
HGB BLD-MCNC: 11.2 G/DL (ref 11.7–15.7)
HGB UR QL STRIP: NEGATIVE
HOLD SPECIMEN: NORMAL
HOLD SPECIMEN: NORMAL
IMM GRANULOCYTES # BLD: 0.1 10E3/UL
IMM GRANULOCYTES NFR BLD: 1 %
KETONES UR STRIP-MCNC: NEGATIVE MG/DL
LEUKOCYTE ESTERASE UR QL STRIP: NEGATIVE
LYMPHOCYTES # BLD AUTO: 2.3 10E3/UL (ref 0.8–5.3)
LYMPHOCYTES NFR BLD AUTO: 21 %
MCH RBC QN AUTO: 31.3 PG (ref 26.5–33)
MCHC RBC AUTO-ENTMCNC: 33.5 G/DL (ref 31.5–36.5)
MCV RBC AUTO: 93 FL (ref 78–100)
MONOCYTES # BLD AUTO: 0.8 10E3/UL (ref 0–1.3)
MONOCYTES NFR BLD AUTO: 7 %
NEUTROPHILS # BLD AUTO: 7.8 10E3/UL (ref 1.6–8.3)
NEUTROPHILS NFR BLD AUTO: 70 %
NITRATE UR QL: NEGATIVE
NRBC # BLD AUTO: 0 10E3/UL
NRBC BLD AUTO-RTO: 0 /100
PH UR STRIP: 7 [PH] (ref 5–7)
PLATELET # BLD AUTO: 252 10E3/UL (ref 150–450)
RBC # BLD AUTO: 3.58 10E6/UL (ref 3.8–5.2)
SP GR UR STRIP: 1.02 (ref 1–1.03)
SPECIMEN EXPIRATION DATE: NORMAL
SPECIMEN EXPIRATION DATE: NORMAL
TRICHOMONAS, WET PREP: ABNORMAL
UROBILINOGEN UR STRIP-MCNC: <2 MG/DL
WBC # BLD AUTO: 11.2 10E3/UL (ref 4–11)
WBC'S/HIGH POWER FIELD, WET PREP: ABNORMAL
YEAST, WET PREP: PRESENT

## 2023-04-21 PROCEDURE — G0463 HOSPITAL OUTPT CLINIC VISIT: HCPCS

## 2023-04-21 PROCEDURE — 81003 URINALYSIS AUTO W/O SCOPE: CPT | Performed by: STUDENT IN AN ORGANIZED HEALTH CARE EDUCATION/TRAINING PROGRAM

## 2023-04-21 PROCEDURE — 120N000002 HC R&B MED SURG/OB UMMC

## 2023-04-21 PROCEDURE — 86850 RBC ANTIBODY SCREEN: CPT | Performed by: STUDENT IN AN ORGANIZED HEALTH CARE EDUCATION/TRAINING PROGRAM

## 2023-04-21 PROCEDURE — 250N000013 HC RX MED GY IP 250 OP 250 PS 637: Performed by: STUDENT IN AN ORGANIZED HEALTH CARE EDUCATION/TRAINING PROGRAM

## 2023-04-21 PROCEDURE — 36415 COLL VENOUS BLD VENIPUNCTURE: CPT | Performed by: STUDENT IN AN ORGANIZED HEALTH CARE EDUCATION/TRAINING PROGRAM

## 2023-04-21 PROCEDURE — 87491 CHLMYD TRACH DNA AMP PROBE: CPT | Performed by: STUDENT IN AN ORGANIZED HEALTH CARE EDUCATION/TRAINING PROGRAM

## 2023-04-21 PROCEDURE — 120N000001 HC R&B MED SURG/OB

## 2023-04-21 PROCEDURE — 999N000127 HC STATISTIC PERIPHERAL IV START W US GUIDANCE

## 2023-04-21 PROCEDURE — 86901 BLOOD TYPING SEROLOGIC RH(D): CPT | Performed by: STUDENT IN AN ORGANIZED HEALTH CARE EDUCATION/TRAINING PROGRAM

## 2023-04-21 PROCEDURE — 96372 THER/PROPH/DIAG INJ SC/IM: CPT | Performed by: STUDENT IN AN ORGANIZED HEALTH CARE EDUCATION/TRAINING PROGRAM

## 2023-04-21 PROCEDURE — 258N000003 HC RX IP 258 OP 636: Performed by: STUDENT IN AN ORGANIZED HEALTH CARE EDUCATION/TRAINING PROGRAM

## 2023-04-21 PROCEDURE — 250N000011 HC RX IP 250 OP 636: Performed by: STUDENT IN AN ORGANIZED HEALTH CARE EDUCATION/TRAINING PROGRAM

## 2023-04-21 PROCEDURE — 85025 COMPLETE CBC W/AUTO DIFF WBC: CPT | Performed by: STUDENT IN AN ORGANIZED HEALTH CARE EDUCATION/TRAINING PROGRAM

## 2023-04-21 PROCEDURE — 87591 N.GONORRHOEAE DNA AMP PROB: CPT | Performed by: STUDENT IN AN ORGANIZED HEALTH CARE EDUCATION/TRAINING PROGRAM

## 2023-04-21 PROCEDURE — 87210 SMEAR WET MOUNT SALINE/INK: CPT | Performed by: STUDENT IN AN ORGANIZED HEALTH CARE EDUCATION/TRAINING PROGRAM

## 2023-04-21 RX ORDER — METOCLOPRAMIDE HYDROCHLORIDE 5 MG/ML
10 INJECTION INTRAMUSCULAR; INTRAVENOUS EVERY 6 HOURS PRN
Status: DISCONTINUED | OUTPATIENT
Start: 2023-04-21 | End: 2023-04-24 | Stop reason: HOSPADM

## 2023-04-21 RX ORDER — INDOMETHACIN 25 MG/1
25 CAPSULE ORAL EVERY 6 HOURS
Status: DISCONTINUED | OUTPATIENT
Start: 2023-04-22 | End: 2023-04-21 | Stop reason: HOSPADM

## 2023-04-21 RX ORDER — AMOXICILLIN 250 MG
2 CAPSULE ORAL 2 TIMES DAILY PRN
Status: DISCONTINUED | OUTPATIENT
Start: 2023-04-21 | End: 2023-04-24 | Stop reason: HOSPADM

## 2023-04-21 RX ORDER — ONDANSETRON 4 MG/1
4 TABLET, ORALLY DISINTEGRATING ORAL EVERY 6 HOURS PRN
Status: DISCONTINUED | OUTPATIENT
Start: 2023-04-21 | End: 2023-04-24 | Stop reason: HOSPADM

## 2023-04-21 RX ORDER — MAGNESIUM SULFATE 4 G/50ML
4 INJECTION INTRAVENOUS ONCE
Status: COMPLETED | OUTPATIENT
Start: 2023-04-21 | End: 2023-04-21

## 2023-04-21 RX ORDER — AMOXICILLIN 250 MG
1 CAPSULE ORAL 2 TIMES DAILY
Status: DISCONTINUED | OUTPATIENT
Start: 2023-04-21 | End: 2023-04-21 | Stop reason: HOSPADM

## 2023-04-21 RX ORDER — DOCUSATE SODIUM 100 MG/1
100 CAPSULE, LIQUID FILLED ORAL 2 TIMES DAILY
Status: DISCONTINUED | OUTPATIENT
Start: 2023-04-21 | End: 2023-04-21 | Stop reason: HOSPADM

## 2023-04-21 RX ORDER — SIMETHICONE 80 MG
160 TABLET,CHEWABLE ORAL EVERY 4 HOURS PRN
Status: DISCONTINUED | OUTPATIENT
Start: 2023-04-21 | End: 2023-04-21 | Stop reason: HOSPADM

## 2023-04-21 RX ORDER — DIPHENHYDRAMINE HYDROCHLORIDE 50 MG/ML
25 INJECTION INTRAMUSCULAR; INTRAVENOUS EVERY 6 HOURS PRN
Status: DISCONTINUED | OUTPATIENT
Start: 2023-04-21 | End: 2023-04-21 | Stop reason: HOSPADM

## 2023-04-21 RX ORDER — DIPHENHYDRAMINE HYDROCHLORIDE 50 MG/ML
25 INJECTION INTRAMUSCULAR; INTRAVENOUS EVERY 6 HOURS PRN
Status: DISCONTINUED | OUTPATIENT
Start: 2023-04-21 | End: 2023-04-24 | Stop reason: HOSPADM

## 2023-04-21 RX ORDER — CALCIUM GLUCONATE 94 MG/ML
1 INJECTION, SOLUTION INTRAVENOUS
Status: DISCONTINUED | OUTPATIENT
Start: 2023-04-21 | End: 2023-04-21 | Stop reason: HOSPADM

## 2023-04-21 RX ORDER — ONDANSETRON 2 MG/ML
4 INJECTION INTRAMUSCULAR; INTRAVENOUS EVERY 6 HOURS PRN
Status: DISCONTINUED | OUTPATIENT
Start: 2023-04-21 | End: 2023-04-21 | Stop reason: HOSPADM

## 2023-04-21 RX ORDER — HYDROXYZINE HYDROCHLORIDE 50 MG/1
50 TABLET, FILM COATED ORAL
Status: DISCONTINUED | OUTPATIENT
Start: 2023-04-21 | End: 2023-04-21 | Stop reason: HOSPADM

## 2023-04-21 RX ORDER — BETAMETHASONE SODIUM PHOSPHATE AND BETAMETHASONE ACETATE 3; 3 MG/ML; MG/ML
12 INJECTION, SUSPENSION INTRA-ARTICULAR; INTRALESIONAL; INTRAMUSCULAR; SOFT TISSUE EVERY 24 HOURS
Status: COMPLETED | OUTPATIENT
Start: 2023-04-22 | End: 2023-04-22

## 2023-04-21 RX ORDER — ONDANSETRON 4 MG/1
4 TABLET, ORALLY DISINTEGRATING ORAL EVERY 6 HOURS PRN
Status: DISCONTINUED | OUTPATIENT
Start: 2023-04-21 | End: 2023-04-21 | Stop reason: HOSPADM

## 2023-04-21 RX ORDER — MAGNESIUM SULFATE IN WATER 40 MG/ML
2 INJECTION, SOLUTION INTRAVENOUS CONTINUOUS
Status: DISCONTINUED | OUTPATIENT
Start: 2023-04-21 | End: 2023-04-21 | Stop reason: HOSPADM

## 2023-04-21 RX ORDER — ACETAMINOPHEN 325 MG/1
650 TABLET ORAL EVERY 4 HOURS PRN
Status: DISCONTINUED | OUTPATIENT
Start: 2023-04-21 | End: 2023-04-24 | Stop reason: HOSPADM

## 2023-04-21 RX ORDER — LIDOCAINE 40 MG/G
CREAM TOPICAL
Status: DISCONTINUED | OUTPATIENT
Start: 2023-04-21 | End: 2023-04-24 | Stop reason: HOSPADM

## 2023-04-21 RX ORDER — MAGNESIUM HYDROXIDE/ALUMINUM HYDROXICE/SIMETHICONE 120; 1200; 1200 MG/30ML; MG/30ML; MG/30ML
30 SUSPENSION ORAL
Status: COMPLETED | OUTPATIENT
Start: 2023-04-21 | End: 2023-04-21

## 2023-04-21 RX ORDER — PENICILLIN G POTASSIUM 5000000 [IU]/1
5 INJECTION, POWDER, FOR SOLUTION INTRAMUSCULAR; INTRAVENOUS ONCE
Status: COMPLETED | OUTPATIENT
Start: 2023-04-21 | End: 2023-04-21

## 2023-04-21 RX ORDER — PANTOPRAZOLE SODIUM 40 MG/1
40 TABLET, DELAYED RELEASE ORAL
Status: DISCONTINUED | OUTPATIENT
Start: 2023-04-22 | End: 2023-04-21 | Stop reason: HOSPADM

## 2023-04-21 RX ORDER — AMOXICILLIN 250 MG
1 CAPSULE ORAL 2 TIMES DAILY PRN
Status: DISCONTINUED | OUTPATIENT
Start: 2023-04-21 | End: 2023-04-24 | Stop reason: HOSPADM

## 2023-04-21 RX ORDER — DIPHENHYDRAMINE HCL 25 MG
25 CAPSULE ORAL EVERY 6 HOURS PRN
Status: DISCONTINUED | OUTPATIENT
Start: 2023-04-21 | End: 2023-04-21 | Stop reason: HOSPADM

## 2023-04-21 RX ORDER — BETAMETHASONE SODIUM PHOSPHATE AND BETAMETHASONE ACETATE 3; 3 MG/ML; MG/ML
12 INJECTION, SUSPENSION INTRA-ARTICULAR; INTRALESIONAL; INTRAMUSCULAR; SOFT TISSUE EVERY 24 HOURS
Status: DISCONTINUED | OUTPATIENT
Start: 2023-04-21 | End: 2023-04-21 | Stop reason: HOSPADM

## 2023-04-21 RX ORDER — ALBUTEROL SULFATE 90 UG/1
2 AEROSOL, METERED RESPIRATORY (INHALATION) EVERY 4 HOURS PRN
Status: DISCONTINUED | OUTPATIENT
Start: 2023-04-21 | End: 2023-04-24 | Stop reason: HOSPADM

## 2023-04-21 RX ORDER — DIPHENHYDRAMINE HCL 25 MG
25 CAPSULE ORAL EVERY 6 HOURS PRN
Status: DISCONTINUED | OUTPATIENT
Start: 2023-04-21 | End: 2023-04-24 | Stop reason: HOSPADM

## 2023-04-21 RX ORDER — MAGNESIUM SULFATE HEPTAHYDRATE 40 MG/ML
2 INJECTION, SOLUTION INTRAVENOUS ONCE
Status: COMPLETED | OUTPATIENT
Start: 2023-04-21 | End: 2023-04-21

## 2023-04-21 RX ORDER — METOCLOPRAMIDE 10 MG/1
10 TABLET ORAL EVERY 6 HOURS PRN
Status: DISCONTINUED | OUTPATIENT
Start: 2023-04-21 | End: 2023-04-21 | Stop reason: HOSPADM

## 2023-04-21 RX ORDER — AMOXICILLIN 250 MG
2 CAPSULE ORAL 2 TIMES DAILY
Status: DISCONTINUED | OUTPATIENT
Start: 2023-04-21 | End: 2023-04-21 | Stop reason: HOSPADM

## 2023-04-21 RX ORDER — HYDROXYZINE HYDROCHLORIDE 50 MG/1
50 TABLET, FILM COATED ORAL
Status: DISCONTINUED | OUTPATIENT
Start: 2023-04-21 | End: 2023-04-24

## 2023-04-21 RX ORDER — SODIUM CHLORIDE, SODIUM LACTATE, POTASSIUM CHLORIDE, CALCIUM CHLORIDE 600; 310; 30; 20 MG/100ML; MG/100ML; MG/100ML; MG/100ML
INJECTION, SOLUTION INTRAVENOUS CONTINUOUS
Status: DISCONTINUED | OUTPATIENT
Start: 2023-04-21 | End: 2023-04-21 | Stop reason: HOSPADM

## 2023-04-21 RX ORDER — METOCLOPRAMIDE HYDROCHLORIDE 5 MG/ML
10 INJECTION INTRAMUSCULAR; INTRAVENOUS EVERY 6 HOURS PRN
Status: DISCONTINUED | OUTPATIENT
Start: 2023-04-21 | End: 2023-04-21 | Stop reason: HOSPADM

## 2023-04-21 RX ORDER — INDOMETHACIN 25 MG/1
50 CAPSULE ORAL ONCE
Status: COMPLETED | OUTPATIENT
Start: 2023-04-21 | End: 2023-04-21

## 2023-04-21 RX ORDER — PRENATAL VIT/IRON FUM/FOLIC AC 27MG-0.8MG
1 TABLET ORAL DAILY
Status: DISCONTINUED | OUTPATIENT
Start: 2023-04-22 | End: 2023-04-21 | Stop reason: HOSPADM

## 2023-04-21 RX ORDER — PENICILLIN G 3000000 [IU]/50ML
3 INJECTION, SOLUTION INTRAVENOUS EVERY 4 HOURS
Status: DISCONTINUED | OUTPATIENT
Start: 2023-04-22 | End: 2023-04-21 | Stop reason: HOSPADM

## 2023-04-21 RX ORDER — ONDANSETRON 2 MG/ML
4 INJECTION INTRAMUSCULAR; INTRAVENOUS EVERY 6 HOURS PRN
Status: DISCONTINUED | OUTPATIENT
Start: 2023-04-21 | End: 2023-04-24 | Stop reason: HOSPADM

## 2023-04-21 RX ORDER — METOCLOPRAMIDE 10 MG/1
10 TABLET ORAL EVERY 6 HOURS PRN
Status: DISCONTINUED | OUTPATIENT
Start: 2023-04-21 | End: 2023-04-24 | Stop reason: HOSPADM

## 2023-04-21 RX ORDER — LIDOCAINE 40 MG/G
CREAM TOPICAL
Status: DISCONTINUED | OUTPATIENT
Start: 2023-04-21 | End: 2023-04-21 | Stop reason: HOSPADM

## 2023-04-21 RX ORDER — ACETAMINOPHEN 325 MG/1
650 TABLET ORAL EVERY 4 HOURS PRN
Status: DISCONTINUED | OUTPATIENT
Start: 2023-04-21 | End: 2023-04-21 | Stop reason: HOSPADM

## 2023-04-21 RX ADMIN — MAGNESIUM SULFATE HEPTAHYDRATE 2 G/HR: 40 INJECTION, SOLUTION INTRAVENOUS at 21:27

## 2023-04-21 RX ADMIN — BETAMETHASONE SODIUM PHOSPHATE AND BETAMETHASONE ACETATE 12 MG: 3; 3 INJECTION, SUSPENSION INTRA-ARTICULAR; INTRALESIONAL; INTRAMUSCULAR at 20:42

## 2023-04-21 RX ADMIN — MAGNESIUM SULFATE HEPTAHYDRATE 4 G: 80 INJECTION, SOLUTION INTRAVENOUS at 20:53

## 2023-04-21 RX ADMIN — ALUMINUM HYDROXIDE, MAGNESIUM HYDROXIDE, AND SIMETHICONE 30 ML: 200; 200; 20 SUSPENSION ORAL at 21:25

## 2023-04-21 RX ADMIN — ONDANSETRON 4 MG: 2 INJECTION INTRAMUSCULAR; INTRAVENOUS at 20:50

## 2023-04-21 RX ADMIN — MAGNESIUM SULFATE HEPTAHYDRATE 2 G: 40 INJECTION, SOLUTION INTRAVENOUS at 21:15

## 2023-04-21 RX ADMIN — INDOMETHACIN 50 MG: 25 CAPSULE ORAL at 20:30

## 2023-04-21 RX ADMIN — PENICILLIN G POTASSIUM 5 MILLION UNITS: 5000000 POWDER, FOR SOLUTION INTRAMUSCULAR; INTRAPLEURAL; INTRATHECAL; INTRAVENOUS at 21:00

## 2023-04-21 RX ADMIN — SODIUM CHLORIDE, POTASSIUM CHLORIDE, SODIUM LACTATE AND CALCIUM CHLORIDE: 600; 310; 30; 20 INJECTION, SOLUTION INTRAVENOUS at 20:53

## 2023-04-21 ASSESSMENT — ACTIVITIES OF DAILY LIVING (ADL)
ADLS_ACUITY_SCORE: 31
ADLS_ACUITY_SCORE: 31

## 2023-04-22 ENCOUNTER — ANESTHESIA (OUTPATIENT)
Dept: OBGYN | Facility: CLINIC | Age: 33
End: 2023-04-22

## 2023-04-22 ENCOUNTER — ANESTHESIA EVENT (OUTPATIENT)
Dept: OBGYN | Facility: CLINIC | Age: 33
End: 2023-04-22

## 2023-04-22 LAB
ABO/RH(D): NORMAL
ANTIBODY SCREEN: NEGATIVE
C TRACH DNA SPEC QL NAA+PROBE: NEGATIVE
N GONORRHOEA DNA SPEC QL NAA+PROBE: NEGATIVE
SPECIMEN EXPIRATION DATE: NORMAL

## 2023-04-22 PROCEDURE — 120N000002 HC R&B MED SURG/OB UMMC

## 2023-04-22 PROCEDURE — 250N000011 HC RX IP 250 OP 636: Performed by: STUDENT IN AN ORGANIZED HEALTH CARE EDUCATION/TRAINING PROGRAM

## 2023-04-22 PROCEDURE — 36415 COLL VENOUS BLD VENIPUNCTURE: CPT | Performed by: STUDENT IN AN ORGANIZED HEALTH CARE EDUCATION/TRAINING PROGRAM

## 2023-04-22 PROCEDURE — 258N000003 HC RX IP 258 OP 636: Performed by: STUDENT IN AN ORGANIZED HEALTH CARE EDUCATION/TRAINING PROGRAM

## 2023-04-22 PROCEDURE — 59025 FETAL NON-STRESS TEST: CPT | Mod: 26 | Performed by: OBSTETRICS & GYNECOLOGY

## 2023-04-22 PROCEDURE — 99221 1ST HOSP IP/OBS SF/LOW 40: CPT | Mod: 25 | Performed by: OBSTETRICS & GYNECOLOGY

## 2023-04-22 PROCEDURE — 99232 SBSQ HOSP IP/OBS MODERATE 35: CPT | Performed by: NURSE PRACTITIONER

## 2023-04-22 PROCEDURE — 250N000013 HC RX MED GY IP 250 OP 250 PS 637: Performed by: STUDENT IN AN ORGANIZED HEALTH CARE EDUCATION/TRAINING PROGRAM

## 2023-04-22 PROCEDURE — 86850 RBC ANTIBODY SCREEN: CPT | Performed by: STUDENT IN AN ORGANIZED HEALTH CARE EDUCATION/TRAINING PROGRAM

## 2023-04-22 RX ORDER — MAGNESIUM SULFATE IN WATER 40 MG/ML
2 INJECTION, SOLUTION INTRAVENOUS CONTINUOUS
Status: DISCONTINUED | OUTPATIENT
Start: 2023-04-22 | End: 2023-04-22

## 2023-04-22 RX ORDER — PENICILLIN G POTASSIUM 5000000 [IU]/1
5 INJECTION, POWDER, FOR SOLUTION INTRAMUSCULAR; INTRAVENOUS ONCE
Status: COMPLETED | OUTPATIENT
Start: 2023-04-22 | End: 2023-04-22

## 2023-04-22 RX ORDER — FAMOTIDINE 10 MG
10 TABLET ORAL 2 TIMES DAILY
Status: DISCONTINUED | OUTPATIENT
Start: 2023-04-22 | End: 2023-04-24 | Stop reason: HOSPADM

## 2023-04-22 RX ORDER — INDOMETHACIN 50 MG/1
50 CAPSULE ORAL ONCE
Status: DISCONTINUED | OUTPATIENT
Start: 2023-04-22 | End: 2023-04-24 | Stop reason: HOSPADM

## 2023-04-22 RX ORDER — INDOMETHACIN 25 MG/1
25 CAPSULE ORAL EVERY 6 HOURS
Status: DISPENSED | OUTPATIENT
Start: 2023-04-22 | End: 2023-04-23

## 2023-04-22 RX ORDER — MORPHINE SULFATE 10 MG/ML
10 INJECTION, SOLUTION INTRAMUSCULAR; INTRAVENOUS ONCE
Status: COMPLETED | OUTPATIENT
Start: 2023-04-22 | End: 2023-04-22

## 2023-04-22 RX ORDER — FLUCONAZOLE 150 MG/1
150 TABLET ORAL ONCE
Status: COMPLETED | OUTPATIENT
Start: 2023-04-22 | End: 2023-04-22

## 2023-04-22 RX ORDER — PENICILLIN G 3000000 [IU]/50ML
3 INJECTION, SOLUTION INTRAVENOUS EVERY 4 HOURS
Status: DISCONTINUED | OUTPATIENT
Start: 2023-04-22 | End: 2023-04-22

## 2023-04-22 RX ORDER — HYDROXYZINE HYDROCHLORIDE 50 MG/1
100 TABLET, FILM COATED ORAL ONCE
Status: COMPLETED | OUTPATIENT
Start: 2023-04-22 | End: 2023-04-22

## 2023-04-22 RX ORDER — INDOMETHACIN 25 MG/1
25 CAPSULE ORAL EVERY 6 HOURS
Status: COMPLETED | OUTPATIENT
Start: 2023-04-22 | End: 2023-04-23

## 2023-04-22 RX ORDER — CALCIUM GLUCONATE 94 MG/ML
1 INJECTION, SOLUTION INTRAVENOUS
Status: DISCONTINUED | OUTPATIENT
Start: 2023-04-22 | End: 2023-04-24 | Stop reason: HOSPADM

## 2023-04-22 RX ORDER — FAMOTIDINE 10 MG
10 TABLET ORAL 2 TIMES DAILY
Status: DISCONTINUED | OUTPATIENT
Start: 2023-04-22 | End: 2023-04-22

## 2023-04-22 RX ORDER — SODIUM CHLORIDE, SODIUM LACTATE, POTASSIUM CHLORIDE, CALCIUM CHLORIDE 600; 310; 30; 20 MG/100ML; MG/100ML; MG/100ML; MG/100ML
INJECTION, SOLUTION INTRAVENOUS CONTINUOUS
Status: DISCONTINUED | OUTPATIENT
Start: 2023-04-22 | End: 2023-04-24 | Stop reason: HOSPADM

## 2023-04-22 RX ADMIN — FAMOTIDINE 10 MG: 10 TABLET ORAL at 21:03

## 2023-04-22 RX ADMIN — METOCLOPRAMIDE HYDROCHLORIDE 10 MG: 5 INJECTION INTRAMUSCULAR; INTRAVENOUS at 21:02

## 2023-04-22 RX ADMIN — INDOMETHACIN 25 MG: 25 CAPSULE ORAL at 07:55

## 2023-04-22 RX ADMIN — FAMOTIDINE 10 MG: 10 TABLET ORAL at 07:56

## 2023-04-22 RX ADMIN — INDOMETHACIN 25 MG: 25 CAPSULE ORAL at 21:03

## 2023-04-22 RX ADMIN — PENICILLIN G 3 MILLION UNITS: 3000000 INJECTION, SOLUTION INTRAVENOUS at 01:57

## 2023-04-22 RX ADMIN — PENICILLIN G 3 MILLION UNITS: 3000000 INJECTION, SOLUTION INTRAVENOUS at 05:18

## 2023-04-22 RX ADMIN — MORPHINE SULFATE 10 MG: 10 INJECTION INTRAVENOUS at 23:57

## 2023-04-22 RX ADMIN — BETAMETHASONE SODIUM PHOSPHATE AND BETAMETHASONE ACETATE 12 MG: 3; 3 INJECTION, SUSPENSION INTRA-ARTICULAR; INTRALESIONAL; INTRAMUSCULAR at 21:03

## 2023-04-22 RX ADMIN — PENICILLIN G 3 MILLION UNITS: 3000000 INJECTION, SOLUTION INTRAVENOUS at 09:29

## 2023-04-22 RX ADMIN — METOCLOPRAMIDE HYDROCHLORIDE 10 MG: 5 INJECTION INTRAMUSCULAR; INTRAVENOUS at 04:38

## 2023-04-22 RX ADMIN — FAMOTIDINE 10 MG: 10 TABLET ORAL at 01:43

## 2023-04-22 RX ADMIN — SODIUM CHLORIDE, POTASSIUM CHLORIDE, SODIUM LACTATE AND CALCIUM CHLORIDE: 600; 310; 30; 20 INJECTION, SOLUTION INTRAVENOUS at 07:00

## 2023-04-22 RX ADMIN — INDOMETHACIN 25 MG: 25 CAPSULE ORAL at 02:45

## 2023-04-22 RX ADMIN — HYDROXYZINE HYDROCHLORIDE 100 MG: 50 TABLET, FILM COATED ORAL at 23:56

## 2023-04-22 RX ADMIN — FLUCONAZOLE 150 MG: 150 TABLET ORAL at 01:00

## 2023-04-22 RX ADMIN — INDOMETHACIN 25 MG: 25 CAPSULE ORAL at 14:01

## 2023-04-22 ASSESSMENT — ACTIVITIES OF DAILY LIVING (ADL)
ADLS_ACUITY_SCORE: 18
ADLS_ACUITY_SCORE: 35
ADLS_ACUITY_SCORE: 18
ADLS_ACUITY_SCORE: 35
ADLS_ACUITY_SCORE: 18
ADLS_ACUITY_SCORE: 18

## 2023-04-22 ASSESSMENT — ENCOUNTER SYMPTOMS: SEIZURES: 0

## 2023-04-22 NOTE — PLAN OF CARE
Labor Shift Note  Data: Contraction pattern irregular, spaced out. Fetal assessment Appropriate for Gestational Age.  Betamethasone 1st dose given on  . Magnesium Sulfate for neuroprotection maintenance infusion ongoing at 2gm/hr.  Interventions: Continue uterine/fetal assessment continuously. Activity level:Regular activity, and preventive measures including Medications, Positioning and Frequent voiding. Encourage active range of motion and frequent position changes.  Plan: Continue expectant management. Observe for and notify care provider of indications of progressing labor or signs of fetal/maternal compromise.
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
## 2023-04-22 NOTE — PLAN OF CARE
Patient is here with  labor with di/di twins. VSS: EFM as charted. Patient comfortable, will get second betamethasone tonight. Still has non-painful contractions with unchanged cervix. Plan to continue to observe.

## 2023-04-22 NOTE — PROGRESS NOTES
Brief Progress Note    Patient feeling more pressure. Repeat SVE /-3. Will continue to monitor closely, if any further cervical change, will move towards delivery via  section.    Nayana Schmidt MD  Obstetrics & Gynecology, PGY-3  2023 3:05 AM

## 2023-04-22 NOTE — PROGRESS NOTES
SVE by provider unchanged, 3cm and thick. EMS here to transfer pt to [facility]. Report given to REEMA Pena. Pt discharged with EMS.

## 2023-04-22 NOTE — PROGRESS NOTES
Data: [facility] presented [facility]: 2023  7:12 PM.  Reason for maternal/fetal assessment is uterine contractions and diarrhea, urinary urgency, and increased vaginal discharge.  Patient reports fetal movement is normal. Patient is a 26w5d . Prenatal record reviewed. Pregnancy has been uncomplicated. Support person is not present.     Action: Verbal consent for EFM. Triage assessment completed. Dr. Cortes updated of pt status. Provider at bedside and performed bedside US, both babies vertex. Orders received to collect wet prep and UA. SVE by provider 3cm. See orders placed by MD.

## 2023-04-22 NOTE — PROGRESS NOTES
Magnesium bolus complete. RN at bedside for completion of bolus. 2g continuous magnesium infusion started after bolus. Pt given IV penicillin, PO indocin, and betamethasone. Provider rechecked SVE at 2106 with no change noted, pt remains 3cm and thick. Babies remain difficult to trace due to frequent fetal and maternal movement, provider updated and aware. RN at bedside frequently attempting to adjust US. Plan for pt to transfer to [facility]. Pt agreeable to plan of care. Emtala initiated, report called to REEMA Pena. Awaiting ambulance. Will continue to monitor.
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
## 2023-04-22 NOTE — CONSULTS
_       [facility]                      Neonatology Advanced Practice Antepartum Counseling Consult      I was asked to provide antepartum counseling for Kary Sebastian at the request of Milton Reese MD secondary to likely  delivery of twins. Ms. Sebastian is currently 26 6/7 weeks and has a hx significant for asthma, chronic kidney disease, depression, and migraine. Betamethasone was administered on 2023 with planned dose for 2023. Ms. Sebastian, was counseled on the expected hospital course, potential risks, and outcomes associated with twin infants born at approximately 26 6/7 weeks gestation. The counseling included: morbidity, mortality, initial delivery room stabilization, respiratory course, lung development, RDS, retinopathy of prematurity, infection (including NEC), intraventricular hemorrhage, nutrition, growth and development, and long term outcomes. Please feel free to call with any additional questions or concerns.          Fern Gonzalez, CNP on 2023 at 1:57 AM   Advanced Practice Service    Intensive Care Unit  [facility]      Floor Time (min): 5  Face to Face Time (min): 40  Total Time (minutes): 45  More than 50% of my time was spent in direct, face to face, antepartum counseling with the above patient.
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
## 2023-04-22 NOTE — DISCHARGE SUMMARY
[facility] Discharge Summary    Kary Sebastian MRN# [id number]   Age: 32 year old YOB: 1990     Date of Admission:  2023  Date of Discharge:  2023  Admitting Physician:  Milton Reese MD  Discharge Physician:  Milton Reese MD     Admission Diagnosis:  -  at 26w6d  -  labor  - Di-di twin pregnancy  - Asthma  - MDD/ESTELA  - Tobacco use  - Rh negative    Discharge Diagnosis:  - Same as above  -  labor, stalled out    Procedures:  None    Consultations:    - NICU  - Anesthesia  - Social work    Medications prior to admission:  Medications Prior to Admission   Medication Sig Dispense Refill Last Dose     albuterol (PROAIR HFA/PROVENTIL HFA/VENTOLIN HFA) 108 (90 Base) MCG/ACT inhaler Inhale 2 puffs into the lungs every 4 hours        albuterol (PROVENTIL) (2.5 MG/3ML) 0.083% neb solution Inhale 2.5 mg into the lungs        amphetamine-dextroamphetamine (ADDERALL) 5 MG tablet Take 1 tablet by mouth daily (Patient not taking: Reported on 2022)        cholecalciferol 50 MCG (2000 UT) CAPS Take 1,000 Units by mouth (Patient not taking: Reported on 2022)        escitalopram (LEXAPRO) 10 MG tablet  (Patient not taking: Reported on 2022)        fluticasone-salmeterol (ADVAIR) 250-50 MCG/ACT inhaler  (Patient not taking: Reported on 2022)        medical cannabis (Patient's own supply) See Admin Instructions (The purpose of this order is to document that the patient reports taking medical cannabis.  This is not a prescription, and is not used to certify that the patient has a qualifying medical condition.)        ondansetron (ZOFRAN ODT) 4 MG ODT tab Take 1 tablet (4 mg) by mouth every 8 hours as needed for nausea 30 tablet 0      simethicone (MYLICON) 80 MG chewable tablet Take 1 tablet (80 mg) by mouth every 6 hours as needed for flatulence or cramping 30 tablet 0      [DISCONTINUED] ALPRAZolam (XANAX) 0.5 MG tablet  (Patient  not taking: Reported on 2022)        [DISCONTINUED] tiZANidine (ZANAFLEX) 2 MG tablet Take by mouth every 6 hours (Patient not taking: Reported on 2022)            Brief History of Presentation:  Kary Sebastian is a 32 year old  at 26w6d by LMP c/w 5w3d US, who presents with painful contractions. She has been gopi for multiple days, but felt the contractions were worse today. Was worked up on  for  labor and was found to be closed at that time. Today, she was evaluated at [facility] and was found to be 3/20/-3 which is a significant change from her prior exam. Denies LOF. Has had some VB after her multiple cervical checks today. Normal fetal movement.      Antepartum Course:  She received a courseof betamethasone, was started on IV Magnesium for fetal neuroprotection, indomethacin tocolysis, and started on penicillin for GBS prophylaxis at the OSH. She was seen by NICU and consented for a  section. Cervix progressed to 4/50/-2. Her contractions stopped at this point and her Mg and penicillin were stopped after 12 hours. Indomethacin tocolysis was continued through her 48h betamethasone window and then discontinued. Her cervix remained stable at 4/50/-2 after 20 hours without indomethacin tocolysis and patient denied any cramping/contractions and so she was deemed stable for discharge.    Her 28 week labs were collected while she was here. She received her TDAP and rhogam. She had a growth US of both twins.    Twin A: EFW 1028g, 37%, MVP 8.9cm, mild polyhydramnios  Twin B: EFW 958g, 20%, MVP 10.6cm, mild polyhydramnios    Discharge Medications:     Review of your medicines      UNREVIEWED medicines. Ask your doctor about these medicines      Dose / Directions   amphetamine-dextroamphetamine 5 MG tablet  Commonly known as: ADDERALL      Dose: 1 tablet  Take 1 tablet by mouth daily  Refills: 0     cholecalciferol 50 MCG ( UT) Caps      Dose: 1,000 Units  Take 1,000 Units  by mouth  Refills: 0     escitalopram 10 MG tablet  Commonly known as: LEXAPRO      Refills: 0     fluticasone-salmeterol 250-50 MCG/ACT inhaler  Commonly known as: ADVAIR      Refills: 0        CONTINUE these medicines which have NOT CHANGED      Dose / Directions   * albuterol 108 (90 Base) MCG/ACT inhaler  Commonly known as: PROAIR HFA/PROVENTIL HFA/VENTOLIN HFA      Dose: 2 puff  Inhale 2 puffs into the lungs every 4 hours  Refills: 0     * albuterol (2.5 MG/3ML) 0.083% neb solution  Commonly known as: PROVENTIL      Dose: 2.5 mg  Inhale 2.5 mg into the lungs  Refills: 0     medical cannabis  (Patient's own supply)      See Admin Instructions (The purpose of this order is to document that the patient reports taking medical cannabis.  This is not a prescription, and is not used to certify that the patient has a qualifying medical condition.)  Refills: 0     ondansetron 4 MG ODT tab  Commonly known as: ZOFRAN ODT      Dose: 4 mg  Take 1 tablet (4 mg) by mouth every 8 hours as needed for nausea  Quantity: 30 tablet  Refills: 0     simethicone 80 MG chewable tablet  Commonly known as: MYLICON  Used for: Acid indigestion      Dose: 80 mg  Take 1 tablet (80 mg) by mouth every 6 hours as needed for flatulence or cramping  Quantity: 30 tablet  Refills: 0         * This list has 2 medication(s) that are the same as other medications prescribed for you. Read the directions carefully, and ask your doctor or other care provider to review them with you.            STOP taking    ALPRAZolam 0.5 MG tablet  Commonly known as: XANAX        tiZANidine 2 MG tablet  Commonly known as: ZANAFLEX                 Discharge Instructions:  Call or present to labor and delivery if you experience:   -Regular painful contractions concerning for labor   -Leakage of fluid concerning for ruptured membranes   -Decreased fetal movement   -Bright red vaginal bleeding    -Headache, vision changes, upper abdominal pain, significant increase in  swelling,   generalized unwell feeling    Nayana Schmidt MD  Obstetrics & Gynecology, PGY-3  04/24/2023 12:24 PM

## 2023-04-23 LAB
IRON BINDING CAPACITY (ROCHE): 554 UG/DL (ref 240–430)
IRON SATN MFR SERPL: 8 % (ref 15–46)
IRON SERPL-MCNC: 43 UG/DL (ref 37–145)
TRANSFERRIN SERPL-MCNC: 539 MG/DL (ref 200–360)

## 2023-04-23 PROCEDURE — 36415 COLL VENOUS BLD VENIPUNCTURE: CPT | Performed by: STUDENT IN AN ORGANIZED HEALTH CARE EDUCATION/TRAINING PROGRAM

## 2023-04-23 PROCEDURE — 99232 SBSQ HOSP IP/OBS MODERATE 35: CPT | Mod: 25 | Performed by: OBSTETRICS & GYNECOLOGY

## 2023-04-23 PROCEDURE — 59025 FETAL NON-STRESS TEST: CPT | Mod: 26 | Performed by: OBSTETRICS & GYNECOLOGY

## 2023-04-23 PROCEDURE — 84466 ASSAY OF TRANSFERRIN: CPT | Performed by: STUDENT IN AN ORGANIZED HEALTH CARE EDUCATION/TRAINING PROGRAM

## 2023-04-23 PROCEDURE — 250N000013 HC RX MED GY IP 250 OP 250 PS 637: Performed by: STUDENT IN AN ORGANIZED HEALTH CARE EDUCATION/TRAINING PROGRAM

## 2023-04-23 PROCEDURE — 90471 IMMUNIZATION ADMIN: CPT | Performed by: STUDENT IN AN ORGANIZED HEALTH CARE EDUCATION/TRAINING PROGRAM

## 2023-04-23 PROCEDURE — 258N000003 HC RX IP 258 OP 636: Performed by: STUDENT IN AN ORGANIZED HEALTH CARE EDUCATION/TRAINING PROGRAM

## 2023-04-23 PROCEDURE — 90715 TDAP VACCINE 7 YRS/> IM: CPT | Performed by: STUDENT IN AN ORGANIZED HEALTH CARE EDUCATION/TRAINING PROGRAM

## 2023-04-23 PROCEDURE — 83550 IRON BINDING TEST: CPT | Performed by: STUDENT IN AN ORGANIZED HEALTH CARE EDUCATION/TRAINING PROGRAM

## 2023-04-23 PROCEDURE — 250N000011 HC RX IP 250 OP 636: Performed by: STUDENT IN AN ORGANIZED HEALTH CARE EDUCATION/TRAINING PROGRAM

## 2023-04-23 PROCEDURE — 250N000013 HC RX MED GY IP 250 OP 250 PS 637

## 2023-04-23 PROCEDURE — 120N000002 HC R&B MED SURG/OB UMMC

## 2023-04-23 RX ORDER — HYDROXYZINE HYDROCHLORIDE 25 MG/1
25 TABLET, FILM COATED ORAL EVERY 6 HOURS PRN
Status: DISCONTINUED | OUTPATIENT
Start: 2023-04-23 | End: 2023-04-24 | Stop reason: HOSPADM

## 2023-04-23 RX ORDER — HYDROXYZINE HYDROCHLORIDE 50 MG/1
50 TABLET, FILM COATED ORAL EVERY 6 HOURS PRN
Status: DISCONTINUED | OUTPATIENT
Start: 2023-04-23 | End: 2023-04-24 | Stop reason: HOSPADM

## 2023-04-23 RX ORDER — HYDROXYZINE HYDROCHLORIDE 50 MG/1
100 TABLET, FILM COATED ORAL
Status: DISCONTINUED | OUTPATIENT
Start: 2023-04-23 | End: 2023-04-24 | Stop reason: HOSPADM

## 2023-04-23 RX ORDER — MORPHINE SULFATE 10 MG/ML
10 INJECTION, SOLUTION INTRAMUSCULAR; INTRAVENOUS
Status: DISCONTINUED | OUTPATIENT
Start: 2023-04-23 | End: 2023-04-24 | Stop reason: HOSPADM

## 2023-04-23 RX ADMIN — HYDROXYZINE HYDROCHLORIDE 25 MG: 25 TABLET, FILM COATED ORAL at 12:46

## 2023-04-23 RX ADMIN — HYDROXYZINE HYDROCHLORIDE 50 MG: 50 TABLET, FILM COATED ORAL at 23:04

## 2023-04-23 RX ADMIN — FAMOTIDINE 10 MG: 10 TABLET ORAL at 21:01

## 2023-04-23 RX ADMIN — INDOMETHACIN 25 MG: 25 CAPSULE ORAL at 14:45

## 2023-04-23 RX ADMIN — IRON SUCROSE 300 MG: 20 INJECTION, SOLUTION INTRAVENOUS at 14:45

## 2023-04-23 RX ADMIN — MAGNESIUM HYDROXIDE 30 ML: 400 SUSPENSION ORAL at 21:23

## 2023-04-23 RX ADMIN — INDOMETHACIN 25 MG: 25 CAPSULE ORAL at 02:40

## 2023-04-23 RX ADMIN — CLOSTRIDIUM TETANI TOXOID ANTIGEN (FORMALDEHYDE INACTIVATED), CORYNEBACTERIUM DIPHTHERIAE TOXOID ANTIGEN (FORMALDEHYDE INACTIVATED), BORDETELLA PERTUSSIS TOXOID ANTIGEN (GLUTARALDEHYDE INACTIVATED), BORDETELLA PERTUSSIS FILAMENTOUS HEMAGGLUTININ ANTIGEN (FORMALDEHYDE INACTIVATED), BORDETELLA PERTUSSIS PERTACTIN ANTIGEN, AND BORDETELLA PERTUSSIS FIMBRIAE 2/3 ANTIGEN 0.5 ML: 5; 2; 2.5; 5; 3; 5 INJECTION, SUSPENSION INTRAMUSCULAR at 12:47

## 2023-04-23 RX ADMIN — IRON SUCROSE 300 MG: 20 INJECTION, SOLUTION INTRAVENOUS at 14:47

## 2023-04-23 RX ADMIN — HUMAN RHO(D) IMMUNE GLOBULIN 300 MCG: 1500 SOLUTION INTRAMUSCULAR; INTRAVENOUS at 12:49

## 2023-04-23 RX ADMIN — INDOMETHACIN 25 MG: 25 CAPSULE ORAL at 08:44

## 2023-04-23 RX ADMIN — FAMOTIDINE 10 MG: 10 TABLET ORAL at 08:45

## 2023-04-23 ASSESSMENT — ACTIVITIES OF DAILY LIVING (ADL)
ADLS_ACUITY_SCORE: 18

## 2023-04-23 NOTE — CARE PLAN
BPP completed for Kary Sebastian. Baby A 8/8. Baby B 8/8. Milk of Magnesia ordered for reflux. No continuous EFM overnight. Formal MFM ultrasound in the morning. Kary Sebastian is resting comfortably in bed and has no concerns at this time.    End of shift report given to Fern ELIZONDO. All questions answered and care relinquished at this time.
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
## 2023-04-23 NOTE — CARE PLAN
Report given to Fern SCOTT RN. All questions answered. Kary Sebastian stable and comfortable with no concerns. Contractions are infrequent and vaginal bleeding is scant. Care relinquished at this time.

## 2023-04-23 NOTE — PROGRESS NOTES
MFM PROGRESS NOTE    Subjective:  Patient is doing well, slept overnight. Mild cramping this morning when she ambulates. Largely comfortable. Small spotting - appears like old blood from previous cervical checks. Denies LOF. Normal fetal movement.    PE:  Vit:   Patient Vitals for the past 4 hrs:   BP Temp Temp src Pulse Resp   23 0634 105/55 97.9  F (36.6  C) Oral 74 16      Gen: Well-appearing, NAD  CV: Well perfused, regular rate   Pulm: Breathing comfortably  Abd: Soft, gravid, non-tender   Ext: trace LE edema b/l    FHT: not completed yet this AM    Assessment/Plan:  Kary Sebastian is a 32 year old , at 27w1d by LMP c/w 5w3d US, who presents with concern for  labor.     labor  - Patient with cervical change from c/l/h on  to /-2 this admission, has stalled at this time.  - S/p BMZ (-), 12h Mg, 12h PCN for GBS prophylaxis. S/p indomethacin tocolysis (completed through betamethasone window)  - S/p CS consent including discussion of possible classical uterine incision  - If no further cervical change plan for discharge with adequate pain control and no evidence of labor this afternoon. Will reassess cervix this afternoon and discharge today if unchanged.  - If no labor prior to 28 weeks, can deliver at [facility] which is patient's preference.    Anxiety  - Patient is very anxious about providing care for her two children at home, s/p SW  - Vistaril 25-60mg q6h prn    Iron deficiency anemia  - Fe normal, but low end of normal. TIBC elevated and transferrin low c/f ADAN.  - S/p IV iron x1 ()    MDD/ESTELA: Not taking PTA adderall or lexapro d/t nausea/vomiting. Will restart postpartum  Asthma: albuterol prn  Tobacco use: Does not want NRT at this time    Fetal well-being  - TID monitoring for 1 hour, as well as prn.   - Growth + anatomy US ordered for today, results pending  - Unstable fetal lie     PNC  - Rh pos, GBS POSITIVE  - 28 week labs collected this admission, RPR  ordered today  - Will need GCT completed 1w after BMZ course  - S/p rhogam, TDAP ()  - Recommend leave of absence from work given risk of  labor, limited options for transportation and the fact that she is sole provider for childcare for her children. If unable to take time of from work, recommend helping with living arrangements, patient has discussed with her mother and if possible we recommend making arrangements so that transportation can be supported.     DVT prophylaxis  - SCDs + ambulation, consider lovenox if prolonged admission    Nayana Schmidt MD  Obstetrics & Gynecology, PGY-3  2023 9:21 AM    Physician Attestation   I, Milton Reese MD, saw this patient with the resident/fellow and agree with the resident s findings and plan of care as documented in the resident s note.      I personally reviewed vital signs, medications, labs and imaging.    Key findings: twins di di with PTL at 27w1d. No cervical change over the past 48 hours.    Milton Reese MD  Date of Service (when I saw the patient): 23    Time Spent on this Encounter   I, Milton Reese MD, spent a total of 30 minutes bedside and on the inpatient unit today managing the care of Kary Sebastian.  Over 50% of my time on the unit was spent counseling the patient and /or coordinating care regarding care. See note for details.

## 2023-04-24 ENCOUNTER — APPOINTMENT (OUTPATIENT)
Dept: ULTRASOUND IMAGING | Facility: CLINIC | Age: 33
DRG: 832 | End: 2023-04-24
Payer: COMMERCIAL

## 2023-04-24 ENCOUNTER — HOSPITAL ENCOUNTER (INPATIENT)
Facility: CLINIC | Age: 33
End: 2023-04-24
Admitting: OBSTETRICS & GYNECOLOGY
Payer: COMMERCIAL

## 2023-04-24 VITALS
WEIGHT: 161 LBS | BODY MASS INDEX: 25.88 KG/M2 | DIASTOLIC BLOOD PRESSURE: 58 MMHG | OXYGEN SATURATION: 97 % | SYSTOLIC BLOOD PRESSURE: 105 MMHG | RESPIRATION RATE: 16 BRPM | HEART RATE: 74 BPM | TEMPERATURE: 97.9 F | HEIGHT: 66 IN

## 2023-04-24 LAB
ERYTHROCYTE [DISTWIDTH] IN BLOOD BY AUTOMATED COUNT: 12.2 % (ref 10–15)
HCT VFR BLD AUTO: 30.5 % (ref 35–47)
HGB BLD-MCNC: 10.1 G/DL (ref 11.7–15.7)
MCH RBC QN AUTO: 31.5 PG (ref 26.5–33)
MCHC RBC AUTO-ENTMCNC: 33.1 G/DL (ref 31.5–36.5)
MCV RBC AUTO: 95 FL (ref 78–100)
PLATELET # BLD AUTO: 247 10E3/UL (ref 150–450)
RBC # BLD AUTO: 3.21 10E6/UL (ref 3.8–5.2)
T PALLIDUM AB SER QL: NONREACTIVE
WBC # BLD AUTO: 10.2 10E3/UL (ref 4–11)

## 2023-04-24 PROCEDURE — 76812 OB US DETAILED ADDL FETUS: CPT | Mod: 26 | Performed by: OBSTETRICS & GYNECOLOGY

## 2023-04-24 PROCEDURE — 99238 HOSP IP/OBS DSCHRG MGMT 30/<: CPT | Mod: 25 | Performed by: OBSTETRICS & GYNECOLOGY

## 2023-04-24 PROCEDURE — 250N000013 HC RX MED GY IP 250 OP 250 PS 637

## 2023-04-24 PROCEDURE — 250N000013 HC RX MED GY IP 250 OP 250 PS 637: Performed by: STUDENT IN AN ORGANIZED HEALTH CARE EDUCATION/TRAINING PROGRAM

## 2023-04-24 PROCEDURE — 86780 TREPONEMA PALLIDUM: CPT | Performed by: STUDENT IN AN ORGANIZED HEALTH CARE EDUCATION/TRAINING PROGRAM

## 2023-04-24 PROCEDURE — 36415 COLL VENOUS BLD VENIPUNCTURE: CPT | Performed by: STUDENT IN AN ORGANIZED HEALTH CARE EDUCATION/TRAINING PROGRAM

## 2023-04-24 PROCEDURE — 59025 FETAL NON-STRESS TEST: CPT | Mod: 26 | Performed by: OBSTETRICS & GYNECOLOGY

## 2023-04-24 PROCEDURE — 85027 COMPLETE CBC AUTOMATED: CPT | Performed by: STUDENT IN AN ORGANIZED HEALTH CARE EDUCATION/TRAINING PROGRAM

## 2023-04-24 PROCEDURE — 76811 OB US DETAILED SNGL FETUS: CPT | Mod: 26 | Performed by: OBSTETRICS & GYNECOLOGY

## 2023-04-24 PROCEDURE — 76811 OB US DETAILED SNGL FETUS: CPT

## 2023-04-24 RX ADMIN — FAMOTIDINE 10 MG: 10 TABLET ORAL at 08:24

## 2023-04-24 RX ADMIN — MAGNESIUM HYDROXIDE 30 ML: 400 SUSPENSION ORAL at 08:24

## 2023-04-24 ASSESSMENT — ACTIVITIES OF DAILY LIVING (ADL)
ADLS_ACUITY_SCORE: 18

## 2023-04-24 NOTE — PROGRESS NOTES
US Procedure Note    BPP for di-di twins performed with bedside ultrasound, due to babies moving too much for NST.    Baby A:  Fluid: MVP >2 cm  Breathing motions: Present  Gross movement: Present  Fine movement: Present  Total score: 8/8    Baby B:  Fluid: MVP >2 cm  Breathing motions: Present  Gross movement: Present  Fine movement: Present  Total score: 8/8    BPPs 8/8 for both babies, reassuring. No further NSTs required for tonight.    Continue present management, with plan for MFM Comprehensive & Growth US tomorrow morning.    Maritza Winkler MD  OB/GYN, PGY-3  04/23/2023, 7:14 PM

## 2023-04-24 NOTE — CARE PLAN
Discharge instructions printed, reviewed, and signed. Questions encouraged. Kary Sebastian verbalized understanding. Taxi to [facility]. Stable and comfortable upon ambulating to [facility].

## 2023-04-24 NOTE — PROGRESS NOTES
EDI checked in with Kary this morning after a long talk yesterday. She shared that her oldest daughter is with her mom and that her other two are with their dad and that he is going to keep them for the week even if she is discharged. We talked about her most likely being discharged today. She asked about getting her car from [place]. EDI will provide a cab for her to get there and asked the nurse to page social work when she is ready to go.     Rajani Schaeffer, MOISES, [facility]  Maternal and Child Health   Office: [phone]  Pager: [phone]  After Hours Pager: [phone]  [email or web address]

## 2023-04-24 NOTE — DISCHARGE INSTRUCTIONS
Understanding  Labor  Going into labor before week 37 of pregnancy is called  labor.  labor can cause your baby to be born too soon. This can lead to health problems for your baby.     Before labor, the cervix is thick and closed.      In  labor, the cervix begins to efface (thin) and dilate (open).     Symptoms of  labor  If you think you re having  labor, get medical help right away. Contractions alone don t mean you re in  labor. What matters more are changes in your cervix. The cervix is the opening at the lower end of the uterus. Symptoms of  labor include:  4 or more contractions per hour  Strong contractions  Constant menstrual-like cramping  Low-back pain  Mucous or bloody fluid from the vagina  Bleeding or spotting in the second or third trimester  Evaluating  labor  Your healthcare provider will try to find out if you re in  labor or just having contractions. They may watch you for a few hours. You may have these tests:  Pelvic exam. This is to see if your cervix has effaced (thinned) and dilated (opened).  Uterine activity monitoring. This is used to detect contractions.  Fetal monitoring. This is done to check the health of your baby.  Ultrasound. This test looks at your baby s size and position.  Amniocentesis. This test checks how mature your baby s lungs are.  Caring for yourself at home  If you have  contractions, but your cervix is still thick and closed, your healthcare provider may tell you to:  Drink plenty of water.  Do fewer activities.  Rest in bed on your side.  Don't have intercourse or stimulate your nipples.  When to call your healthcare provider  Call your healthcare provider if you have any of these:  4 or more contractions per hour  Bag of water breaks  Bleeding or spotting  If you need hospital care   labor often means that you need hospital care. You may need complete bed rest. You may have an IV  (intravenous) line in your arm or hand. This is to give you fluids. You may be given pills or injections. These are done to help prevent contractions. You may get a medicine called a corticosteroid. This is to help your baby s lungs mature more quickly.  Are you at risk?  Any pregnant woman can have  labor. It may start for no reason. But these risk factors can increase your chances:  Past  labor or early birth  Smoking, drug, or alcohol use in pregnancy  A multiple pregnancy (twins or more)  Problems with the shape of the uterus  Bleeding during the pregnancy  The dangers of  birth  A baby born too soon may have health problems. This is because the baby didn t have enough time to grow. Some of the risks for your baby include:  Not breastfeeding or feeding well  Having immature lungs  Bleeding in the brain  Death  Reaching term  Your goal is to get as close to term (week 37 or later) as you can before giving birth. The closer you get to term, the higher your chance of having a healthy baby. Work with your healthcare provider. Together, you can take steps that may keep you from giving birth too early.  Martell last reviewed this educational content on 10/1/2021    8703-8614 [employer]. All rights reserved. This information is not intended as a substitute for professional medical care. Always follow your healthcare professional's instructions.

## 2023-04-24 NOTE — PLAN OF CARE
Goal: Absence of Hospital-Acquired Illness or Injury  Outcome: Met  Intervention: Identify and Manage Fall Risk  Recent Flowsheet Documentation  Taken 4/24/2023 0835 by Yair Young RN  Safety Promotion/Fall Prevention:   clutter free environment maintained   patient and family education   room near nurse's station  Goal: Optimal Comfort and Wellbeing  Outcome: Met  Intervention: Provide Person-Centered Care  Recent Flowsheet Documentation  Taken 4/24/2023 0835 by Yair Young RN  Trust Relationship/Rapport:   care explained   choices provided  Goal: Readiness for Transition of Care  Outcome: Met     Problem: Fall Injury Risk  Goal: Absence of Fall and Fall-Related Injury  Outcome: Met  Intervention: Promote Injury-Free Environment  Recent Flowsheet Documentation  Taken 4/24/2023 0835 by Yair Young RN  Safety Promotion/Fall Prevention:   clutter free environment maintained   patient and family education   room near nurse's station   Goal Outcome Evaluation:           Overall Patient Progress: improvingOverall Patient Progress: improving    Outcome Evaluation: Cervix unchanged and contractions stoppped. Stable

## 2023-04-25 ENCOUNTER — PATIENT OUTREACH (OUTPATIENT)
Dept: CARE COORDINATION | Facility: CLINIC | Age: 33
End: 2023-04-25
Payer: COMMERCIAL

## 2023-04-27 ENCOUNTER — APPOINTMENT (OUTPATIENT)
Dept: ULTRASOUND IMAGING | Facility: HOSPITAL | Age: 33
DRG: 833 | End: 2023-04-27
Attending: OBSTETRICS & GYNECOLOGY
Payer: COMMERCIAL

## 2023-04-27 ENCOUNTER — HOSPITAL ENCOUNTER (INPATIENT)
Facility: HOSPITAL | Age: 33
LOS: 1 days | Discharge: HOME | DRG: 833 | End: 2023-04-28
Attending: OBSTETRICS & GYNECOLOGY | Admitting: OBSTETRICS & GYNECOLOGY
Payer: COMMERCIAL

## 2023-04-27 LAB
ABO/RH(D): ABNORMAL
ALBUMIN SERPL BCG-MCNC: 3.6 G/DL (ref 3.5–5.2)
ALP SERPL-CCNC: 73 U/L (ref 35–104)
ALT SERPL W P-5'-P-CCNC: 11 U/L (ref 10–35)
AMPHETAMINES UR QL SCN: ABNORMAL
ANION GAP SERPL CALCULATED.3IONS-SCNC: 15 MMOL/L (ref 7–15)
ANTIBODY ID: NORMAL
ANTIBODY SCREEN: POSITIVE
AST SERPL W P-5'-P-CCNC: 18 U/L (ref 10–35)
BARBITURATES UR QL SCN: ABNORMAL
BASOPHILS # BLD AUTO: 0 10E3/UL (ref 0–0.2)
BASOPHILS NFR BLD AUTO: 0 %
BENZODIAZ UR QL SCN: ABNORMAL
BILIRUB SERPL-MCNC: 0.2 MG/DL
BUN SERPL-MCNC: 8.9 MG/DL (ref 6–20)
BZE UR QL SCN: ABNORMAL
CALCIUM SERPL-MCNC: 9.3 MG/DL (ref 8.6–10)
CANNABINOIDS UR QL SCN: ABNORMAL
CHLORIDE SERPL-SCNC: 103 MMOL/L (ref 98–107)
CREAT SERPL-MCNC: 0.52 MG/DL (ref 0.51–0.95)
DEPRECATED HCO3 PLAS-SCNC: 19 MMOL/L (ref 22–29)
EOSINOPHIL # BLD AUTO: 0.1 10E3/UL (ref 0–0.7)
EOSINOPHIL NFR BLD AUTO: 1 %
ERYTHROCYTE [DISTWIDTH] IN BLOOD BY AUTOMATED COUNT: 12 % (ref 10–15)
GFR SERPL CREATININE-BSD FRML MDRD: >90 ML/MIN/1.73M2
GLUCOSE SERPL-MCNC: 111 MG/DL (ref 70–99)
HCT VFR BLD AUTO: 34.3 % (ref 35–47)
HGB BLD-MCNC: 11.4 G/DL (ref 11.7–15.7)
HOLD SPECIMEN: NORMAL
IMM GRANULOCYTES # BLD: 0.1 10E3/UL
IMM GRANULOCYTES NFR BLD: 1 %
LYMPHOCYTES # BLD AUTO: 2.3 10E3/UL (ref 0.8–5.3)
LYMPHOCYTES NFR BLD AUTO: 21 %
MCH RBC QN AUTO: 30.7 PG (ref 26.5–33)
MCHC RBC AUTO-ENTMCNC: 33.2 G/DL (ref 31.5–36.5)
MCV RBC AUTO: 93 FL (ref 78–100)
MONOCYTES # BLD AUTO: 0.6 10E3/UL (ref 0–1.3)
MONOCYTES NFR BLD AUTO: 6 %
NEUTROPHILS # BLD AUTO: 7.8 10E3/UL (ref 1.6–8.3)
NEUTROPHILS NFR BLD AUTO: 71 %
NRBC # BLD AUTO: 0 10E3/UL
NRBC BLD AUTO-RTO: 0 /100
OPIATES UR QL SCN: ABNORMAL
PCP QUAL URINE (ROCHE): ABNORMAL
PLATELET # BLD AUTO: 276 10E3/UL (ref 150–450)
POTASSIUM SERPL-SCNC: 3.7 MMOL/L (ref 3.4–5.3)
PROT SERPL-MCNC: 6.2 G/DL (ref 6.4–8.3)
RBC # BLD AUTO: 3.71 10E6/UL (ref 3.8–5.2)
SODIUM SERPL-SCNC: 137 MMOL/L (ref 136–145)
SPECIMEN EXPIRATION DATE: ABNORMAL
SPECIMEN EXPIRATION DATE: NORMAL
WBC # BLD AUTO: 11 10E3/UL (ref 4–11)

## 2023-04-27 PROCEDURE — 250N000013 HC RX MED GY IP 250 OP 250 PS 637: Performed by: OBSTETRICS & GYNECOLOGY

## 2023-04-27 PROCEDURE — 36415 COLL VENOUS BLD VENIPUNCTURE: CPT | Performed by: OBSTETRICS & GYNECOLOGY

## 2023-04-27 PROCEDURE — 258N000003 HC RX IP 258 OP 636: Performed by: OBSTETRICS & GYNECOLOGY

## 2023-04-27 PROCEDURE — 80053 COMPREHEN METABOLIC PANEL: CPT | Performed by: OBSTETRICS & GYNECOLOGY

## 2023-04-27 PROCEDURE — 96372 THER/PROPH/DIAG INJ SC/IM: CPT | Performed by: OBSTETRICS & GYNECOLOGY

## 2023-04-27 PROCEDURE — 86870 RBC ANTIBODY IDENTIFICATION: CPT | Performed by: OBSTETRICS & GYNECOLOGY

## 2023-04-27 PROCEDURE — 250N000011 HC RX IP 250 OP 636: Performed by: OBSTETRICS & GYNECOLOGY

## 2023-04-27 PROCEDURE — 120N000001 HC R&B MED SURG/OB

## 2023-04-27 PROCEDURE — 76819 FETAL BIOPHYS PROFIL W/O NST: CPT

## 2023-04-27 PROCEDURE — 85025 COMPLETE CBC W/AUTO DIFF WBC: CPT | Performed by: OBSTETRICS & GYNECOLOGY

## 2023-04-27 PROCEDURE — 80307 DRUG TEST PRSMV CHEM ANLYZR: CPT | Performed by: OBSTETRICS & GYNECOLOGY

## 2023-04-27 PROCEDURE — 86850 RBC ANTIBODY SCREEN: CPT | Performed by: OBSTETRICS & GYNECOLOGY

## 2023-04-27 RX ORDER — MAGNESIUM SULFATE 4 G/50ML
4 INJECTION INTRAVENOUS ONCE
Status: COMPLETED | OUTPATIENT
Start: 2023-04-27 | End: 2023-04-27

## 2023-04-27 RX ORDER — ALBUTEROL SULFATE 90 UG/1
2 AEROSOL, METERED RESPIRATORY (INHALATION) EVERY 6 HOURS PRN
Status: DISCONTINUED | OUTPATIENT
Start: 2023-04-27 | End: 2023-04-28 | Stop reason: HOSPADM

## 2023-04-27 RX ORDER — CALCIUM GLUCONATE 94 MG/ML
1 INJECTION, SOLUTION INTRAVENOUS
Status: DISCONTINUED | OUTPATIENT
Start: 2023-04-27 | End: 2023-04-28 | Stop reason: HOSPADM

## 2023-04-27 RX ORDER — ONDANSETRON 2 MG/ML
4 INJECTION INTRAMUSCULAR; INTRAVENOUS EVERY 6 HOURS PRN
Status: DISCONTINUED | OUTPATIENT
Start: 2023-04-27 | End: 2023-04-28 | Stop reason: HOSPADM

## 2023-04-27 RX ORDER — METOCLOPRAMIDE HYDROCHLORIDE 5 MG/ML
10 INJECTION INTRAMUSCULAR; INTRAVENOUS EVERY 6 HOURS PRN
Status: DISCONTINUED | OUTPATIENT
Start: 2023-04-27 | End: 2023-04-28 | Stop reason: HOSPADM

## 2023-04-27 RX ORDER — ACETAMINOPHEN 325 MG/1
650 TABLET ORAL EVERY 4 HOURS PRN
Status: DISCONTINUED | OUTPATIENT
Start: 2023-04-27 | End: 2023-04-28 | Stop reason: HOSPADM

## 2023-04-27 RX ORDER — BUTALBITAL/ACETAMINOPHEN 50MG-325MG
1 TABLET ORAL
COMMUNITY
Start: 2023-01-20

## 2023-04-27 RX ORDER — HYDROXYZINE HYDROCHLORIDE 25 MG/1
25 TABLET, FILM COATED ORAL EVERY 6 HOURS PRN
Status: DISCONTINUED | OUTPATIENT
Start: 2023-04-27 | End: 2023-04-28 | Stop reason: HOSPADM

## 2023-04-27 RX ORDER — MAGNESIUM SULFATE IN WATER 40 MG/ML
2 INJECTION, SOLUTION INTRAVENOUS CONTINUOUS
Status: DISCONTINUED | OUTPATIENT
Start: 2023-04-27 | End: 2023-04-28

## 2023-04-27 RX ORDER — TERBUTALINE SULFATE 1 MG/ML
0.25 INJECTION, SOLUTION SUBCUTANEOUS ONCE
Status: COMPLETED | OUTPATIENT
Start: 2023-04-27 | End: 2023-04-27

## 2023-04-27 RX ORDER — INDOMETHACIN 25 MG/1
25 CAPSULE ORAL EVERY 6 HOURS
Status: DISCONTINUED | OUTPATIENT
Start: 2023-04-27 | End: 2023-04-28 | Stop reason: HOSPADM

## 2023-04-27 RX ORDER — LIDOCAINE 40 MG/G
CREAM TOPICAL
Status: DISCONTINUED | OUTPATIENT
Start: 2023-04-27 | End: 2023-04-27 | Stop reason: HOSPADM

## 2023-04-27 RX ORDER — ONDANSETRON 4 MG/1
2 TABLET, FILM COATED ORAL
COMMUNITY
Start: 2023-04-08 | End: 2023-09-13

## 2023-04-27 RX ORDER — PROCHLORPERAZINE 25 MG
25 SUPPOSITORY, RECTAL RECTAL EVERY 12 HOURS PRN
Status: DISCONTINUED | OUTPATIENT
Start: 2023-04-27 | End: 2023-04-28 | Stop reason: HOSPADM

## 2023-04-27 RX ORDER — SODIUM CHLORIDE, SODIUM LACTATE, POTASSIUM CHLORIDE, CALCIUM CHLORIDE 600; 310; 30; 20 MG/100ML; MG/100ML; MG/100ML; MG/100ML
INJECTION, SOLUTION INTRAVENOUS CONTINUOUS
Status: DISCONTINUED | OUTPATIENT
Start: 2023-04-27 | End: 2023-04-28 | Stop reason: HOSPADM

## 2023-04-27 RX ORDER — ONDANSETRON 4 MG/1
4 TABLET, ORALLY DISINTEGRATING ORAL EVERY 6 HOURS PRN
Status: DISCONTINUED | OUTPATIENT
Start: 2023-04-27 | End: 2023-04-28 | Stop reason: HOSPADM

## 2023-04-27 RX ORDER — CEFAZOLIN SODIUM 2 G/100ML
2 INJECTION, SOLUTION INTRAVENOUS ONCE
Status: COMPLETED | OUTPATIENT
Start: 2023-04-27 | End: 2023-04-27

## 2023-04-27 RX ORDER — MAGNESIUM SULFATE HEPTAHYDRATE 40 MG/ML
2 INJECTION, SOLUTION INTRAVENOUS ONCE
Status: COMPLETED | OUTPATIENT
Start: 2023-04-27 | End: 2023-04-27

## 2023-04-27 RX ORDER — CEFAZOLIN SODIUM 1 G/3ML
1 INJECTION, POWDER, FOR SOLUTION INTRAMUSCULAR; INTRAVENOUS EVERY 8 HOURS
Status: DISCONTINUED | OUTPATIENT
Start: 2023-04-27 | End: 2023-04-27

## 2023-04-27 RX ORDER — INDOMETHACIN 25 MG/1
50 CAPSULE ORAL ONCE
Status: COMPLETED | OUTPATIENT
Start: 2023-04-27 | End: 2023-04-27

## 2023-04-27 RX ORDER — METOCLOPRAMIDE 10 MG/1
10 TABLET ORAL EVERY 6 HOURS PRN
Status: DISCONTINUED | OUTPATIENT
Start: 2023-04-27 | End: 2023-04-28 | Stop reason: HOSPADM

## 2023-04-27 RX ORDER — HYDROXYZINE HYDROCHLORIDE 50 MG/1
50 TABLET, FILM COATED ORAL
Status: DISCONTINUED | OUTPATIENT
Start: 2023-04-27 | End: 2023-04-28 | Stop reason: HOSPADM

## 2023-04-27 RX ORDER — PROCHLORPERAZINE MALEATE 10 MG
10 TABLET ORAL EVERY 6 HOURS PRN
Status: DISCONTINUED | OUTPATIENT
Start: 2023-04-27 | End: 2023-04-28 | Stop reason: HOSPADM

## 2023-04-27 RX ORDER — SODIUM CHLORIDE, SODIUM LACTATE, POTASSIUM CHLORIDE, CALCIUM CHLORIDE 600; 310; 30; 20 MG/100ML; MG/100ML; MG/100ML; MG/100ML
INJECTION, SOLUTION INTRAVENOUS CONTINUOUS
Status: DISCONTINUED | OUTPATIENT
Start: 2023-04-27 | End: 2023-04-27

## 2023-04-27 RX ADMIN — ALBUTEROL SULFATE 2 PUFF: 90 AEROSOL, METERED RESPIRATORY (INHALATION) at 13:17

## 2023-04-27 RX ADMIN — SODIUM CHLORIDE, POTASSIUM CHLORIDE, SODIUM LACTATE AND CALCIUM CHLORIDE 1000 ML: 600; 310; 30; 20 INJECTION, SOLUTION INTRAVENOUS at 09:47

## 2023-04-27 RX ADMIN — SODIUM CHLORIDE, POTASSIUM CHLORIDE, SODIUM LACTATE AND CALCIUM CHLORIDE: 600; 310; 30; 20 INJECTION, SOLUTION INTRAVENOUS at 13:00

## 2023-04-27 RX ADMIN — HYDROXYZINE HYDROCHLORIDE 50 MG: 50 TABLET, FILM COATED ORAL at 23:47

## 2023-04-27 RX ADMIN — MAGNESIUM SULFATE HEPTAHYDRATE 4 G: 80 INJECTION, SOLUTION INTRAVENOUS at 10:21

## 2023-04-27 RX ADMIN — METOCLOPRAMIDE 10 MG: 5 INJECTION, SOLUTION INTRAMUSCULAR; INTRAVENOUS at 15:46

## 2023-04-27 RX ADMIN — MAGNESIUM SULFATE HEPTAHYDRATE 2 G: 40 INJECTION, SOLUTION INTRAVENOUS at 10:38

## 2023-04-27 RX ADMIN — INDOMETHACIN 25 MG: 25 CAPSULE ORAL at 17:02

## 2023-04-27 RX ADMIN — HYDROXYZINE HYDROCHLORIDE 25 MG: 25 TABLET, FILM COATED ORAL at 13:16

## 2023-04-27 RX ADMIN — CEFAZOLIN SODIUM 2 G: 2 INJECTION, SOLUTION INTRAVENOUS at 14:15

## 2023-04-27 RX ADMIN — INDOMETHACIN 25 MG: 25 CAPSULE ORAL at 23:15

## 2023-04-27 RX ADMIN — MAGNESIUM SULFATE HEPTAHYDRATE 2 G/HR: 40 INJECTION, SOLUTION INTRAVENOUS at 11:07

## 2023-04-27 RX ADMIN — INDOMETHACIN 50 MG: 25 CAPSULE ORAL at 10:39

## 2023-04-27 RX ADMIN — TERBUTALINE SULFATE 0.25 MG: 1 INJECTION, SOLUTION SUBCUTANEOUS at 10:12

## 2023-04-27 RX ADMIN — TERBUTALINE SULFATE 0.25 MG: 1 INJECTION, SOLUTION SUBCUTANEOUS at 09:47

## 2023-04-27 ASSESSMENT — ACTIVITIES OF DAILY LIVING (ADL)
TOILETING_ISSUES: NO
FALL_HISTORY_WITHIN_LAST_SIX_MONTHS: NO
CONCENTRATING,_REMEMBERING_OR_MAKING_DECISIONS_DIFFICULTY: NO
ADLS_ACUITY_SCORE: 18
ADLS_ACUITY_SCORE: 31
DIFFICULTY_COMMUNICATING: NO
DIFFICULTY_EATING/SWALLOWING: NO
ADLS_ACUITY_SCORE: 18
ADLS_ACUITY_SCORE: 35
HEARING_DIFFICULTY_OR_DEAF: NO
ADLS_ACUITY_SCORE: 35
DOING_ERRANDS_INDEPENDENTLY_DIFFICULTY: NO
ADLS_ACUITY_SCORE: 18
WALKING_OR_CLIMBING_STAIRS_DIFFICULTY: NO
WEAR_GLASSES_OR_BLIND: NO
DRESSING/BATHING_DIFFICULTY: NO

## 2023-04-27 NOTE — PROGRESS NOTES
Baby A and B are very tricky to find/keep on the monitor. Used ultrasound to verify heart beat seen on both babies at 1014 and 1027

## 2023-04-27 NOTE — PROGRESS NOTES
Patient requesting to remove yanez catheter since it is uncomfortable., initially wanted it in but requesting to remove it.

## 2023-04-27 NOTE — PROGRESS NOTES
Late Entry    Called Minerva March to have her update Rosa patient is here, gopi and appears to be very uncomfortable.  Dr. Lee and Minerva March to place orders.

## 2023-04-27 NOTE — PROGRESS NOTES
Patient felt she was a little short of breath for a second. Reflexes normal. Oxygen 100 % resp 20 and lung sound clear.  Patient states she thinks she could be anxious too.  Talked to patient about her feelings and had her take some deep breaths.  She was feeling much better

## 2023-04-27 NOTE — PROGRESS NOTES
Updated Dr. Lee on patient's US/BPP results. Patient wanted yanez catheter out so writer did remove it.  Dr. Lee updated patient bp's still running in 90's systolic , Twins are super hard to keep on the EFM monitor. Updated she is still nauseated.

## 2023-04-27 NOTE — PROGRESS NOTES
Late entry r/t patient care:     Verified Vy SHERIFF's magnesium infusion rates for administration at 1021,1038 and 1107
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
## 2023-04-28 VITALS
BODY MASS INDEX: 25.39 KG/M2 | RESPIRATION RATE: 18 BRPM | DIASTOLIC BLOOD PRESSURE: 62 MMHG | WEIGHT: 158 LBS | HEIGHT: 66 IN | HEART RATE: 110 BPM | SYSTOLIC BLOOD PRESSURE: 105 MMHG | OXYGEN SATURATION: 100 % | TEMPERATURE: 98.1 F

## 2023-04-28 PROCEDURE — 258N000003 HC RX IP 258 OP 636: Performed by: OBSTETRICS & GYNECOLOGY

## 2023-04-28 PROCEDURE — 250N000013 HC RX MED GY IP 250 OP 250 PS 637: Performed by: OBSTETRICS & GYNECOLOGY

## 2023-04-28 PROCEDURE — 250N000011 HC RX IP 250 OP 636: Performed by: OBSTETRICS & GYNECOLOGY

## 2023-04-28 RX ORDER — MAGNESIUM SULFATE IN WATER 40 MG/ML
1 INJECTION, SOLUTION INTRAVENOUS CONTINUOUS
Status: DISCONTINUED | OUTPATIENT
Start: 2023-04-28 | End: 2023-04-28 | Stop reason: HOSPADM

## 2023-04-28 RX ADMIN — INDOMETHACIN 25 MG: 25 CAPSULE ORAL at 12:46

## 2023-04-28 RX ADMIN — INDOMETHACIN 25 MG: 25 CAPSULE ORAL at 05:22

## 2023-04-28 RX ADMIN — SODIUM CHLORIDE, POTASSIUM CHLORIDE, SODIUM LACTATE AND CALCIUM CHLORIDE: 600; 310; 30; 20 INJECTION, SOLUTION INTRAVENOUS at 03:49

## 2023-04-28 RX ADMIN — MAGNESIUM SULFATE HEPTAHYDRATE 2 G/HR: 40 INJECTION, SOLUTION INTRAVENOUS at 07:14

## 2023-04-28 RX ADMIN — ACETAMINOPHEN 650 MG: 325 TABLET ORAL at 05:32

## 2023-04-28 ASSESSMENT — ACTIVITIES OF DAILY LIVING (ADL)
ADLS_ACUITY_SCORE: 18

## 2023-04-28 NOTE — PROGRESS NOTES
Dr. Lee at bedside. Pt okay to discharge home. Pt verbalizes understanding of discharge instructions and follow up plan of care. Pt has appt on Monday May 1st in clinic.

## 2023-04-28 NOTE — PROGRESS NOTES
RN updated Dr Lee per pt request. Order from MD to turn off mag infusion and pt can be off continuous monitoring. Dr Lee will assess pt at bedside this evening after clinic.
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
## 2023-04-28 NOTE — PROGRESS NOTES
RN was called to bedside at approx 1615 by patient's mother. RN at bedside and observed pt to be upset. Pt verbalizes she was upset after talking with . Pt's use of medical marijuana was brought up in discussion with social work and pt was upset about social work opening a CPS case.   This RN and Charge RN Shoshana SCOTT RN at bedside providing emotional support and education. Rn called and updated Dr. Lee who will speak with patient at bedside this evening.

## 2023-04-28 NOTE — PROGRESS NOTES
[facility] Labor and Delivery Progress Note    Kary Sebastian MRN# [id number]   Age: 32 year old YOB: 1990           Subjective:   Patient is tolerating mag, denies contractions, endorses a lot of FM, ,denies loss of fluid or bleeding, expressed desire to go home later if able, plans to stay with her mom           Objective:   Alert. O x 3  CV regular  Resp non labored  Ab gravid, soft  SVE deferred      Cervical Exam: 2 / 70% / -2       Membranes: Intact     Fetal Heart Rate:    Monitor: external US    Variability: minimal (detectable, amplitude less than or equal to 5 bpm)    Baseline Rate: normal range    Fetal Heart Rate Tracing: category 1 x 2 appropriate for gestational age        Assessment:   Kary Sebastian is a 32 year old  who is 27w5d here with twin gestation and  labor stable on mag and indocin, s/p betamethasone, plan to taper off magnesium today          Plan:   Continue in house surveillance, has SW consult today, defer discharge planning to Dr. Lee, primary OB.      Annamaria Oshea MD

## 2023-04-28 NOTE — PROGRESS NOTES
OB NOTE    Stable during the day  Babies very active, difficult to monitor continuously  Cx:2/70/-2  Continue Mg and Indocin 24 hours  Reviewed social situation with patient  Plan  consult in am  Twins 27+5  Pema/janes Lee MD

## 2023-04-28 NOTE — PROGRESS NOTES
Report received from Yolie Armando RN and Arielle NARVAEZ RN at approx 1215. This Rn at bedside at 1220 to adjust monitors. RN noted external monitors to be switched. US A was tracing B and US B was tracing A. RN called previous RN to bedside to clarify. Monitors adjusted and now tracing correctly.

## 2023-04-28 NOTE — PLAN OF CARE
VSS. Patient able to rest throughout night. Plan to monitor babies during the day. Patient declines bleeding, LOF, and reports good fetal movement. Magnesium continues to infuse and PO indocin being given.   Goal: Absence of Hospital-Acquired Illness or Injury  2023 0744 by Arlene Crum RN  Outcome: Progressing  2023 0743 by Arlene Crum RN  Outcome: Progressing  Intervention: Prevent and Manage VTE (Venous Thromboembolism) Risk  Recent Flowsheet Documentation  Taken 2023 0350 by Arlene Crum RN  VTE Prevention/Management: patient refused intervention  Taken 2023 3489 by Arlene Crum RN  VTE Prevention/Management: patient refused intervention  Goal: Optimal Comfort and Wellbeing  2023 0744 by Arlene Crum RN  Outcome: Progressing  2023 07 by Arlene Crum RN  Outcome: Progressing  Goal: Readiness for Transition of Care  2023 0744 by Arlene Crum RN  Outcome: Progressing  2023 07 by Arlene Crum RN  Outcome: Progressing     Problem:  Labor  Goal: Delayed  Delivery  2023 07 by Arlene Crum RN  Outcome: Progressing  2023 by Arlene Crum RN  Outcome: Progressing   Goal Outcome Evaluation:

## 2023-04-28 NOTE — PROGRESS NOTES
Report given to REEMA Mccord. Kam Grant from  called to ask when patient would like consult. Suri will ask Brynn and notify EDI.

## 2023-04-28 NOTE — PLAN OF CARE
Problem:  Labor  Goal: Delayed  Delivery  Outcome: Progressing   Goal Outcome Evaluation:                    Patient very emotional this shift.  Stressed about things in life, especially social situation. Contractions palpate mild.  Patient able to sleep through them, but when awake rated them 6/10 earlier in shift.  When Dr Lee here to discuss plan of care with patient patient reported irregular mild contractions.  Order to be able to have off the monitor overnight to facilitate sleep. Will attempt to monitor again int he morning

## 2023-04-28 NOTE — DISCHARGE INSTRUCTIONS
Discharge Instruction for Undelivered Patients      You were seen for:  Labor   We Consulted: Dr. Lee      Call your provider if you notice:  Swelling in your face or increased swelling in your hands or legs.  Headaches that are not relieved by Tylenol (acetaminophen).  Changes in your vision (blurring: seeing spots or stars.)  Nausea (sick to your stomach) and vomiting (throwing up).   Weight gain of 5 pounds or more per week.  Heartburn that doesn't go away.  Signs of bladder infection: pain when you urinate (use the toilet), need to go more often and more urgently.  The bag of marquez (rupture of membranes) breaks, or you notice leaking in your underwear.  Bright red blood in your underwear.  Abdominal (lower belly) or stomach pain.  Second (plus) baby: Contractions (tightening) less than 10 minutes apart and getting stronger.  *If less than 34 weeks: Contractions (tightening) more than 6 times in one hour.  Increase or change in vaginal discharge (note the color and amount)      Follow-up:  Follow up with regularly scheduled appointments.

## 2023-04-28 NOTE — PROGRESS NOTES
Spoke with Dr. Lee to discuss patient status and evaluate plan of care. MD notified that patient has not felt any contractions since the 2 contractions she felt overnight. MD requested mag decreased to 1g/hr starting now until he is able to reassess in person this evening as IHOB.

## 2023-05-01 ENCOUNTER — LAB REQUISITION (OUTPATIENT)
Dept: LAB | Facility: CLINIC | Age: 33
End: 2023-05-01

## 2023-05-01 DIAGNOSIS — Z11.3 ENCOUNTER FOR SCREENING FOR INFECTIONS WITH A PREDOMINANTLY SEXUAL MODE OF TRANSMISSION: ICD-10-CM

## 2023-05-01 PROCEDURE — 86780 TREPONEMA PALLIDUM: CPT | Performed by: NURSE PRACTITIONER

## 2023-05-02 LAB — T PALLIDUM AB SER QL: NONREACTIVE

## 2023-05-15 NOTE — DISCHARGE SUMMARY
ANTEPARTUM DISCHARGE SUMMARY    Patient Name: Kary Sebastian   YOB: 1990   Medical Record Number: [id number]     Primary Physician: Ashlee Drake     Admission Date: 2023   Discharge date: 2023    Gestational age at discharge: 30w1d    Reason for admission:    labor     Diagnosis:   IUP at 30 weeks   Twins  PTL    Procedures completed while in hospital:  None    Diagnostic imaging:  [unfilled]    Consults:      HOSPITAL COURSE:   Kary Sebastian is a 32 year old  female,  whose Estimated Date of Delivery: 2023.     Patient was admitted at 30 weeks gestation for  labor. Twins    Today patient is 30w1d  gestation. States she feels well and is ready for discharge.  Patient reports no new complaints. She reports good fetal movement today. She denies contractions, cramping, pelvic pressure, backache.  She denies vaginal bleeding and denies vaginal discharge.  She is voiding without difficulty. Has a normal appetite, is tolerating a general diet, and denies constipation. She denies headache, blurred vision, shortness of breath, chest pain or epigastric pain, nausea, and pain in her lower extremities.     She will be discharged today to home in good condition.     Recommendations going forward include Follow-up 1 week.       DISPOSITION:   Home     DISCHARGE CONDITION: Good/Stable     DISCHARGE MEDICATIONS:      Review of your medicines      CONTINUE these medicines which have NOT CHANGED      Dose / Directions   * albuterol 108 (90 Base) MCG/ACT inhaler  Commonly known as: PROAIR HFA/PROVENTIL HFA/VENTOLIN HFA      Dose: 2 puff  Inhale 2 puffs into the lungs every 4 hours  Refills: 0     * albuterol (2.5 MG/3ML) 0.083% neb solution  Commonly known as: PROVENTIL      Dose: 2.5 mg  Inhale 2.5 mg into the lungs  Refills: 0     Butalbital-Acetaminophen  MG Tabs per tablet  Commonly known as: PHRENILIN      Dose: 1 tablet  Take 1 tablet  by mouth 2 times daily  Refills: 0     medical cannabis  (Patient's own supply)      See Admin Instructions (The purpose of this order is to document that the patient reports taking medical cannabis.  This is not a prescription, and is not used to certify that the patient has a qualifying medical condition.)  Refills: 0     ondansetron 4 MG tablet  Commonly known as: ZOFRAN      Dose: 2 tablet  Take 2 tablets by mouth 2 times daily  Refills: 0     simethicone 80 MG chewable tablet  Commonly known as: MYLICON  Used for: Acid indigestion      Dose: 80 mg  Take 1 tablet (80 mg) by mouth every 6 hours as needed for flatulence or cramping  Quantity: 30 tablet  Refills: 0         * This list has 2 medication(s) that are the same as other medications prescribed for you. Read the directions carefully, and ask your doctor or other care provider to review them with you.            STOP taking    amphetamine-dextroamphetamine 5 MG tablet  Commonly known as: ADDERALL        cholecalciferol 50 MCG (2000 UT) Caps        escitalopram 10 MG tablet  Commonly known as: LEXAPRO        fluticasone-salmeterol 250-50 MCG/ACT inhaler  Commonly known as: ADVAIR                   DISCHARGE PLAN:   - Follow up with Dr Lee, in 1 weeks  - Take medication as prescribed   - Physical activity: As directed per provider  - Diet: Regular   - Medication: Please see MAR   - Warning signs discussed with patient about when to call the clinic/hospital   - Reviewed signs and symptoms of  labor.  - All questions and concerns were answered for the patient prior to discharge.     Geremias Lee MD     I saw the patient on the date of discharge   Total time spent for discharge on date of discharge: 20 minutes     Physician(s) in addition to primary physician who should receive a copy:   CC: Ashlee Drake. Geremias Lee MD  Female Pelvic Medicine and Reconstructive Surgery
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
## 2023-05-17 ENCOUNTER — OFFICE VISIT (OUTPATIENT)
Dept: OTOLARYNGOLOGY | Facility: CLINIC | Age: 33
End: 2023-05-17
Payer: COMMERCIAL

## 2023-05-17 ENCOUNTER — OFFICE VISIT (OUTPATIENT)
Dept: AUDIOLOGY | Facility: CLINIC | Age: 33
End: 2023-05-17
Payer: COMMERCIAL

## 2023-05-17 ENCOUNTER — MEDICAL CORRESPONDENCE (OUTPATIENT)
Dept: HEALTH INFORMATION MANAGEMENT | Facility: CLINIC | Age: 33
End: 2023-05-17

## 2023-05-17 DIAGNOSIS — H69.90 DYSFUNCTION OF EUSTACHIAN TUBE, UNSPECIFIED LATERALITY: Primary | ICD-10-CM

## 2023-05-17 DIAGNOSIS — Z01.10 NORMAL HEARING TEST OF BOTH EARS: ICD-10-CM

## 2023-05-17 DIAGNOSIS — H69.90 DYSFUNCTION OF EUSTACHIAN TUBE, UNSPECIFIED LATERALITY: ICD-10-CM

## 2023-05-17 DIAGNOSIS — H93.13 TINNITUS OF BOTH EARS: Primary | ICD-10-CM

## 2023-05-17 DIAGNOSIS — H91.93 HEARING DIFFICULTY OF BOTH EARS: Primary | ICD-10-CM

## 2023-05-17 DIAGNOSIS — H93.299: ICD-10-CM

## 2023-05-17 DIAGNOSIS — F45.8 BRUXISM: ICD-10-CM

## 2023-05-17 DIAGNOSIS — M26.622 ARTHRALGIA OF LEFT TEMPOROMANDIBULAR JOINT: ICD-10-CM

## 2023-05-17 DIAGNOSIS — H92.03 OTALGIA OF BOTH EARS: ICD-10-CM

## 2023-05-17 PROCEDURE — 92550 TYMPANOMETRY & REFLEX THRESH: CPT | Performed by: AUDIOLOGIST

## 2023-05-17 PROCEDURE — 99203 OFFICE O/P NEW LOW 30 MIN: CPT | Performed by: OTOLARYNGOLOGY

## 2023-05-17 PROCEDURE — 92557 COMPREHENSIVE HEARING TEST: CPT | Performed by: AUDIOLOGIST

## 2023-05-17 RX ORDER — ESCITALOPRAM OXALATE 5 MG/1
1 TABLET ORAL
Status: ON HOLD | COMMUNITY
Start: 2022-06-20 | End: 2023-06-14
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
## 2023-05-23 ENCOUNTER — HOSPITAL ENCOUNTER (OUTPATIENT)
Facility: HOSPITAL | Age: 33
End: 2023-05-23
Admitting: OBSTETRICS & GYNECOLOGY
Payer: COMMERCIAL

## 2023-05-23 ENCOUNTER — HOSPITAL ENCOUNTER (OUTPATIENT)
Facility: HOSPITAL | Age: 33
Discharge: HOME | End: 2023-05-23
Attending: OBSTETRICS & GYNECOLOGY | Admitting: OBSTETRICS & GYNECOLOGY
Payer: COMMERCIAL

## 2023-05-23 VITALS — HEIGHT: 66 IN | WEIGHT: 162 LBS | BODY MASS INDEX: 26.03 KG/M2

## 2023-05-23 PROCEDURE — 250N000011 HC RX IP 250 OP 636: Performed by: OBSTETRICS & GYNECOLOGY

## 2023-05-23 PROCEDURE — 250N000011 HC RX IP 250 OP 636

## 2023-05-23 PROCEDURE — 250N000013 HC RX MED GY IP 250 OP 250 PS 637: Performed by: OBSTETRICS & GYNECOLOGY

## 2023-05-23 PROCEDURE — 258N000003 HC RX IP 258 OP 636: Performed by: STUDENT IN AN ORGANIZED HEALTH CARE EDUCATION/TRAINING PROGRAM

## 2023-05-23 PROCEDURE — G0463 HOSPITAL OUTPT CLINIC VISIT: HCPCS

## 2023-05-23 PROCEDURE — 96372 THER/PROPH/DIAG INJ SC/IM: CPT | Performed by: OBSTETRICS & GYNECOLOGY

## 2023-05-23 RX ORDER — ONDANSETRON 2 MG/ML
4 INJECTION INTRAMUSCULAR; INTRAVENOUS EVERY 6 HOURS PRN
Status: DISCONTINUED | OUTPATIENT
Start: 2023-05-23 | End: 2023-05-23 | Stop reason: HOSPADM

## 2023-05-23 RX ORDER — ONDANSETRON 2 MG/ML
INJECTION INTRAMUSCULAR; INTRAVENOUS
Status: COMPLETED
Start: 2023-05-23 | End: 2023-05-23

## 2023-05-23 RX ORDER — HYDROXYZINE HYDROCHLORIDE 50 MG/1
50 TABLET, FILM COATED ORAL
Status: COMPLETED | OUTPATIENT
Start: 2023-05-23 | End: 2023-05-23

## 2023-05-23 RX ORDER — FAMOTIDINE 10 MG
10 TABLET ORAL 2 TIMES DAILY
COMMUNITY
End: 2023-09-13

## 2023-05-23 RX ORDER — SODIUM CHLORIDE, SODIUM LACTATE, POTASSIUM CHLORIDE, CALCIUM CHLORIDE 600; 310; 30; 20 MG/100ML; MG/100ML; MG/100ML; MG/100ML
INJECTION, SOLUTION INTRAVENOUS CONTINUOUS
Status: DISCONTINUED | OUTPATIENT
Start: 2023-05-23 | End: 2023-05-23 | Stop reason: HOSPADM

## 2023-05-23 RX ORDER — LIDOCAINE 40 MG/G
CREAM TOPICAL
Status: DISCONTINUED | OUTPATIENT
Start: 2023-05-23 | End: 2023-05-23 | Stop reason: HOSPADM

## 2023-05-23 RX ORDER — BETAMETHASONE SODIUM PHOSPHATE AND BETAMETHASONE ACETATE 3; 3 MG/ML; MG/ML
12 INJECTION, SUSPENSION INTRA-ARTICULAR; INTRALESIONAL; INTRAMUSCULAR; SOFT TISSUE ONCE
Status: COMPLETED | OUTPATIENT
Start: 2023-05-23 | End: 2023-05-23

## 2023-05-23 RX ADMIN — BETAMETHASONE SODIUM PHOSPHATE AND BETAMETHASONE ACETATE 12 MG: 3; 3 INJECTION, SUSPENSION INTRA-ARTICULAR; INTRALESIONAL; INTRAMUSCULAR at 14:33

## 2023-05-23 RX ADMIN — ONDANSETRON 4 MG: 2 INJECTION INTRAMUSCULAR; INTRAVENOUS at 14:51

## 2023-05-23 RX ADMIN — HYDROXYZINE HYDROCHLORIDE 50 MG: 50 TABLET, FILM COATED ORAL at 14:33

## 2023-05-23 RX ADMIN — SODIUM CHLORIDE, POTASSIUM CHLORIDE, SODIUM LACTATE AND CALCIUM CHLORIDE: 600; 310; 30; 20 INJECTION, SOLUTION INTRAVENOUS at 13:11

## 2023-05-23 ASSESSMENT — ACTIVITIES OF DAILY LIVING (ADL)
ADLS_ACUITY_SCORE: 35
ADLS_ACUITY_SCORE: 35
CHANGE_IN_FUNCTIONAL_STATUS_SINCE_ONSET_OF_CURRENT_ILLNESS/INJURY: NO
DRESSING/BATHING_DIFFICULTY: NO
DRESS: 0-->INDEPENDENT
DRESS: 0-->INDEPENDENT
DOING_ERRANDS_INDEPENDENTLY_DIFFICULTY: NO
ADLS_ACUITY_SCORE: 15
WALKING_OR_CLIMBING_STAIRS_DIFFICULTY: NO
BATHING: 0-->INDEPENDENT
FALL_HISTORY_WITHIN_LAST_SIX_MONTHS: NO
DIFFICULTY_EATING/SWALLOWING: NO
CONCENTRATING,_REMEMBERING_OR_MAKING_DECISIONS_DIFFICULTY: NO
WEAR_GLASSES_OR_BLIND: NO
TOILETING_ISSUES: NO

## 2023-05-23 NOTE — PROGRESS NOTES
Brynn doing well.  Pain is decreased after vistaril and she has been able to sleep.  Will continue to monitor.

## 2023-05-23 NOTE — PROGRESS NOTES
Dr. Lee paged around 1235 and again at 1253, when we spoke and reviewed tracings.  Fetus A - moderate variability, occasional variable decels, 15x15 accelerations present.  Fetus B - moderate variability with acclerations, occasional variable decels, and then a prolonged decel at 1252 lasting 4 minutes with a will in the 90s.  Writer to bedside at 1253.  Patient repositioned, second RN Eleanor Lu to bedside for assistance.  Dr. Lee given quick telephone update and then phone call handed off to Dr. Cortse - [facility] and in department, so writer could get back to the bedside.  We had a difficult time finding baby B's HR - Dr. Cortes to bedside to US.  Both FHRs visualized in a normal range - See Dr. Cortes's note - and we were able to get both babies back on the monitor. PIV started - IV fluid bolus started.    Telephone call with Dr. Lee at 1410.  Reviewed tracings.  Orders received for 50mg of vistaril PRN for back pain and discomfort.  Order received for rescue dose of betamethasone.     Plan to continue to monitor babies at this time.  Dr. Lee will come around 1700 to evaluate in person and come up with a long term plan, unless notified sooner by nursing with any concerns.

## 2023-05-23 NOTE — PROGRESS NOTES
Data: Patient assessed in the [facility] for fetal assessments. Cervix 3.5 cm dilated which is unchanged from previous exams. Membranes intact. Contractions are present. Contactions are occasional and last  seconds. Uterine assessment is mild by palpation during contractions and soft by palpation at rest. See flowsheets for fetal assessment documentation.     Action: Presumed adequate fetal oxygenation documented. Discharge instructions reviewed. Patient instructed to report change in fetal movement, vaginal leaking of fluid or bleeding, abdominal pain, or any concerns related to the pregnancy to provider/clinic.      Response: Orders to discharge home per Rosa. Patient verbalized understanding of education and agreement with plan. Discharged to home at 1830.

## 2023-05-23 NOTE — PROGRESS NOTES
Called to bedside as IHOB to assess FHT's after deceleration of fetus B.  2 separate heart rates visualized and both babies and both visually appropriate.  We were able to get both babies back on the monitor.  SVE done as patient visibly uncomfortable.  Cervix 3-4/20-2, no presenting part.    MD Irene

## 2023-05-23 NOTE — PROGRESS NOTES
Dr. Lee in department.  Reviewed tracing from patient's stay. EFM category 1 with accels for both babies over last hour.  Patient reports vistaril helped with discomfort from occasional contractions and she is coping well.  Plan to discharge home with follow-up US and appointment on Friday, or sooner if patient has any concerns.  Brynn feels comfortable with that plan.

## 2023-05-23 NOTE — PROGRESS NOTES
Data: Patient presented to [facility]: 2023 11:26 AM.  Reason for maternal/fetal assessment is non-stress tests. Patient reports normal fetal movement but more from baby B. Patient denies leaking of vaginal fluid/rupture of membranes, vaginal bleeding. Patient reports fetal movement is normal. Patient is a 31w2d .  Prenatal record reviewed. Pregnancy has been complicated by multiple gestation.    Vital signs wnl. Support person is not present.     Action: Verbal consent for EFM. Triage assessment completed.     Response: Patient verbalized agreement with plan. Will contact Dr. Lee with update and further orders.

## 2023-05-23 NOTE — DISCHARGE INSTRUCTIONS
Discharge Instruction for Undelivered Patients      You were seen for: Fetal Assessment  We Consulted: Dr. Lee  You had (Test or Medicine):Continuous fetal monitoring     Diet:   Drink 8 to 12 glasses of liquids (milk, juice, water) every day.  You may eat meals and snacks.     Activity:  Rest the pelvic area. No sex. Do not stimulate breasts or nipples.  Call your doctor or nurse midwife if your baby is moving less than usual.     Call your provider if you notice:  Swelling in your face or increased swelling in your hands or legs.  Headaches that are not relieved by Tylenol (acetaminophen).  Changes in your vision (blurring: seeing spots or stars.)  Nausea (sick to your stomach) and vomiting (throwing up).   Weight gain of 5 pounds or more per week.  Heartburn that doesn't go away.  Signs of bladder infection: pain when you urinate (use the toilet), need to go more often and more urgently.  The bag of marquez (rupture of membranes) breaks, or you notice leaking in your underwear.  Bright red blood in your underwear.  Abdominal (lower belly) or stomach pain.  For first baby: Contractions (tightening) less than 5 minutes apart for one hour or more.  Second (plus) baby: Contractions (tightening) less than 10 minutes apart and getting stronger.  *If less than 34 weeks: Contractions (tightening) more than 6 times in one hour.  Increase or change in vaginal discharge (note the color and amount)      Follow-up:  As scheduled in the clinic

## 2023-05-23 NOTE — PROGRESS NOTES
Telephone update given to Dr. Lee.  Plan to do NSTs for both babies and update him.  OK to offer patient IV hydration if interested. Brynn was seen in the clinic today and yesterday by Dr. Lee.  BPP for baby A was 4/8 yesterday and 6/8 today.  Patient reports a lot of back pain but does not appear to be in active labor.  Occasional contractions and irritability noted on EFM.  Rosa reports patient's cervix was closed in clinic yesterday and closed again today in clinic. Will continue to monitor babies.

## 2023-06-06 NOTE — TELEPHONE ENCOUNTER
[facility] - Of note MH is not in clinic at this time. It is noted in notes from 10/2020 patient was to have a birth control f/u visit in 3 months. If she is looking for refills of this she should plan to schedule the folllow up appointment and we can ask for a RX to bridge to visit.  
[facility] second attempt to return patients call  
Pt left a voice mail, stating she wants to talk to SULMA Edwards or her nurse regarding changes in her medications and a refill. Pls call back.  
Third and final attempt to return patients call. Will send patient a [employer] message for follow up.  
Former smoker

## 2023-06-11 ENCOUNTER — MEDICAL CORRESPONDENCE (OUTPATIENT)
Dept: HEALTH INFORMATION MANAGEMENT | Facility: CLINIC | Age: 33
End: 2023-06-11
Payer: COMMERCIAL

## 2023-06-11 ENCOUNTER — TRANSFERRED RECORDS (OUTPATIENT)
Dept: HEALTH INFORMATION MANAGEMENT | Facility: CLINIC | Age: 33
End: 2023-06-11

## 2023-06-11 ENCOUNTER — HOSPITAL ENCOUNTER (OUTPATIENT)
Facility: HOSPITAL | Age: 33
Discharge: HOME | End: 2023-06-11
Attending: OBSTETRICS & GYNECOLOGY | Admitting: OBSTETRICS & GYNECOLOGY
Payer: COMMERCIAL

## 2023-06-11 PROCEDURE — 250N000011 HC RX IP 250 OP 636: Performed by: OBSTETRICS & GYNECOLOGY

## 2023-06-11 PROCEDURE — 258N000003 HC RX IP 258 OP 636: Performed by: OBSTETRICS & GYNECOLOGY

## 2023-06-11 PROCEDURE — 250N000013 HC RX MED GY IP 250 OP 250 PS 637: Performed by: OBSTETRICS & GYNECOLOGY

## 2023-06-11 RX ORDER — ONDANSETRON 2 MG/ML
4 INJECTION INTRAMUSCULAR; INTRAVENOUS EVERY 6 HOURS PRN
Status: DISCONTINUED | OUTPATIENT
Start: 2023-06-11 | End: 2023-06-11 | Stop reason: HOSPADM

## 2023-06-11 RX ORDER — HYDROXYZINE HYDROCHLORIDE 50 MG/1
100 TABLET, FILM COATED ORAL EVERY 6 HOURS PRN
Status: DISCONTINUED | OUTPATIENT
Start: 2023-06-11 | End: 2023-06-11 | Stop reason: HOSPADM

## 2023-06-11 RX ORDER — SODIUM CHLORIDE, SODIUM LACTATE, POTASSIUM CHLORIDE, CALCIUM CHLORIDE 600; 310; 30; 20 MG/100ML; MG/100ML; MG/100ML; MG/100ML
INJECTION, SOLUTION INTRAVENOUS CONTINUOUS
Status: DISCONTINUED | OUTPATIENT
Start: 2023-06-11 | End: 2023-06-11 | Stop reason: HOSPADM

## 2023-06-11 RX ADMIN — ONDANSETRON 4 MG: 2 INJECTION INTRAMUSCULAR; INTRAVENOUS at 11:06

## 2023-06-11 RX ADMIN — HYDROXYZINE HYDROCHLORIDE 100 MG: 50 TABLET, FILM COATED ORAL at 11:05

## 2023-06-11 RX ADMIN — SODIUM CHLORIDE, POTASSIUM CHLORIDE, SODIUM LACTATE AND CALCIUM CHLORIDE: 600; 310; 30; 20 INJECTION, SOLUTION INTRAVENOUS at 11:00

## 2023-06-11 ASSESSMENT — ACTIVITIES OF DAILY LIVING (ADL): ADLS_ACUITY_SCORE: 31

## 2023-06-11 NOTE — DISCHARGE INSTRUCTIONS
Discharge Instruction for Undelivered Patients      You were seen for: Labor Assessment  We Consulted: Dr. Lee  You had (Test or Medicine):Vistaril, Zofran and IV fluids     Diet:   Drink 8 to 12 glasses of liquids (milk, juice, water) every day.        Activity:  Call your doctor or nurse midwife if your baby is moving less than usual.     Call your provider if you notice:  Swelling in your face or increased swelling in your hands or legs.  Headaches that are not relieved by Tylenol (acetaminophen).  Changes in your vision (blurring: seeing spots or stars.)  Nausea (sick to your stomach) and vomiting (throwing up).   Weight gain of 5 pounds or more per week.  Heartburn that doesn't go away.  Signs of bladder infection: pain when you urinate (use the toilet), need to go more often and more urgently.  The bag of marquez (rupture of membranes) breaks, or you notice leaking in your underwear.  Bright red blood in your underwear.  Abdominal (lower belly) or stomach pain.  For first baby: Contractions (tightening) less than 5 minutes apart for one hour or more.  Second (plus) baby: Contractions (tightening) less than 10 minutes apart and getting stronger.  *If less than 34 weeks: Contractions (tightening) more than 6 times in one hour.  Increase or change in vaginal discharge (note the color and amount)  Other: Make sure you are drinking plenty of fluids.    Follow-up:  As scheduled in the clinic

## 2023-06-11 NOTE — PROGRESS NOTES
Brynn arrived to [facility] with complaints of contractions since yesterday evening.Brynn states she had 2 24oz bottles of water yesterday and soda before that, Brynn also states she has not eaten or drank anything since last evening. Brynn said she took her vistaril that she was given before and took a bath and was able to get some sleep.  This writer spoke with Dr. Lee and updated him. OK for IV fluid bolus, zofran for nausea and vistaril if more than 6 hours since last dose, recheck cervix in 1-2 hours.  Helen Rockwell RN  6/11/2023 10:51 AM

## 2023-06-11 NOTE — PROGRESS NOTES
Brynn is feeling better after a fluid bolus, vistaril and zofran. Dr. Lee updated and ok to send Brynn home without doing another cervical exam.  Brynn will be discharged to home.  Helen Rockwell RN  6/11/2023 11:57 AM    Discharge instructions reviewed with Brynn. NO questions or concerns. Brynn discharged to home undelivered.  Helen Rockwell RN  6/11/2023 12:31 PM

## 2023-06-12 ENCOUNTER — TRANSFERRED RECORDS (OUTPATIENT)
Dept: HEALTH INFORMATION MANAGEMENT | Facility: CLINIC | Age: 33
End: 2023-06-12
Payer: COMMERCIAL

## 2023-06-13 ENCOUNTER — ANESTHESIA EVENT (OUTPATIENT)
Dept: OBGYN | Facility: HOSPITAL | Age: 33
End: 2023-06-13
Payer: COMMERCIAL

## 2023-06-13 ENCOUNTER — HOSPITAL ENCOUNTER (INPATIENT)
Facility: HOSPITAL | Age: 33
LOS: 1 days | Discharge: HOME | End: 2023-06-14
Attending: OBSTETRICS & GYNECOLOGY | Admitting: OBSTETRICS & GYNECOLOGY
Payer: COMMERCIAL

## 2023-06-13 ENCOUNTER — ANESTHESIA (OUTPATIENT)
Dept: OBGYN | Facility: HOSPITAL | Age: 33
End: 2023-06-13
Payer: COMMERCIAL

## 2023-06-13 DIAGNOSIS — Z98.891 S/P C-SECTION: Primary | ICD-10-CM

## 2023-06-13 LAB
ABO/RH(D): NORMAL
AMPHETAMINES UR QL SCN: ABNORMAL
ANTIBODY SCREEN, TUBE: NORMAL
BARBITURATES UR QL SCN: ABNORMAL
BENZODIAZ UR QL SCN: ABNORMAL
BZE UR QL SCN: ABNORMAL
CANNABINOIDS UR QL SCN: ABNORMAL
HGB BLD-MCNC: 12.1 G/DL (ref 11.7–15.7)
OPIATES UR QL SCN: ABNORMAL
PCP QUAL URINE (ROCHE): ABNORMAL
SPECIMEN EXPIRATION DATE: NORMAL
SPECIMEN EXPIRATION DATE: NORMAL
T PALLIDUM AB SER QL: NONREACTIVE

## 2023-06-13 PROCEDURE — 86901 BLOOD TYPING SEROLOGIC RH(D): CPT | Performed by: OBSTETRICS & GYNECOLOGY

## 2023-06-13 PROCEDURE — 36415 COLL VENOUS BLD VENIPUNCTURE: CPT | Performed by: OBSTETRICS & GYNECOLOGY

## 2023-06-13 PROCEDURE — 86780 TREPONEMA PALLIDUM: CPT | Performed by: OBSTETRICS & GYNECOLOGY

## 2023-06-13 PROCEDURE — 360N000076 HC SURGERY LEVEL 3, PER MIN: Performed by: OBSTETRICS & GYNECOLOGY

## 2023-06-13 PROCEDURE — 250N000013 HC RX MED GY IP 250 OP 250 PS 637: Performed by: OBSTETRICS & GYNECOLOGY

## 2023-06-13 PROCEDURE — 88307 TISSUE EXAM BY PATHOLOGIST: CPT | Mod: TC | Performed by: OBSTETRICS & GYNECOLOGY

## 2023-06-13 PROCEDURE — 258N000003 HC RX IP 258 OP 636: Performed by: NURSE ANESTHETIST, CERTIFIED REGISTERED

## 2023-06-13 PROCEDURE — 250N000011 HC RX IP 250 OP 636: Performed by: ANESTHESIOLOGY

## 2023-06-13 PROCEDURE — 88307 TISSUE EXAM BY PATHOLOGIST: CPT | Mod: 26 | Performed by: PATHOLOGY

## 2023-06-13 PROCEDURE — 250N000011 HC RX IP 250 OP 636: Performed by: OBSTETRICS & GYNECOLOGY

## 2023-06-13 PROCEDURE — 80307 DRUG TEST PRSMV CHEM ANLYZR: CPT | Performed by: OBSTETRICS & GYNECOLOGY

## 2023-06-13 PROCEDURE — 999N000249 HC STATISTIC C-SECTION ON UNIT

## 2023-06-13 PROCEDURE — 120N000001 HC R&B MED SURG/OB

## 2023-06-13 PROCEDURE — 258N000003 HC RX IP 258 OP 636: Performed by: OBSTETRICS & GYNECOLOGY

## 2023-06-13 PROCEDURE — 250N000011 HC RX IP 250 OP 636: Performed by: NURSE ANESTHETIST, CERTIFIED REGISTERED

## 2023-06-13 PROCEDURE — C9290 INJ, BUPIVACAINE LIPOSOME: HCPCS | Performed by: ANESTHESIOLOGY

## 2023-06-13 PROCEDURE — 272N000001 HC OR GENERAL SUPPLY STERILE: Performed by: OBSTETRICS & GYNECOLOGY

## 2023-06-13 PROCEDURE — 85018 HEMOGLOBIN: CPT | Performed by: OBSTETRICS & GYNECOLOGY

## 2023-06-13 PROCEDURE — 250N000009 HC RX 250: Performed by: OBSTETRICS & GYNECOLOGY

## 2023-06-13 PROCEDURE — 370N000017 HC ANESTHESIA TECHNICAL FEE, PER MIN: Performed by: OBSTETRICS & GYNECOLOGY

## 2023-06-13 PROCEDURE — 86850 RBC ANTIBODY SCREEN: CPT | Performed by: OBSTETRICS & GYNECOLOGY

## 2023-06-13 RX ORDER — MISOPROSTOL 200 UG/1
800 TABLET ORAL
Status: DISCONTINUED | OUTPATIENT
Start: 2023-06-13 | End: 2023-06-14 | Stop reason: HOSPADM

## 2023-06-13 RX ORDER — ONDANSETRON 2 MG/ML
4 INJECTION INTRAMUSCULAR; INTRAVENOUS EVERY 30 MIN PRN
Status: DISCONTINUED | OUTPATIENT
Start: 2023-06-13 | End: 2023-06-14 | Stop reason: HOSPADM

## 2023-06-13 RX ORDER — LORAZEPAM 0.5 MG/1
0.5 TABLET ORAL EVERY 12 HOURS PRN
Status: DISCONTINUED | OUTPATIENT
Start: 2023-06-13 | End: 2023-06-14 | Stop reason: HOSPADM

## 2023-06-13 RX ORDER — FENTANYL CITRATE 50 UG/ML
50 INJECTION, SOLUTION INTRAMUSCULAR; INTRAVENOUS EVERY 5 MIN PRN
Status: DISCONTINUED | OUTPATIENT
Start: 2023-06-13 | End: 2023-06-14 | Stop reason: HOSPADM

## 2023-06-13 RX ORDER — ONDANSETRON 4 MG/1
4 TABLET, ORALLY DISINTEGRATING ORAL EVERY 30 MIN PRN
Status: DISCONTINUED | OUTPATIENT
Start: 2023-06-13 | End: 2023-06-14 | Stop reason: HOSPADM

## 2023-06-13 RX ORDER — METHYLERGONOVINE MALEATE 0.2 MG/ML
200 INJECTION INTRAVENOUS
Status: DISCONTINUED | OUTPATIENT
Start: 2023-06-13 | End: 2023-06-14 | Stop reason: HOSPADM

## 2023-06-13 RX ORDER — CARBOPROST TROMETHAMINE 250 UG/ML
250 INJECTION, SOLUTION INTRAMUSCULAR
Status: DISCONTINUED | OUTPATIENT
Start: 2023-06-13 | End: 2023-06-14 | Stop reason: HOSPADM

## 2023-06-13 RX ORDER — FENTANYL CITRATE 50 UG/ML
25 INJECTION, SOLUTION INTRAMUSCULAR; INTRAVENOUS EVERY 5 MIN PRN
Status: DISCONTINUED | OUTPATIENT
Start: 2023-06-13 | End: 2023-06-14 | Stop reason: HOSPADM

## 2023-06-13 RX ORDER — ACETAMINOPHEN 325 MG/1
975 TABLET ORAL ONCE
Status: COMPLETED | OUTPATIENT
Start: 2023-06-13 | End: 2023-06-13

## 2023-06-13 RX ORDER — HYDROCORTISONE 25 MG/G
CREAM TOPICAL 3 TIMES DAILY PRN
Status: DISCONTINUED | OUTPATIENT
Start: 2023-06-13 | End: 2023-06-14 | Stop reason: HOSPADM

## 2023-06-13 RX ORDER — LIDOCAINE 40 MG/G
CREAM TOPICAL
Status: DISCONTINUED | OUTPATIENT
Start: 2023-06-13 | End: 2023-06-14 | Stop reason: HOSPADM

## 2023-06-13 RX ORDER — OXYTOCIN/0.9 % SODIUM CHLORIDE 30/500 ML
340 PLASTIC BAG, INJECTION (ML) INTRAVENOUS CONTINUOUS PRN
Status: COMPLETED | OUTPATIENT
Start: 2023-06-13 | End: 2023-06-13

## 2023-06-13 RX ORDER — METOCLOPRAMIDE 10 MG/1
10 TABLET ORAL EVERY 6 HOURS PRN
Status: DISCONTINUED | OUTPATIENT
Start: 2023-06-13 | End: 2023-06-14 | Stop reason: HOSPADM

## 2023-06-13 RX ORDER — DIPHENHYDRAMINE HCL 25 MG
25 CAPSULE ORAL EVERY 6 HOURS PRN
Status: DISCONTINUED | OUTPATIENT
Start: 2023-06-13 | End: 2023-06-14 | Stop reason: HOSPADM

## 2023-06-13 RX ORDER — OXYCODONE HYDROCHLORIDE 5 MG/1
5 TABLET ORAL EVERY 4 HOURS PRN
Status: DISCONTINUED | OUTPATIENT
Start: 2023-06-13 | End: 2023-06-14 | Stop reason: HOSPADM

## 2023-06-13 RX ORDER — CITRIC ACID/SODIUM CITRATE 334-500MG
30 SOLUTION, ORAL ORAL
Status: COMPLETED | OUTPATIENT
Start: 2023-06-13 | End: 2023-06-13

## 2023-06-13 RX ORDER — BISACODYL 10 MG
10 SUPPOSITORY, RECTAL RECTAL DAILY PRN
Status: DISCONTINUED | OUTPATIENT
Start: 2023-06-15 | End: 2023-06-14 | Stop reason: HOSPADM

## 2023-06-13 RX ORDER — ESCITALOPRAM OXALATE 10 MG/1
10 TABLET ORAL DAILY
Status: DISCONTINUED | OUTPATIENT
Start: 2023-06-13 | End: 2023-06-14 | Stop reason: HOSPADM

## 2023-06-13 RX ORDER — AMOXICILLIN 250 MG
2 CAPSULE ORAL 2 TIMES DAILY
Status: DISCONTINUED | OUTPATIENT
Start: 2023-06-13 | End: 2023-06-14 | Stop reason: HOSPADM

## 2023-06-13 RX ORDER — SODIUM CHLORIDE, SODIUM LACTATE, POTASSIUM CHLORIDE, CALCIUM CHLORIDE 600; 310; 30; 20 MG/100ML; MG/100ML; MG/100ML; MG/100ML
INJECTION, SOLUTION INTRAVENOUS CONTINUOUS
Status: DISCONTINUED | OUTPATIENT
Start: 2023-06-13 | End: 2023-06-14

## 2023-06-13 RX ORDER — NALOXONE HYDROCHLORIDE 0.4 MG/ML
0.2 INJECTION, SOLUTION INTRAMUSCULAR; INTRAVENOUS; SUBCUTANEOUS
Status: DISCONTINUED | OUTPATIENT
Start: 2023-06-13 | End: 2023-06-14 | Stop reason: HOSPADM

## 2023-06-13 RX ORDER — ONDANSETRON 2 MG/ML
4 INJECTION INTRAMUSCULAR; INTRAVENOUS EVERY 6 HOURS PRN
Status: DISCONTINUED | OUTPATIENT
Start: 2023-06-13 | End: 2023-06-14 | Stop reason: HOSPADM

## 2023-06-13 RX ORDER — ONDANSETRON 2 MG/ML
INJECTION INTRAMUSCULAR; INTRAVENOUS PRN
Status: DISCONTINUED | OUTPATIENT
Start: 2023-06-13 | End: 2023-06-13

## 2023-06-13 RX ORDER — MORPHINE SULFATE 1 MG/ML
INJECTION, SOLUTION EPIDURAL; INTRATHECAL; INTRAVENOUS
Status: COMPLETED | OUTPATIENT
Start: 2023-06-13 | End: 2023-06-13

## 2023-06-13 RX ORDER — CEFAZOLIN SODIUM/WATER 2 G/20 ML
2 SYRINGE (ML) INTRAVENOUS
Status: COMPLETED | OUTPATIENT
Start: 2023-06-13 | End: 2023-06-13

## 2023-06-13 RX ORDER — BUPIVACAINE HYDROCHLORIDE 2.5 MG/ML
INJECTION, SOLUTION EPIDURAL; INFILTRATION; INTRACAUDAL
Status: COMPLETED | OUTPATIENT
Start: 2023-06-13 | End: 2023-06-13

## 2023-06-13 RX ORDER — HYDROMORPHONE HCL IN WATER/PF 6 MG/30 ML
0.2 PATIENT CONTROLLED ANALGESIA SYRINGE INTRAVENOUS EVERY 5 MIN PRN
Status: DISCONTINUED | OUTPATIENT
Start: 2023-06-13 | End: 2023-06-14 | Stop reason: HOSPADM

## 2023-06-13 RX ORDER — SIMETHICONE 80 MG
80 TABLET,CHEWABLE ORAL 4 TIMES DAILY PRN
Status: DISCONTINUED | OUTPATIENT
Start: 2023-06-13 | End: 2023-06-14 | Stop reason: HOSPADM

## 2023-06-13 RX ORDER — CEFAZOLIN SODIUM/WATER 2 G/20 ML
2 SYRINGE (ML) INTRAVENOUS SEE ADMIN INSTRUCTIONS
Status: DISCONTINUED | OUTPATIENT
Start: 2023-06-13 | End: 2023-06-14 | Stop reason: HOSPADM

## 2023-06-13 RX ORDER — OXYTOCIN/0.9 % SODIUM CHLORIDE 30/500 ML
100-340 PLASTIC BAG, INJECTION (ML) INTRAVENOUS CONTINUOUS PRN
Status: DISCONTINUED | OUTPATIENT
Start: 2023-06-13 | End: 2023-06-14 | Stop reason: HOSPADM

## 2023-06-13 RX ORDER — OXYTOCIN 10 [USP'U]/ML
10 INJECTION, SOLUTION INTRAMUSCULAR; INTRAVENOUS
Status: DISCONTINUED | OUTPATIENT
Start: 2023-06-13 | End: 2023-06-14 | Stop reason: HOSPADM

## 2023-06-13 RX ORDER — HYDROMORPHONE HCL IN WATER/PF 6 MG/30 ML
0.4 PATIENT CONTROLLED ANALGESIA SYRINGE INTRAVENOUS EVERY 5 MIN PRN
Status: DISCONTINUED | OUTPATIENT
Start: 2023-06-13 | End: 2023-06-14 | Stop reason: HOSPADM

## 2023-06-13 RX ORDER — MODIFIED LANOLIN
OINTMENT (GRAM) TOPICAL
Status: DISCONTINUED | OUTPATIENT
Start: 2023-06-13 | End: 2023-06-14 | Stop reason: HOSPADM

## 2023-06-13 RX ORDER — MISOPROSTOL 200 UG/1
400 TABLET ORAL
Status: DISCONTINUED | OUTPATIENT
Start: 2023-06-13 | End: 2023-06-14 | Stop reason: HOSPADM

## 2023-06-13 RX ORDER — BUPIVACAINE HYDROCHLORIDE 7.5 MG/ML
INJECTION, SOLUTION INTRASPINAL
Status: COMPLETED | OUTPATIENT
Start: 2023-06-13 | End: 2023-06-13

## 2023-06-13 RX ORDER — DIPHENHYDRAMINE HYDROCHLORIDE 50 MG/ML
25 INJECTION INTRAMUSCULAR; INTRAVENOUS EVERY 6 HOURS PRN
Status: DISCONTINUED | OUTPATIENT
Start: 2023-06-13 | End: 2023-06-13

## 2023-06-13 RX ORDER — ONDANSETRON 4 MG/1
4 TABLET, ORALLY DISINTEGRATING ORAL EVERY 6 HOURS PRN
Status: DISCONTINUED | OUTPATIENT
Start: 2023-06-13 | End: 2023-06-14 | Stop reason: HOSPADM

## 2023-06-13 RX ORDER — DEXTROSE, SODIUM CHLORIDE, SODIUM LACTATE, POTASSIUM CHLORIDE, AND CALCIUM CHLORIDE 5; .6; .31; .03; .02 G/100ML; G/100ML; G/100ML; G/100ML; G/100ML
INJECTION, SOLUTION INTRAVENOUS CONTINUOUS
Status: DISCONTINUED | OUTPATIENT
Start: 2023-06-13 | End: 2023-06-14 | Stop reason: HOSPADM

## 2023-06-13 RX ORDER — NALOXONE HYDROCHLORIDE 0.4 MG/ML
0.4 INJECTION, SOLUTION INTRAMUSCULAR; INTRAVENOUS; SUBCUTANEOUS
Status: DISCONTINUED | OUTPATIENT
Start: 2023-06-13 | End: 2023-06-14 | Stop reason: HOSPADM

## 2023-06-13 RX ORDER — OXYTOCIN/0.9 % SODIUM CHLORIDE 30/500 ML
340 PLASTIC BAG, INJECTION (ML) INTRAVENOUS CONTINUOUS PRN
Status: DISCONTINUED | OUTPATIENT
Start: 2023-06-13 | End: 2023-06-14 | Stop reason: HOSPADM

## 2023-06-13 RX ORDER — ACETAMINOPHEN 325 MG/1
975 TABLET ORAL EVERY 6 HOURS
Status: DISCONTINUED | OUTPATIENT
Start: 2023-06-13 | End: 2023-06-14 | Stop reason: HOSPADM

## 2023-06-13 RX ORDER — DIPHENHYDRAMINE HCL 25 MG
25 CAPSULE ORAL EVERY 6 HOURS PRN
Status: DISCONTINUED | OUTPATIENT
Start: 2023-06-13 | End: 2023-06-13

## 2023-06-13 RX ORDER — KETOROLAC TROMETHAMINE 30 MG/ML
30 INJECTION, SOLUTION INTRAMUSCULAR; INTRAVENOUS EVERY 6 HOURS
Status: DISCONTINUED | OUTPATIENT
Start: 2023-06-13 | End: 2023-06-13

## 2023-06-13 RX ORDER — AMOXICILLIN 250 MG
1 CAPSULE ORAL 2 TIMES DAILY
Status: DISCONTINUED | OUTPATIENT
Start: 2023-06-13 | End: 2023-06-14 | Stop reason: HOSPADM

## 2023-06-13 RX ORDER — IBUPROFEN 800 MG/1
800 TABLET, FILM COATED ORAL EVERY 6 HOURS
Status: DISCONTINUED | OUTPATIENT
Start: 2023-06-13 | End: 2023-06-14 | Stop reason: HOSPADM

## 2023-06-13 RX ORDER — METOCLOPRAMIDE HYDROCHLORIDE 5 MG/ML
10 INJECTION INTRAMUSCULAR; INTRAVENOUS EVERY 6 HOURS PRN
Status: DISCONTINUED | OUTPATIENT
Start: 2023-06-13 | End: 2023-06-14 | Stop reason: HOSPADM

## 2023-06-13 RX ADMIN — ACETAMINOPHEN 975 MG: 325 TABLET ORAL at 11:44

## 2023-06-13 RX ADMIN — Medication 2 G: at 13:11

## 2023-06-13 RX ADMIN — Medication 300 ML/HR: at 13:38

## 2023-06-13 RX ADMIN — PHENYLEPHRINE HYDROCHLORIDE 200 MCG: 10 INJECTION INTRAVENOUS at 13:43

## 2023-06-13 RX ADMIN — SODIUM CHLORIDE, POTASSIUM CHLORIDE, SODIUM LACTATE AND CALCIUM CHLORIDE 200 ML/HR: 600; 310; 30; 20 INJECTION, SOLUTION INTRAVENOUS at 11:43

## 2023-06-13 RX ADMIN — ACETAMINOPHEN 975 MG: 325 TABLET ORAL at 23:15

## 2023-06-13 RX ADMIN — FENTANYL CITRATE 50 MCG: 50 INJECTION, SOLUTION INTRAMUSCULAR; INTRAVENOUS at 14:52

## 2023-06-13 RX ADMIN — ONDANSETRON 4 MG: 2 INJECTION INTRAMUSCULAR; INTRAVENOUS at 18:43

## 2023-06-13 RX ADMIN — Medication 0.15 MG: at 13:11

## 2023-06-13 RX ADMIN — IBUPROFEN 800 MG: 800 TABLET ORAL at 16:40

## 2023-06-13 RX ADMIN — ACETAMINOPHEN 975 MG: 325 TABLET ORAL at 17:27

## 2023-06-13 RX ADMIN — PHENYLEPHRINE HYDROCHLORIDE 200 MCG: 10 INJECTION INTRAVENOUS at 13:40

## 2023-06-13 RX ADMIN — SODIUM CITRATE AND CITRIC ACID MONOHYDRATE 30 ML: 500; 334 SOLUTION ORAL at 13:03

## 2023-06-13 RX ADMIN — BUPIVACAINE 20 ML: 13.3 INJECTION, SUSPENSION, LIPOSOMAL INFILTRATION at 13:58

## 2023-06-13 RX ADMIN — PHENYLEPHRINE HYDROCHLORIDE 200 MCG: 10 INJECTION INTRAVENOUS at 13:19

## 2023-06-13 RX ADMIN — SENNOSIDES AND DOCUSATE SODIUM 1 TABLET: 50; 8.6 TABLET ORAL at 20:21

## 2023-06-13 RX ADMIN — LORAZEPAM 0.5 MG: 0.5 TABLET ORAL at 16:40

## 2023-06-13 RX ADMIN — IBUPROFEN 800 MG: 800 TABLET ORAL at 23:16

## 2023-06-13 RX ADMIN — PHENYLEPHRINE HYDROCHLORIDE 0.5 MCG/KG/MIN: 10 INJECTION INTRAVENOUS at 13:13

## 2023-06-13 RX ADMIN — TRANEXAMIC ACID 1 G: 1 INJECTION, SOLUTION INTRAVENOUS at 13:32

## 2023-06-13 RX ADMIN — METOCLOPRAMIDE 10 MG: 5 INJECTION, SOLUTION INTRAMUSCULAR; INTRAVENOUS at 15:17

## 2023-06-13 RX ADMIN — ONDANSETRON 4 MG: 2 INJECTION INTRAMUSCULAR; INTRAVENOUS at 13:12

## 2023-06-13 RX ADMIN — ESCITALOPRAM OXALATE 10 MG: 10 TABLET ORAL at 15:04

## 2023-06-13 RX ADMIN — BUPIVACAINE HYDROCHLORIDE IN DEXTROSE 1.6 ML: 7.5 INJECTION, SOLUTION SUBARACHNOID at 13:11

## 2023-06-13 RX ADMIN — DIPHENHYDRAMINE HYDROCHLORIDE 25 MG: 25 CAPSULE ORAL at 16:09

## 2023-06-13 RX ADMIN — SODIUM CHLORIDE, SODIUM LACTATE, POTASSIUM CHLORIDE, CALCIUM CHLORIDE AND DEXTROSE MONOHYDRATE: 5; 600; 310; 30; 20 INJECTION, SOLUTION INTRAVENOUS at 17:28

## 2023-06-13 RX ADMIN — BUPIVACAINE HYDROCHLORIDE 20 ML: 2.5 INJECTION, SOLUTION EPIDURAL; INFILTRATION; INTRACAUDAL at 13:58

## 2023-06-13 RX ADMIN — ONDANSETRON 4 MG: 2 INJECTION INTRAMUSCULAR; INTRAVENOUS at 11:39

## 2023-06-13 RX ADMIN — PHENYLEPHRINE HYDROCHLORIDE 150 MCG: 10 INJECTION INTRAVENOUS at 13:50

## 2023-06-13 ASSESSMENT — ACTIVITIES OF DAILY LIVING (ADL)
DIFFICULTY_EATING/SWALLOWING: NO
WEAR_GLASSES_OR_BLIND: NO
ADLS_ACUITY_SCORE: 15
CHANGE_IN_FUNCTIONAL_STATUS_SINCE_ONSET_OF_CURRENT_ILLNESS/INJURY: NO
ADLS_ACUITY_SCORE: 15
ADLS_ACUITY_SCORE: 15
DRESS: 0-->INDEPENDENT
ADLS_ACUITY_SCORE: 15
DOING_ERRANDS_INDEPENDENTLY_DIFFICULTY: NO
ADLS_ACUITY_SCORE: 31
CONCENTRATING,_REMEMBERING_OR_MAKING_DECISIONS_DIFFICULTY: NO
DRESS: 0-->INDEPENDENT
FALL_HISTORY_WITHIN_LAST_SIX_MONTHS: NO
WALKING_OR_CLIMBING_STAIRS_DIFFICULTY: NO
TOILETING_ISSUES: NO
DIFFICULTY_COMMUNICATING: NO
BATHING: 0-->INDEPENDENT
HEARING_DIFFICULTY_OR_DEAF: NO
DRESSING/BATHING_DIFFICULTY: NO
ADLS_ACUITY_SCORE: 15
ADLS_ACUITY_SCORE: 15

## 2023-06-13 NOTE — ANESTHESIA CARE TRANSFER NOTE
Patient: Kary Sebastian    Procedure: Procedure(s):  PRIMARY  SECTION       Diagnosis: Fetal growth restriction antepartum [O36.5990]  Diagnosis Additional Information: No value filed.    Anesthesia Type:   Spinal     Note:    Oropharynx: oropharynx clear of all foreign objects and spontaneously breathing  Level of Consciousness: awake  Oxygen Supplementation: room air    Independent Airway: airway patency satisfactory and stable  Dentition: dentition unchanged  Vital Signs Stable: post-procedure vital signs reviewed and stable  Report to RN Given: handoff report given  Patient transferred to: Labor and Delivery    Handoff Report: Identifed the Patient, Identified the Reponsible Provider, Reviewed the pertinent medical history, Discussed the surgical course, Reviewed Intra-OP anesthesia mangement and issues during anesthesia, Set expectations for post-procedure period and Allowed opportunity for questions and acknowledgement of understanding      Vitals:  Vitals Value Taken Time   /68 23 1424   Temp 36.4  C (97.5  F) 23 1424   Pulse 70    Resp 12 23 1424   SpO2 100 % 23 1424       Electronically Signed By: KWAKU Maxwell CRNA  2023  2:24 PM
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
## 2023-06-13 NOTE — PROGRESS NOTES
PRN benadryl given per MD Lee. lexapro given. Per patient request another anxiety medications. Awaiting call back.    Salty Levi RN

## 2023-06-13 NOTE — PLAN OF CARE
Brynn states she was here for her       Problem:  Delivery  Goal: Stable Fetal Wellbeing  Outcome: Progressing    Efm category I X 2    Problem:  Delivery  Goal: Absence of Infection Signs and Symptoms  Outcome: Progressing     Antibiotic ordered for surgery    Problem:  Delivery  Goal: Bleeding is Controlled  Outcome: Progressing     No bleeding at this time, 2 ivs placed

## 2023-06-13 NOTE — PROCEDURES
PROCEDURE NOTE:      NAME:  Kary Sebastian   RECORD # [id number]   ADMIT DATE: 2023    DATE OF SERVICE: 2023     PREOPERATIVE DIAGNOSIS: twins, FGR, Vtx,breech, 34+2    PROCEDURE: Low transverse  section      SURGEON:  Geremias Lee MD     ASSISTANT: OR staff    ANESTHESIA: spinal    Delivery QBL (mL): 662     DRAINS: Alicea catheter.    COMPLICATIONS: None    FINDINGS: Normal uterus, tubes and ovaries bilateral. Normal appearance to the adnexae.  Live female and male infant born, Apgars of      Romulo Female-A Kary [[id number]]   8      Romulo, Male-MIKHAIL Preston [[id number]]   7     at 1 minute,      Romulo Female-A Kary [[id number]]   8      Romulo, Male-B Kary [[id number]]   8    at 5 minutes,  Weight (oz):  Romulo Female-SHARITA Preston [[id number]]59.26 Mumford, Male-MIKHAIL Preston [[id number]]64.2  oz.    CONSENT: Patient was met preoperatively where we discussed the procedure and the risks associated with the procedure.  She understood these to include but not limited to injury to adjacent organs including bowel, bladder, ureter, infection and bleeding. Understanding these risks her consents were signed.      PROCEDURE: Patient was brought to the operating room in stable condition.  After induction of a spinal anesthetic, fetal heart tones were checked and were stable. She was prepped and draped in sterile fashion for the procedure.  A timeout was then performed.      A pfannensteil skin incision was made to the level of the fascia.  The fascia was incised laterally. Kocher clamps were applied to the superior aspect of the incision which was sharply dissected from the rectus muscles.  The kocher clamps were then reapplied to the inferior aspect of the incision which was similarly sharply dissected from the rectus muscles.  The rectus muscles were  bluntly in the midline.  The peritoneum was entered sharply. This incision was then extended.  A bladder blade was introduced. The  vesicouterine peritoneum was identified and a bladder flap was created.  A low transverse uterine incision was made and amniotic sac was ruptured revealing clear amniotic fluid.  The baby's head was then delivered.There was not a nuchal cord noted. There was a spontaneous cry and therefore bulb suction was performed. The remainder of the infant was easily delivered. The cord was clamped x 2 and cut and the infant handed off to waiting nursing personnel. Baby B was delivered from the breech in a similar manner, atraumatically, and handed off to nursery personnel.    The placenta was then manually removed from the uterus.  The uterus was exteriorized, covered with a moist laparotomy sponge and cleared of all clots and debris.  The uterine incision was closed with 0 chromic from both angles in a running locking suture. The uterus was returned to the abdominopelvic cavity.  The pericolic gutters were cleared of all clots and debris.  The uterine incision was again inspected and noted to be hemostatic.  The peritoneum was closed with suture superiorly to inferiorly.  The rectus muscles were made hemostatic with the use of electrocautery and brought together with figure-of-8 sutures of suture, the fascia was brought together with PDS loop from both angles in a running nonlocking suture, met at the midline, the subcutaneous tissues were irrigated, made hemostatic with use of electrocautery and brought together with 3-0 plain.  Skin was closed with staples.  Patient tolerated this procedure well.  Sponge, lap and needle counts were correct x two.    Geremias Lee MD        CC: Ashlee Drake, Geremias Lee MD

## 2023-06-13 NOTE — ANESTHESIA PROCEDURE NOTES
TAP Procedure Note    Pre-Procedure   Staff -        Anesthesiologist:  Geremias Gamboa MD       Performed By: anesthesiologist       Location: OR       Procedure Start/Stop Times: 6/13/2023 1:58 PM and 6/13/2023 2:03 PM       Pre-Anesthestic Checklist: patient identified, IV checked, site marked, risks and benefits discussed, informed consent, monitors and equipment checked, pre-op evaluation, at physician/surgeon's request and post-op pain management  Timeout:       Correct Patient: Yes        Correct Procedure: Yes        Correct Site: Yes        Correct Position: Yes        Correct Laterality: Yes        Site Marked: Yes  Procedure Documentation  Procedure: TAP       Laterality: bilateral       Patient Position: supine       Patient Prep/Sterile Barriers: sterile gloves, mask       Skin prep: DuraPrep and Chloraprep       Needle Type: insulated       Needle Gauge: 20.        Needle Length (Inches): 6        Ultrasound guided       1. Ultrasound was used to identify targeted nerve, plexus, vascular marker, or fascial plane and place a needle adjacent to it in real-time.       2. Ultrasound was used to visualize the spread of anesthetic in close proximity to the above referenced structure.       3. A permanent image is entered into the patient's record.       4. The visualized anatomic structures appeared normal.       5. There were no apparent abnormal pathologic findings.    Assessment/Narrative         The placement was negative for: blood aspirated, painful injection and site bleeding       Paresthesias: No.       Bolus given via needle. no blood aspirated via catheter.        Secured via.        Insertion/Infusion Method: Single Shot    Medication(s) Administered   Bupivacaine 0.25% PF (Infiltration) - Infiltration   20 mL - 6/13/2023 1:58:00 PM  Bupivacaine liposome (Exparel) 1.3% LA inj susp (Infiltration) - Infiltration   20 mL - 6/13/2023 1:58:00 PM  Medication Administration Time: 6/13/2023 1:58  "PM      FOR [facility] ONLY:   Pain Team Contact information: please page the Pain Team Via [employer]. Search \"Pain\". During daytime hours, please page the attending first. At night please page the resident first.      "

## 2023-06-13 NOTE — ANESTHESIA PROCEDURE NOTES
"Intrathecal injection Procedure Note    Pre-Procedure   Staff -        Anesthesiologist:  Geremias Gamboa MD       Performed By: anesthesiologist       Location: OR       Procedure Start/Stop Times: 6/13/2023 1:11 PM and 6/13/2023 1:15 PM       Pre-Anesthestic Checklist: patient identified, IV checked, risks and benefits discussed, informed consent, monitors and equipment checked, pre-op evaluation, at physician/surgeon's request and post-op pain management  Timeout:       Correct Patient: Yes        Correct Procedure: Yes        Correct Site: Yes        Correct Position: Yes   Procedure Documentation  Procedure: intrathecal injection       Patient Position: sitting       Patient Prep/Sterile Barriers: sterile gloves, mask, patient draped       Skin prep: Chloraprep       Insertion Site: L3-4. (midline approach).       Needle Gauge: 25.        Needle Length (Inches): 4        Spinal Needle Type: Pencan       Introducer used       # of attempts: 1 and  # of redirects:     Assessment/Narrative         Paresthesias: No.       CSF fluid: clear.    Medication(s) Administered   0.75% Hyperbaric Bupivacaine (Intrathecal) - Intrathecal   1.6 mL - 6/13/2023 1:11:00 PM  Morphine PF 1 mg/mL (Intrathecal) - Intrathecal   0.15 mg - 6/13/2023 1:11:00 PM  Medication Administration Time: 6/13/2023 1:11 PM      FOR [facility] ([facility]) ONLY:   Pain Team Contact information: please page the Pain Team Via [employer]. Search \"Pain\". During daytime hours, please page the attending first. At night please page the resident first.      "

## 2023-06-13 NOTE — PLAN OF CARE
All sensation back. Up to bathroom steady gait. Alicea catheter taken out at 1800. Scheduled ibuprofen and tylenol given. Benadryl and ativan given for restlessness and anxiety, effective. Scheduled lexapro given, hx of anxiety. Incision CDI. Baby boy was seen by patient prior to transfer, patient sad and reassured that patient will get transfer when bed is available, called [facility] at 1800, no bed. Nausea intermittent, reglan and zofran given, IVF restarted.     Problem: Postpartum ( Delivery)  Goal: Fluid and Electrolyte Balance  Outcome: Progressing  Goal: Absence of Infection Signs and Symptoms  Outcome: Progressing  Goal: Optimal Pain Control and Function  Outcome: Progressing  Goal: Nausea and Vomiting Relief  Outcome: Progressing  Intervention: Prevent or Manage Nausea and Vomiting  Recent Flowsheet Documentation  Taken 2023 1515 by Salty Levi, RN  Nausea/Vomiting Interventions:   antiemetic   cool cloth applied   sips of clear liquids given  Goal: Effective Urinary Elimination  Outcome: Progressing  Goal: Effective Oxygenation and Ventilation  Outcome: Progressing     Problem: Postpartum ( Delivery)  Goal: Anesthesia/Sedation Recovery  Outcome: Met   Goal Outcome Evaluation:         Salty Levi, RN

## 2023-06-13 NOTE — ANESTHESIA POSTPROCEDURE EVALUATION
Patient: Kary Sebastian    Procedure: Procedure(s):  PRIMARY  SECTION       Anesthesia Type:  Spinal    Note:  Disposition: Inpatient   Postop Pain Control: Uneventful            Sign Out: Well controlled pain   PONV: No   Neuro/Psych: Uneventful            Sign Out: Acceptable/Baseline neuro status   Airway/Respiratory: Uneventful            Sign Out: Acceptable/Baseline resp. status   CV/Hemodynamics: Uneventful            Sign Out: Acceptable CV status; No obvious hypovolemia; No obvious fluid overload   Other NRE:    DID A NON-ROUTINE EVENT OCCUR?            Last vitals:  Vitals:    23 1115 23 1424   BP: 120/73 117/68   Pulse: 86    Resp: 20 12   Temp: 37  C (98.6  F) 36.4  C (97.5  F)   SpO2:  100%       Electronically Signed By: Geremias Gamboa MD  2023  2:46 PM

## 2023-06-14 VITALS
HEIGHT: 66 IN | RESPIRATION RATE: 18 BRPM | DIASTOLIC BLOOD PRESSURE: 73 MMHG | SYSTOLIC BLOOD PRESSURE: 104 MMHG | BODY MASS INDEX: 26.03 KG/M2 | WEIGHT: 162 LBS | HEART RATE: 53 BPM | TEMPERATURE: 97.7 F | OXYGEN SATURATION: 100 %

## 2023-06-14 LAB
HGB BLD-MCNC: 10.1 G/DL (ref 11.7–15.7)
HOLD SPECIMEN: NORMAL
PATH REPORT.COMMENTS IMP SPEC: NORMAL
PATH REPORT.COMMENTS IMP SPEC: NORMAL
PATH REPORT.FINAL DX SPEC: NORMAL
PATH REPORT.GROSS SPEC: NORMAL
PATH REPORT.MICROSCOPIC SPEC OTHER STN: NORMAL
PATH REPORT.RELEVANT HX SPEC: NORMAL
PHOTO IMAGE: NORMAL

## 2023-06-14 PROCEDURE — 250N000011 HC RX IP 250 OP 636: Performed by: OBSTETRICS & GYNECOLOGY

## 2023-06-14 PROCEDURE — 36415 COLL VENOUS BLD VENIPUNCTURE: CPT | Performed by: OBSTETRICS & GYNECOLOGY

## 2023-06-14 PROCEDURE — 85018 HEMOGLOBIN: CPT | Performed by: OBSTETRICS & GYNECOLOGY

## 2023-06-14 PROCEDURE — 250N000013 HC RX MED GY IP 250 OP 250 PS 637: Performed by: OBSTETRICS & GYNECOLOGY

## 2023-06-14 RX ORDER — SENNA AND DOCUSATE SODIUM 50; 8.6 MG/1; MG/1
1 TABLET, FILM COATED ORAL AT BEDTIME
Qty: 60 TABLET | Refills: 0 | Status: SHIPPED | OUTPATIENT
Start: 2023-06-14 | End: 2023-09-13

## 2023-06-14 RX ORDER — ACETAMINOPHEN 500 MG
500-1000 TABLET ORAL EVERY 6 HOURS PRN
Qty: 60 TABLET | Refills: 0 | Status: SHIPPED | OUTPATIENT
Start: 2023-06-14 | End: 2023-09-13

## 2023-06-14 RX ORDER — IBUPROFEN 600 MG/1
600 TABLET, FILM COATED ORAL EVERY 6 HOURS PRN
Qty: 40 TABLET | Refills: 0 | Status: SHIPPED | OUTPATIENT
Start: 2023-06-14 | End: 2023-09-13

## 2023-06-14 RX ORDER — OXYCODONE HYDROCHLORIDE 5 MG/1
5 TABLET ORAL EVERY 6 HOURS PRN
Qty: 24 TABLET | Refills: 0 | Status: SHIPPED | OUTPATIENT
Start: 2023-06-14 | End: 2023-06-20

## 2023-06-14 RX ADMIN — IBUPROFEN 800 MG: 800 TABLET ORAL at 11:47

## 2023-06-14 RX ADMIN — IBUPROFEN 800 MG: 800 TABLET ORAL at 05:06

## 2023-06-14 RX ADMIN — METOCLOPRAMIDE 10 MG: 5 INJECTION, SOLUTION INTRAMUSCULAR; INTRAVENOUS at 02:38

## 2023-06-14 RX ADMIN — ESCITALOPRAM OXALATE 10 MG: 10 TABLET ORAL at 08:59

## 2023-06-14 RX ADMIN — ACETAMINOPHEN 975 MG: 325 TABLET ORAL at 11:47

## 2023-06-14 RX ADMIN — ACETAMINOPHEN 975 MG: 325 TABLET ORAL at 05:06

## 2023-06-14 ASSESSMENT — ACTIVITIES OF DAILY LIVING (ADL)
ADLS_ACUITY_SCORE: 15

## 2023-06-14 NOTE — CONSULTS
12:18 PM  SW met and spoke with Pt regarding her discharge and to offer any immediate support she may need. Pt's twins were transferred to [facility] yesterday due to capacity at [facility]. [facility] is at capacity and has not been able to have a bed available for Pt transfer. Pt is now cleared for discharge where she will not have her friend pick her up at 1300 to bring her straight to [facility]. Pt told SW that her main goal right now is to get to her  babies at [facility]. SW stated understanding and asked if she was needing any immediate assistance following discharge. Pt stated she may need help with getting some supplies for the twins before she takes them home. SW stated that the [facility] SW team would complete an initial assessment and offer resources now that both twins are admitted to their NICU. Pt reported understanding and expressed appreciation for all staff at the hospital and their kindness to her during her current admission. Pt reported her three other children are staying with the children's father's mother during Pt's hospitalization. Pt denies having questions or needs for SW but expressed her appreciation for SW checking in with her prior to discharge.     ELVA Bonner

## 2023-06-14 NOTE — PLAN OF CARE
VSS. Up ad alejandra. Scant amount of lochia, no clots noted. Incision covered, CDI. Tylenol and Ibuprofen for pain control. BS audible/active x4, tolerating PO, denies N/V. Voiding. Viewing infants on phone and IPad, speaking positively of infants and birth experience, eager and anxious to be transferred to [facility] and be closer to infants, but coping well. Her friend has the other bands, communicating with patient and being supportive. Plan is to transfer to [facility] when a bed is available. Continue with plan of care.

## 2023-06-14 NOTE — PROGRESS NOTES
All discharge education completed including review of danger signs. Parents verbalize understanding and deny having additional questions. Follow up is planned for 2 weeks. Pt walked out with NA. Will  prescriptions after discharge but heading to see twins at [facility] prior.      Tabatha Clay, RN

## 2023-06-14 NOTE — DISCHARGE SUMMARY
[facility] Discharge Summary    Kary Sebastian MRN# [id number]   Age: 32 year old YOB: 1990     Date of Admission:  2023  Date of Discharge::  2023  Admitting Physician:  Geremias Lee MD  Discharge Physician:  Jenni Lopez MD     Home clinic: [facility]            Admission Diagnoses:   Fetal growth restriction antepartum [O36.5990]  Twins            Discharge Diagnosis:   Fetal growth restriction antepartum [O36.5990]  Twin gestationa          Procedures:   Procedure(s): Primary low transverse  section                  Medications Prior to Admission:     Medications Prior to Admission   Medication Sig Dispense Refill Last Dose     albuterol (PROAIR HFA/PROVENTIL HFA/VENTOLIN HFA) 108 (90 Base) MCG/ACT inhaler Inhale 2 puffs into the lungs every 4 hours        albuterol (PROVENTIL) (2.5 MG/3ML) 0.083% neb solution Inhale 2.5 mg into the lungs        Butalbital-Acetaminophen (PHRENILIN)  MG TABS per tablet Take 1 tablet by mouth 2 times daily        famotidine (PEPCID) 10 MG tablet Take 10 mg by mouth 2 times daily        medical cannabis (Patient's own supply) See Admin Instructions (The purpose of this order is to document that the patient reports taking medical cannabis.  This is not a prescription, and is not used to certify that the patient has a qualifying medical condition.)        ondansetron (ZOFRAN) 4 MG tablet Take 2 tablets by mouth 2 times daily        [DISCONTINUED] escitalopram (LEXAPRO) 5 MG tablet Take 1 tablet by mouth daily at 2 pm                Discharge Medications:     Current Discharge Medication List      START taking these medications    Details   acetaminophen (TYLENOL) 500 MG tablet Take 1-2 tablets (500-1,000 mg) by mouth every 6 hours as needed  Qty: 60 tablet, Refills: 0    Associated Diagnoses: S/P       ibuprofen (ADVIL/MOTRIN) 600 MG tablet Take 1 tablet (600 mg) by mouth every 6 hours as  needed  Qty: 40 tablet, Refills: 0    Associated Diagnoses: S/P       oxyCODONE (ROXICODONE) 5 MG tablet Take 1 tablet (5 mg) by mouth every 6 hours as needed for pain  Qty: 24 tablet, Refills: 0    Associated Diagnoses: S/P       SENNA-docusate sodium (SENNA S) 8.6-50 MG tablet Take 1 tablet by mouth At Bedtime  Qty: 60 tablet, Refills: 0    Associated Diagnoses: S/P          CONTINUE these medications which have NOT CHANGED    Details   albuterol (PROAIR HFA/PROVENTIL HFA/VENTOLIN HFA) 108 (90 Base) MCG/ACT inhaler Inhale 2 puffs into the lungs every 4 hours      albuterol (PROVENTIL) (2.5 MG/3ML) 0.083% neb solution Inhale 2.5 mg into the lungs      Butalbital-Acetaminophen (PHRENILIN)  MG TABS per tablet Take 1 tablet by mouth 2 times daily      famotidine (PEPCID) 10 MG tablet Take 10 mg by mouth 2 times daily      medical cannabis (Patient's own supply) See Admin Instructions (The purpose of this order is to document that the patient reports taking medical cannabis.  This is not a prescription, and is not used to certify that the patient has a qualifying medical condition.)      ondansetron (ZOFRAN) 4 MG tablet Take 2 tablets by mouth 2 times daily         STOP taking these medications       escitalopram (LEXAPRO) 5 MG tablet Comments:   Reason for Stopping:                     Consultations:   No consultations were requested during this admission          Brief History of Labor or Admission:   Admitted for surgery- primary csection- twin gestation with  intrauterine growth retardation.   , surgery without complications             Hospital Course:   The patient's hospital course was unremarkable.  Babies were transferred to [facility] for NICU availability.  She desires to discharge to be with her infants. She recovered as anticipated and experienced no post-operative complications.  On discharge, her pain was well controlled. Vaginal bleeding is similar to peak menstrual flow.   Voiding without difficulty.  Ambulating well and tolerating a normal diet.  No fever or significant wound drainage.     Post-partum hemoglobin:   Hemoglobin   Date Value Ref Range Status   06/14/2023 10.1 (L) 11.7 - 15.7 g/dL Final   09/17/2018 9.9 (L) 11.7 - 15.7 g/dL Final             Discharge Instructions and Follow-Up:   Discharge diet: Regular   Discharge activity: No heavy lifting for 6 week(s)  No driving or operating machinery while on narcotic analgesics   Discharge follow-up: Follow up with [facility]   in 2 weeks   Wound care: Drink plenty of fluids  Ice to area for comfort  Keep wound clean and dry           Discharge Disposition:   Discharged to home      Attestation:  I have reviewed today's vital signs, notes, medications, labs and imaging.    Jenni Lopez MD

## 2023-06-14 NOTE — PLAN OF CARE
Problem: Postpartum ( Delivery)  Goal: Optimal Pain Control and Function  Outcome: Progressing   Goal Outcome Evaluation:       Pt states pain controlled with Tylenol/Ibuprofen prn. Abd binder prn.  C/o referred shoulder pain, enc to ambulate. VSS. Assessment WNL.      Problem: Postpartum ( Delivery)  Goal: Successful Maternal Role Transition  Outcome: Progressing     Pt coping with twin babies in NICU at [facility]. Anxious & eager to discharge or transfer of care to be with them. Awaiting rounders today for plan. No bed availability this a.m. per charge nurse.    Tabatha Clay RN

## 2023-06-14 NOTE — DISCHARGE INSTRUCTIONS
Postop  Birth Instructions    Activity     Do not lift more than 10 pounds for 6 weeks after surgery.  Ask family and friends for help when you need it.  No driving until you have stopped taking your pain medications (usually two weeks after surgery).  No heavy exercise or activity for 6 weeks.  Don't do anything that will put a strain on your surgery site.  Don't strain when using the toilet.  Your care team may prescribe a stool softener if you have problems with your bowel movements.     To care for your incision:     Keep the incision clean and dry.  Do not soak your incision in water. No swimming or hot tubs until it has fully healed. You may soak in the bathtub if the water level is below your incision.  Do not use peroxide, gel, cream, lotion, or ointment on your incision.  Adjust your clothes to avoid pressure on your surgery site (check the elastic in your underwear for example).     You may see a small amount of clear or pink drainage and this is normal.  Check with your health care provider:     If the drainage increases or has an odor.  If the incision reddens, you have swelling, or develop a rash.  If you have increased pain and the medicine we prescribed doesn't help.  If you have a fever above 100.4 F (38 C) with or without chills when placing thermometer under your tongue.   The area around your incision (surgery wound), will feel numb.  This is normal. The numbness should go away in less than a year.     Keep your hands clean:  Always wash your hands before touching your incision (surgery wound). This helps reduce your risk of infection. If your hands aren't dirty, you may use an alcohol hand-rub to clean your hands. Keep your nails clean and short.    Call your healthcare provider if you have any of these symptoms:     You soak a sanitary pad with blood within 1 hour, or you see blood clots larger than a golf ball.  Bleeding that lasts more than 6 weeks.  Vaginal discharge that smells  bad.  Severe pain, cramping or tenderness in your lower belly area.  A need to urinate more frequently (use the toilet more often), more urgently (use the toilet very quickly), or it burns when you urinate.  Nausea and vomiting.  Redness, swelling or pain around a vein in your leg.  Problems breastfeeding or a red or painful area on your breast.  Chest pain and cough or are gasping for air.  Problems with coping with sadness, anxiety or depression. If you have concerns about hurting yourself or the baby, call your provider immediately.    You have questions or concerns after you return home.              Warning Signs after Having a Baby    Keep this paper on your fridge or somewhere else where you can see it.    Call your provider if you have any of these symptoms up to 12 weeks after having your baby.    Thoughts of hurting yourself or your baby  Pain in your chest or trouble breathing  Severe headache not helped by pain medicine  Eyesight concerns (blurry vision, seeing spots or flashes of light, other changes to eyesight)  Fainting, shaking or other signs of a seizure    Call 9-1-1 if you feel that it is an emergency.     The symptoms below can happen to anyone after giving birth. They can be very serious. Call your provider if you have any of these warning signs.    My provider s phone number: _______________________    Losing too much blood (hemorrhage)    Call your provider if you soak through a pad in less than an hour or pass blood clots bigger than a golf ball. These may be signs that you are bleeding too much.    Blood clots in the legs or lungs    After you give birth, your body naturally clots its blood to help prevent blood loss. Sometimes this increased clotting can happen in other areas of the body, like the legs or lungs. This can block your blood flow and be very dangerous.     Call your provider if you:  Have a red, swollen spot on the back of your leg that is warm or painful when you touch it.    Are coughing up blood.     Infection    Call your provider if you have any of these symptoms:  Fever of 100.4 F (38 C) or higher.  Pain or redness around your stitches if you had an incision.   Any yellow, white, or green fluid coming from places where you had stitches or surgery.    Mood Problems (postpartum depression)    Many people feel sad or have mood changes after having a baby. But for some people, these mood swings are worse.     Call your provider right away if you feel so anxious or nervous that you can't care for yourself or your baby.    Preeclampsia (high blood pressure)    Even if you didn't have high blood pressure when you were pregnant, you are at risk for the high blood pressure disease called preeclampsia. This risk can last up to 12 weeks after giving birth.     Call your provider if you have:   Pain on your right side under your rib cage  Sudden swelling in the hands and face    Remember: You know your body. If something doesn't feel right, get medical help.     For informational purposes only. Not to replace the advice of your health care provider. Copyright 2020 [facility]. All rights reserved. Clinically reviewed by Malia Parks, RNC-OB, MSN. [employer] [id number] - Rev 02/23.

## 2023-06-14 NOTE — PROGRESS NOTES
[facility] RN Care Coordinator Note    Kary Sebastian  [id number]  1990    Chart reviewed, discharge follow-up plan discussed with patient, Kary, attending MD, and bedside RN, needs assessed. Post-delivery follow-up and incision check appointment with  planned in 2 weeks at [facility]. Home care nurse visit not ordered by discharging provider. Patient is anxious to leave today to be with  twins who are at [facility].     As requested, handout provided with information and contact phone numbers to assist with adding newborns to patient's MA plan; hospital Resources for Parents handout also provided. Kary is reported to have support at home and feels ready to discharge today, POD#1. RN Care Coordinator will continue to follow and assist with discharge planning as needed.

## 2023-06-16 ENCOUNTER — PATIENT OUTREACH (OUTPATIENT)
Dept: CARE COORDINATION | Facility: CLINIC | Age: 33
End: 2023-06-16
Payer: COMMERCIAL

## 2023-06-16 NOTE — PROGRESS NOTES
[facility]: Post-Discharge Note  SITUATION                                                      Admission:    Admission Date: 06/13/23   Reason for Admission: Fetal growth restriction antepartum Twins  Discharge:   Discharge Date: 06/14/23  Discharge Diagnosis: Admitted for surgery- primary csection- twin gestation with  intrauterine growth retardation.    BACKGROUND                                                      Per hospital discharge summary and inpatient provider notes:  The patient's hospital course was unremarkable.  Babies were transferred to [facility] for NICU availability.  She desires to discharge to be with her infants. She recovered as anticipated and experienced no post-operative complications.  On discharge, her pain was well controlled. Vaginal bleeding is similar to peak menstrual flow.  Voiding without difficulty.  Ambulating well and tolerating a normal diet.  No fever or significant wound drainage.     ASSESSMENT        Discharge Assessment  How are you doing now that you are home?: Was taking a walk to get a slushie when I called; also having a cigarette.  Feeling bloated.  How are your symptoms? (Red Flag symptoms escalate to triage hotline per guidelines): New (No longer pregnant.)  Do you feel your condition is stable enough to be safe at home until your provider visit?: Yes  Does the patient have their discharge instructions? : Yes  Does the patient have questions regarding their discharge instructions? : No  Were you started on any new medications or were there changes to any of your previous medications? : Yes  Does the patient have all of their medications?: Yes  Do you have questions regarding any of your medications? : No  Do you have all of your needed medical supplies or equipment (DME)?  (i.e. oxygen tank, CPAP, cane, etc.): Yes  Discharge follow-up appointment scheduled within 14 calendar days? : No (Patient is going to call Dr Lee for appt when she gets back from her walk.   I suggested that she tell  about her feeling bloated when she calls in case MD wants to see her sooner.)    Post-op ([facility] Only)  If the patient had a surgery or procedure, do they have any questions for a nurse?: No      PLAN                                                      Outpatient Plan:   Discharge diet: Regular   Discharge activity: No heavy lifting for 6 week(s)  No driving or operating machinery while on narcotic analgesics   Discharge follow-up: Follow up with [facility]   in 2 weeks   Wound care: Drink plenty of fluids  Ice to area for comfort  Keep wound clean and dry         No future appointments.      For any urgent concerns, please contact our 24 hour nurse triage line: [phone] ([phone])         YUKO Phillips  [phone]  [facility]

## 2023-06-26 ENCOUNTER — LAB REQUISITION (OUTPATIENT)
Dept: LAB | Facility: CLINIC | Age: 33
End: 2023-06-26
Payer: COMMERCIAL

## 2023-06-26 DIAGNOSIS — Z13.89 ENCOUNTER FOR SCREENING FOR OTHER DISORDER: ICD-10-CM

## 2023-06-26 LAB
ERYTHROCYTE [DISTWIDTH] IN BLOOD BY AUTOMATED COUNT: 14.6 % (ref 10–15)
HCT VFR BLD AUTO: 44.1 % (ref 35–47)
HGB BLD-MCNC: 13.9 G/DL (ref 11.7–15.7)
MCH RBC QN AUTO: 30.1 PG (ref 26.5–33)
MCHC RBC AUTO-ENTMCNC: 31.5 G/DL (ref 31.5–36.5)
MCV RBC AUTO: 96 FL (ref 78–100)
PLATELET # BLD AUTO: 414 10E3/UL (ref 150–450)
RBC # BLD AUTO: 4.62 10E6/UL (ref 3.8–5.2)
WBC # BLD AUTO: 8.8 10E3/UL (ref 4–11)

## 2023-06-26 PROCEDURE — 85027 COMPLETE CBC AUTOMATED: CPT | Performed by: OBSTETRICS & GYNECOLOGY

## 2023-06-26 PROCEDURE — 82728 ASSAY OF FERRITIN: CPT | Performed by: OBSTETRICS & GYNECOLOGY

## 2023-06-27 LAB — FERRITIN SERPL-MCNC: 41 NG/ML (ref 6–175)

## 2023-08-06 NOTE — PROVIDER NOTIFICATION
04/22/23 0222   Provider Notification   Provider Name/Title Dr. Schmidt   Method of Notification At Bedside   Request Evaluate in Person   Notification Reason SVE;Status Update     MD came to re-assess patient due to increased feeling of pressure and pain. SVE done, 4/50/-3. Advised to closely monitor progress of labor.   
   04/22/23 0345   Provider Notification   Provider Name/Title Dr. Schmidt   Method of Notification Electronic Page   Request Evaluate - Remote   Notification Reason Fetal Baseline Change;Status Update     MD updated about FHR baseline changes. MD reviewed the tracing, no intervention needed.   
   04/22/23 0345   Provider Notification   Provider Name/Title Dr. Schmidt   Method of Notification Electronic Page   Request Evaluate - Remote   Notification Reason Fetal Baseline Change;Status Update     MD updated, FHR baseline is decreasing. Patient verbalized reduced in pain, but still feeling the same pressure on her perineum.   
   04/22/23 1130   Provider Notification   Provider Name/Title Dr. Reese   Method of Notification In Department   Notification Reason Status Update       Spoke with Dr. Reese to let him know patient has had small amount of spotting when wiping in bathroom following multiple cervical exams. Plan to continue to monitor.  
   04/23/23 1600   Uterine Activity Assessment   Method palpation;external tocotransducer   Contraction Frequency (Minutes) Irritable   Uterine Resting Tone soft by palpation   Fetal Assessment   Fetal Movement active   Fetal HR Assessment Method external US   Fetal HR (beats/min) 135   Fetal HR Baseline normal range   Fetal HR Variability moderate (amplitude range 6 to 25 bpm)   Fetal Assessment B   Fetal HR Assessment Method B external US     Attempted fetal tracing from [id number]. Unable to provide continuous tracing due to fetal movement. Kary Sebastian emptied bladder, and repositioned. Still unable to trace. Second RN to attempt. Charge nurse called and G3 resident paged. Unable to provide US assistance at this time. TOCO showed uterine irritability. Abdomen soft. Will reattempt when resident is available for US guidance.   
   04/23/23 1745   Provider Notification   Provider Name/Title Dr. Schmidt G3   Method of Notification At Bedside   Request Evaluate in Person   Notification Reason Status Update     Dr. Schmidt at bedside to assist with fetal tracing under assistance of ultrasound. Fetal heartbeat for baby A 120s and baby B 130s. Fetal movement preventing continuous tracing with ultrasound and toco monitors. Plan for BPP with Dr. Winkler on following shift.   
"   04/23/23 0930   Provider Notification   Provider Name/Title Dr. Reese   Method of Notification At Bedside   Request Evaluate in Person   Notification Reason Status Update     0930: Dr. Reese at bedside for daily rounding. Kary Sebastian comfortable and reports resting through the night. Discussed length of stay. Kary Sebastian reports not having a place for her 3 children to stay. Social work consulted and paged.    1030: VS ok to be checked Q8 per Dr. Stephens.    1100: Kary Sebastian at nurse's station in tears explaining situation between the father of her 3 children. Kary Sebastian states \"he isn't going to watch the kids because I should have figured this out before and I'm just trying to take advantage of him\". Kary Sebastian requesting anxiety medication. Spoke with Dr. Reese. Plans to order medication.    1145: Social work at bedside for evaluation and assistance with resources for children.  "
"   04/24/23 0820   Provider Notification   Provider Name/Title Dr. Reese and Dr. Schmidt   Method of Notification At Bedside   Request Evaluate in Person   Notification Reason Status Update     MFM providers at bedside to evaluate Kary Sebastian and discuss plan of care and ultrasound. Kary Sebastian reports infrequent contractions and regular pelvic pressure. But \"feels better\". Plan for fetal monitoring this afternoon, a cervical exam, and possible discharge.  "
No

## 2023-09-11 ENCOUNTER — OFFICE VISIT (OUTPATIENT)
Dept: MIDWIFE SERVICES | Facility: CLINIC | Age: 33
End: 2023-09-11
Payer: COMMERCIAL

## 2023-09-11 VITALS
HEIGHT: 66 IN | WEIGHT: 142.8 LBS | SYSTOLIC BLOOD PRESSURE: 92 MMHG | HEART RATE: 72 BPM | BODY MASS INDEX: 22.95 KG/M2 | DIASTOLIC BLOOD PRESSURE: 60 MMHG

## 2023-09-11 DIAGNOSIS — Z12.4 SCREENING FOR CERVICAL CANCER: Primary | ICD-10-CM

## 2023-09-11 DIAGNOSIS — N92.6 MISSED MENSES: ICD-10-CM

## 2023-09-11 LAB — HCG UR QL: NEGATIVE

## 2023-09-11 PROCEDURE — 99213 OFFICE O/P EST LOW 20 MIN: CPT | Performed by: MIDWIFE

## 2023-09-11 PROCEDURE — 36415 COLL VENOUS BLD VENIPUNCTURE: CPT | Performed by: MIDWIFE

## 2023-09-11 PROCEDURE — G0145 SCR C/V CYTO,THINLAYER,RESCR: HCPCS | Performed by: MIDWIFE

## 2023-09-11 PROCEDURE — 84702 CHORIONIC GONADOTROPIN TEST: CPT | Performed by: MIDWIFE

## 2023-09-11 PROCEDURE — 87624 HPV HI-RISK TYP POOLED RSLT: CPT | Performed by: MIDWIFE

## 2023-09-11 PROCEDURE — 81025 URINE PREGNANCY TEST: CPT | Performed by: MIDWIFE

## 2023-09-11 RX ORDER — DEXTROAMPHETAMINE SACCHARATE, AMPHETAMINE ASPARTATE MONOHYDRATE, DEXTROAMPHETAMINE SULFATE AND AMPHETAMINE SULFATE 2.5; 2.5; 2.5; 2.5 MG/1; MG/1; MG/1; MG/1
10 CAPSULE, EXTENDED RELEASE ORAL DAILY
COMMUNITY
Start: 2023-09-08

## 2023-09-11 RX ORDER — DEXTROAMPHETAMINE SACCHARATE, AMPHETAMINE ASPARTATE, DEXTROAMPHETAMINE SULFATE AND AMPHETAMINE SULFATE 1.25; 1.25; 1.25; 1.25 MG/1; MG/1; MG/1; MG/1
1 TABLET ORAL
COMMUNITY
Start: 2023-09-08

## 2023-09-12 LAB — HCG INTACT+B SERPL-ACNC: <1 MIU/ML

## 2023-09-13 LAB
BKR LAB AP GYN ADEQUACY: NORMAL
BKR LAB AP GYN INTERPRETATION: NORMAL
BKR LAB AP HPV REFLEX: NORMAL
BKR LAB AP LMP: NORMAL
BKR LAB AP PREVIOUS ABNL DX: NORMAL
BKR LAB AP PREVIOUS ABNORMAL: NORMAL
PATH REPORT.COMMENTS IMP SPEC: NORMAL
PATH REPORT.COMMENTS IMP SPEC: NORMAL
PATH REPORT.RELEVANT HX SPEC: NORMAL

## 2023-09-14 LAB
HUMAN PAPILLOMA VIRUS 16 DNA: NEGATIVE
HUMAN PAPILLOMA VIRUS 18 DNA: NEGATIVE
HUMAN PAPILLOMA VIRUS FINAL DIAGNOSIS: NORMAL
HUMAN PAPILLOMA VIRUS OTHER HR: NEGATIVE

## 2023-09-18 ENCOUNTER — PATIENT OUTREACH (OUTPATIENT)
Dept: MIDWIFE SERVICES | Facility: CLINIC | Age: 33
End: 2023-09-18
Payer: COMMERCIAL

## 2023-09-29 ENCOUNTER — LAB REQUISITION (OUTPATIENT)
Dept: LAB | Facility: CLINIC | Age: 33
End: 2023-09-29

## 2023-09-29 DIAGNOSIS — N93.9 ABNORMAL UTERINE AND VAGINAL BLEEDING, UNSPECIFIED: ICD-10-CM

## 2023-09-29 LAB
ERYTHROCYTE [DISTWIDTH] IN BLOOD BY AUTOMATED COUNT: 13.4 % (ref 10–15)
FERRITIN SERPL-MCNC: 18 NG/ML (ref 6–175)
HCT VFR BLD AUTO: 38.6 % (ref 35–47)
HGB BLD-MCNC: 12.3 G/DL (ref 11.7–15.7)
MCH RBC QN AUTO: 29.9 PG (ref 26.5–33)
MCHC RBC AUTO-ENTMCNC: 31.9 G/DL (ref 31.5–36.5)
MCV RBC AUTO: 94 FL (ref 78–100)
PLATELET # BLD AUTO: 345 10E3/UL (ref 150–450)
PROLACTIN SERPL 3RD IS-MCNC: 4 NG/ML (ref 5–23)
RBC # BLD AUTO: 4.12 10E6/UL (ref 3.8–5.2)
TSH SERPL DL<=0.005 MIU/L-ACNC: 0.95 UIU/ML (ref 0.3–4.2)
WBC # BLD AUTO: 5.4 10E3/UL (ref 4–11)

## 2023-09-29 PROCEDURE — 84146 ASSAY OF PROLACTIN: CPT | Performed by: OBSTETRICS & GYNECOLOGY

## 2023-09-29 PROCEDURE — 82728 ASSAY OF FERRITIN: CPT | Performed by: OBSTETRICS & GYNECOLOGY

## 2023-09-29 PROCEDURE — 85027 COMPLETE CBC AUTOMATED: CPT | Performed by: OBSTETRICS & GYNECOLOGY

## 2023-09-29 PROCEDURE — 84443 ASSAY THYROID STIM HORMONE: CPT | Performed by: OBSTETRICS & GYNECOLOGY

## 2023-10-24 ENCOUNTER — ANESTHESIA EVENT (OUTPATIENT)
Dept: SURGERY | Facility: HOSPITAL | Age: 33
End: 2023-10-24
Payer: COMMERCIAL

## 2023-10-25 ENCOUNTER — ANESTHESIA (OUTPATIENT)
Dept: SURGERY | Facility: HOSPITAL | Age: 33
End: 2023-10-25
Payer: COMMERCIAL

## 2023-10-25 ENCOUNTER — HOSPITAL ENCOUNTER (OUTPATIENT)
Facility: HOSPITAL | Age: 33
Discharge: HOME | End: 2023-10-25
Attending: OBSTETRICS & GYNECOLOGY | Admitting: OBSTETRICS & GYNECOLOGY
Payer: COMMERCIAL

## 2023-10-25 ENCOUNTER — TRANSFERRED RECORDS (OUTPATIENT)
Dept: HEALTH INFORMATION MANAGEMENT | Facility: CLINIC | Age: 33
End: 2023-10-25

## 2023-10-25 VITALS
BODY MASS INDEX: 22.53 KG/M2 | SYSTOLIC BLOOD PRESSURE: 108 MMHG | OXYGEN SATURATION: 99 % | WEIGHT: 139.6 LBS | RESPIRATION RATE: 20 BRPM | DIASTOLIC BLOOD PRESSURE: 73 MMHG | HEART RATE: 83 BPM | TEMPERATURE: 97.6 F

## 2023-10-25 DIAGNOSIS — D25.9 UTERINE LEIOMYOMA, UNSPECIFIED LOCATION: Primary | ICD-10-CM

## 2023-10-25 LAB
HCG UR QL: NEGATIVE
HGB BLD-MCNC: 12.6 G/DL (ref 11.7–15.7)

## 2023-10-25 PROCEDURE — 360N000076 HC SURGERY LEVEL 3, PER MIN: Performed by: OBSTETRICS & GYNECOLOGY

## 2023-10-25 PROCEDURE — 250N000009 HC RX 250: Performed by: NURSE ANESTHETIST, CERTIFIED REGISTERED

## 2023-10-25 PROCEDURE — 710N000012 HC RECOVERY PHASE 2, PER MINUTE: Performed by: OBSTETRICS & GYNECOLOGY

## 2023-10-25 PROCEDURE — 250N000011 HC RX IP 250 OP 636: Performed by: OBSTETRICS & GYNECOLOGY

## 2023-10-25 PROCEDURE — 258N000003 HC RX IP 258 OP 636: Performed by: ANESTHESIOLOGY

## 2023-10-25 PROCEDURE — 710N000009 HC RECOVERY PHASE 1, LEVEL 1, PER MIN: Performed by: OBSTETRICS & GYNECOLOGY

## 2023-10-25 PROCEDURE — 370N000017 HC ANESTHESIA TECHNICAL FEE, PER MIN: Performed by: OBSTETRICS & GYNECOLOGY

## 2023-10-25 PROCEDURE — 250N000013 HC RX MED GY IP 250 OP 250 PS 637: Performed by: PHYSICIAN ASSISTANT

## 2023-10-25 PROCEDURE — 999N000141 HC STATISTIC PRE-PROCEDURE NURSING ASSESSMENT: Performed by: OBSTETRICS & GYNECOLOGY

## 2023-10-25 PROCEDURE — 250N000011 HC RX IP 250 OP 636: Mod: JZ | Performed by: ANESTHESIOLOGY

## 2023-10-25 PROCEDURE — 250N000011 HC RX IP 250 OP 636: Mod: JZ | Performed by: NURSE ANESTHETIST, CERTIFIED REGISTERED

## 2023-10-25 PROCEDURE — 36415 COLL VENOUS BLD VENIPUNCTURE: CPT | Performed by: PHYSICIAN ASSISTANT

## 2023-10-25 PROCEDURE — 88305 TISSUE EXAM BY PATHOLOGIST: CPT | Mod: TC | Performed by: OBSTETRICS & GYNECOLOGY

## 2023-10-25 PROCEDURE — 88305 TISSUE EXAM BY PATHOLOGIST: CPT | Mod: 26 | Performed by: PATHOLOGY

## 2023-10-25 PROCEDURE — 250N000011 HC RX IP 250 OP 636: Performed by: NURSE ANESTHETIST, CERTIFIED REGISTERED

## 2023-10-25 PROCEDURE — 250N000025 HC SEVOFLURANE, PER MIN: Performed by: OBSTETRICS & GYNECOLOGY

## 2023-10-25 PROCEDURE — 272N000001 HC OR GENERAL SUPPLY STERILE: Performed by: OBSTETRICS & GYNECOLOGY

## 2023-10-25 PROCEDURE — 85018 HEMOGLOBIN: CPT | Performed by: PHYSICIAN ASSISTANT

## 2023-10-25 PROCEDURE — 81025 URINE PREGNANCY TEST: CPT | Performed by: PHYSICIAN ASSISTANT

## 2023-10-25 RX ORDER — MAGNESIUM SULFATE 4 G/50ML
4 INJECTION INTRAVENOUS ONCE
Status: COMPLETED | OUTPATIENT
Start: 2023-10-25 | End: 2023-10-25

## 2023-10-25 RX ORDER — ACETAMINOPHEN 325 MG/1
975 TABLET ORAL ONCE
Status: CANCELLED | OUTPATIENT
Start: 2023-10-26 | End: 2023-10-26

## 2023-10-25 RX ORDER — ONDANSETRON 2 MG/ML
INJECTION INTRAMUSCULAR; INTRAVENOUS PRN
Status: DISCONTINUED | OUTPATIENT
Start: 2023-10-25 | End: 2023-10-25

## 2023-10-25 RX ORDER — HALOPERIDOL 5 MG/ML
1 INJECTION INTRAMUSCULAR
Status: CANCELLED | OUTPATIENT
Start: 2023-10-25

## 2023-10-25 RX ORDER — TIZANIDINE 2 MG/1
2 TABLET ORAL 3 TIMES DAILY PRN
COMMUNITY

## 2023-10-25 RX ORDER — IBUPROFEN 200 MG
800 TABLET ORAL ONCE
Status: CANCELLED | OUTPATIENT
Start: 2023-10-26 | End: 2023-10-26

## 2023-10-25 RX ORDER — LIDOCAINE 40 MG/G
CREAM TOPICAL
Status: DISCONTINUED | OUTPATIENT
Start: 2023-10-25 | End: 2023-10-25 | Stop reason: HOSPADM

## 2023-10-25 RX ORDER — BENZONATATE 200 MG/1
200 CAPSULE ORAL 3 TIMES DAILY PRN
COMMUNITY

## 2023-10-25 RX ORDER — HYDROMORPHONE HYDROCHLORIDE 1 MG/ML
0.4 INJECTION, SOLUTION INTRAMUSCULAR; INTRAVENOUS; SUBCUTANEOUS EVERY 5 MIN PRN
Status: CANCELLED | OUTPATIENT
Start: 2023-10-25

## 2023-10-25 RX ORDER — OXYCODONE HYDROCHLORIDE 5 MG/1
5 TABLET ORAL
Status: CANCELLED | OUTPATIENT
Start: 2023-10-25

## 2023-10-25 RX ORDER — AMMONIUM LACTATE 5 %
LOTION (GRAM) TOPICAL 2 TIMES DAILY
Status: ON HOLD | COMMUNITY
End: 2023-10-25

## 2023-10-25 RX ORDER — HYDROMORPHONE HYDROCHLORIDE 2 MG/1
2-4 TABLET ORAL EVERY 4 HOURS PRN
Qty: 12 TABLET | Refills: 0 | Status: SHIPPED | OUTPATIENT
Start: 2023-10-25

## 2023-10-25 RX ORDER — PROPOFOL 10 MG/ML
INJECTION, EMULSION INTRAVENOUS CONTINUOUS PRN
Status: DISCONTINUED | OUTPATIENT
Start: 2023-10-25 | End: 2023-10-25

## 2023-10-25 RX ORDER — LORAZEPAM 2 MG/ML
.5-1 INJECTION INTRAMUSCULAR
Status: CANCELLED | OUTPATIENT
Start: 2023-10-25

## 2023-10-25 RX ORDER — ONDANSETRON 4 MG/1
4 TABLET, ORALLY DISINTEGRATING ORAL EVERY 30 MIN PRN
Status: CANCELLED | OUTPATIENT
Start: 2023-10-25

## 2023-10-25 RX ORDER — LIDOCAINE HYDROCHLORIDE 10 MG/ML
INJECTION, SOLUTION INFILTRATION; PERINEURAL PRN
Status: DISCONTINUED | OUTPATIENT
Start: 2023-10-25 | End: 2023-10-25

## 2023-10-25 RX ORDER — GLYCOPYRROLATE 0.2 MG/ML
INJECTION, SOLUTION INTRAMUSCULAR; INTRAVENOUS PRN
Status: DISCONTINUED | OUTPATIENT
Start: 2023-10-25 | End: 2023-10-25

## 2023-10-25 RX ORDER — ALPRAZOLAM 0.5 MG/1
0.5 TABLET, EXTENDED RELEASE ORAL EVERY MORNING
COMMUNITY

## 2023-10-25 RX ORDER — ONDANSETRON 2 MG/ML
4 INJECTION INTRAMUSCULAR; INTRAVENOUS EVERY 30 MIN PRN
Status: CANCELLED | OUTPATIENT
Start: 2023-10-25

## 2023-10-25 RX ORDER — MORPHINE SULFATE 4 MG/ML
2 INJECTION, SOLUTION INTRAMUSCULAR; INTRAVENOUS EVERY 30 MIN PRN
Status: CANCELLED | OUTPATIENT
Start: 2023-10-25

## 2023-10-25 RX ORDER — ACETAMINOPHEN 325 MG/1
975 TABLET ORAL ONCE
Status: COMPLETED | OUTPATIENT
Start: 2023-10-25 | End: 2023-10-25

## 2023-10-25 RX ORDER — ACETAMINOPHEN 325 MG/1
975 TABLET ORAL ONCE
Status: DISCONTINUED | OUTPATIENT
Start: 2023-10-25 | End: 2023-10-25

## 2023-10-25 RX ORDER — HYDROMORPHONE HYDROCHLORIDE 1 MG/ML
0.2 INJECTION, SOLUTION INTRAMUSCULAR; INTRAVENOUS; SUBCUTANEOUS EVERY 5 MIN PRN
Status: CANCELLED | OUTPATIENT
Start: 2023-10-25

## 2023-10-25 RX ORDER — BUPIVACAINE HYDROCHLORIDE 2.5 MG/ML
INJECTION, SOLUTION INFILTRATION; PERINEURAL PRN
Status: DISCONTINUED | OUTPATIENT
Start: 2023-10-25 | End: 2023-10-25 | Stop reason: HOSPADM

## 2023-10-25 RX ORDER — FENTANYL CITRATE 50 UG/ML
INJECTION, SOLUTION INTRAMUSCULAR; INTRAVENOUS PRN
Status: DISCONTINUED | OUTPATIENT
Start: 2023-10-25 | End: 2023-10-25

## 2023-10-25 RX ORDER — ARIPIPRAZOLE 2 MG/1
2 TABLET ORAL DAILY
COMMUNITY

## 2023-10-25 RX ORDER — DEXAMETHASONE SODIUM PHOSPHATE 10 MG/ML
INJECTION, SOLUTION INTRAMUSCULAR; INTRAVENOUS PRN
Status: DISCONTINUED | OUTPATIENT
Start: 2023-10-25 | End: 2023-10-25

## 2023-10-25 RX ORDER — AMMONIUM LACTATE 12 G/100G
LOTION TOPICAL 2 TIMES DAILY
COMMUNITY

## 2023-10-25 RX ORDER — FERRIC CARBOXYMALTOSE INJECTION 50 MG/ML
750 INJECTION, SOLUTION INTRAVENOUS
Status: ON HOLD | COMMUNITY
End: 2023-10-25

## 2023-10-25 RX ORDER — PROPOFOL 10 MG/ML
INJECTION, EMULSION INTRAVENOUS PRN
Status: DISCONTINUED | OUTPATIENT
Start: 2023-10-25 | End: 2023-10-25

## 2023-10-25 RX ORDER — SODIUM CHLORIDE, SODIUM LACTATE, POTASSIUM CHLORIDE, CALCIUM CHLORIDE 600; 310; 30; 20 MG/100ML; MG/100ML; MG/100ML; MG/100ML
INJECTION, SOLUTION INTRAVENOUS CONTINUOUS
Status: CANCELLED | OUTPATIENT
Start: 2023-10-25

## 2023-10-25 RX ORDER — SODIUM CHLORIDE, SODIUM LACTATE, POTASSIUM CHLORIDE, CALCIUM CHLORIDE 600; 310; 30; 20 MG/100ML; MG/100ML; MG/100ML; MG/100ML
INJECTION, SOLUTION INTRAVENOUS CONTINUOUS
Status: DISCONTINUED | OUTPATIENT
Start: 2023-10-25 | End: 2023-10-25 | Stop reason: HOSPADM

## 2023-10-25 RX ORDER — HYDROMORPHONE HYDROCHLORIDE 2 MG/1
2 TABLET ORAL
Status: DISCONTINUED | OUTPATIENT
Start: 2023-10-25 | End: 2023-10-25 | Stop reason: HOSPADM

## 2023-10-25 RX ADMIN — FENTANYL CITRATE 100 MCG: 50 INJECTION INTRAMUSCULAR; INTRAVENOUS at 17:19

## 2023-10-25 RX ADMIN — PROPOFOL 200 MCG/KG/MIN: 10 INJECTION, EMULSION INTRAVENOUS at 17:34

## 2023-10-25 RX ADMIN — ROCURONIUM BROMIDE 50 MG: 50 INJECTION, SOLUTION INTRAVENOUS at 17:19

## 2023-10-25 RX ADMIN — GLYCOPYRROLATE 0.2 MG: 0.2 INJECTION INTRAMUSCULAR; INTRAVENOUS at 17:30

## 2023-10-25 RX ADMIN — DEXAMETHASONE SODIUM PHOSPHATE 10 MG: 10 INJECTION, SOLUTION INTRAMUSCULAR; INTRAVENOUS at 17:19

## 2023-10-25 RX ADMIN — MAGNESIUM SULFATE HEPTAHYDRATE 4 G: 80 INJECTION, SOLUTION INTRAVENOUS at 14:00

## 2023-10-25 RX ADMIN — MIDAZOLAM 2 MG: 1 INJECTION INTRAMUSCULAR; INTRAVENOUS at 17:13

## 2023-10-25 RX ADMIN — LIDOCAINE HYDROCHLORIDE 3 ML: 10 INJECTION, SOLUTION INFILTRATION; PERINEURAL at 17:19

## 2023-10-25 RX ADMIN — ONDANSETRON 4 MG: 2 INJECTION INTRAMUSCULAR; INTRAVENOUS at 17:45

## 2023-10-25 RX ADMIN — SODIUM CHLORIDE, POTASSIUM CHLORIDE, SODIUM LACTATE AND CALCIUM CHLORIDE: 600; 310; 30; 20 INJECTION, SOLUTION INTRAVENOUS at 17:54

## 2023-10-25 RX ADMIN — SODIUM CHLORIDE, POTASSIUM CHLORIDE, SODIUM LACTATE AND CALCIUM CHLORIDE: 600; 310; 30; 20 INJECTION, SOLUTION INTRAVENOUS at 15:48

## 2023-10-25 RX ADMIN — PROPOFOL 200 MG: 10 INJECTION, EMULSION INTRAVENOUS at 17:19

## 2023-10-25 RX ADMIN — SUGAMMADEX 300 MG: 100 INJECTION, SOLUTION INTRAVENOUS at 17:54

## 2023-10-25 RX ADMIN — ACETAMINOPHEN 975 MG: 325 TABLET ORAL at 14:03

## 2023-10-25 ASSESSMENT — ACTIVITIES OF DAILY LIVING (ADL)
ADLS_ACUITY_SCORE: 18
ADLS_ACUITY_SCORE: 35
ADLS_ACUITY_SCORE: 18
ADLS_ACUITY_SCORE: 18

## 2023-10-25 ASSESSMENT — LIFESTYLE VARIABLES: TOBACCO_USE: 1

## 2023-10-25 NOTE — ANESTHESIA PROCEDURE NOTES
Airway       Patient location during procedure: OR       Procedure Start/Stop Times: 10/25/2023 5:21 PM  Staff -        Anesthesiologist:  Chloe Watson MD       CRNA: Paris Majano APRN CRNA       Performed By: CRNA  Consent for Airway        Urgency: elective  Indications and Patient Condition       Indications for airway management: ariadna-procedural       Induction type:intravenous       Mask difficulty assessment: 1 - vent by mask    Final Airway Details       Final airway type: endotracheal airway       Successful airway: ETT - single  Endotracheal Airway Details        ETT size (mm): 7.0       Cuffed: yes       Successful intubation technique: direct laryngoscopy       DL Blade Type: Caba 2       Grade View of Cords: 1       Adjucts: stylet       Measured from: gums/teeth       Secured at (cm): 20       Bite block used: None    Post intubation assessment        Placement verified by: capnometry, equal breath sounds and chest rise        Number of attempts at approach: 1       Secured with: silk tape       Ease of procedure: easy       Dentition: Intact and Unchanged       Dental guard used and removed. Dental Guard Type: Standard White.    Medication(s) Administered   Medication Administration Time: 10/25/2023 5:21 PM

## 2023-10-25 NOTE — ANESTHESIA CARE TRANSFER NOTE
Patient: Kary Sebastian    Procedure: Procedure(s):  DIAGNOSTIC LAPAROSCOPY  HYSTEROSCOPY DILATION AND CURETTAGE       Diagnosis: Pain in pelvis [R10.2]  Diagnosis Additional Information: No value filed.    Anesthesia Type:   General     Note:    Oropharynx: oropharynx clear of all foreign objects and spontaneously breathing  Level of Consciousness: awake and drowsy  Oxygen Supplementation: room air    Independent Airway: airway patency satisfactory and stable  Dentition: dentition unchanged  Vital Signs Stable: post-procedure vital signs reviewed and stable  Report to RN Given: handoff report given  Patient transferred to: PACU    Handoff Report: Identifed the Patient, Identified the Reponsible Provider, Reviewed the pertinent medical history, Discussed the surgical course, Reviewed Intra-OP anesthesia mangement and issues during anesthesia, Set expectations for post-procedure period and Allowed opportunity for questions and acknowledgement of understanding      Vitals:  Vitals Value Taken Time   /68 10/25/23  1803   Temp 97.6 F 10/25/23  1803   Pulse 90 10/25/23  1803   Resp 16 10/25/23  1803   SpO2 100% 10/25/23  1803       Electronically Signed By: KWAKU Montaño CRNA  October 25, 2023  6:06 PM
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
## 2023-10-25 NOTE — BRIEF OP NOTE
[facility]    Brief Operative Note    Pre-operative diagnosis: Pain in pelvis [R10.2]  Post-operative diagnosis Uterine fibroids    Procedure: DIAGNOSTIC LAPAROSCOPY, N/A - Abdomen  HYSTEROSCOPY DILATION AND CURETTAGE, N/A - Vagina    Surgeon: Surgeon(s) and Role:     * Geremias Lee MD - Primary  Anesthesia: General   Estimated Blood Loss: Minimal    Drains: None  Specimens: * No specimens in log *  Findings:   None.  Complications: None.  Implants: * No implants in log *

## 2023-10-25 NOTE — H&P
History and Physical Update    I have examined the patient and reviewed the history and physical that is present on this chart. The changes in the patient's history and physical condition are as follows:    None    Geremias Lee MD

## 2023-10-27 LAB
PATH REPORT.COMMENTS IMP SPEC: NORMAL
PATH REPORT.COMMENTS IMP SPEC: NORMAL
PATH REPORT.FINAL DX SPEC: NORMAL
PATH REPORT.GROSS SPEC: NORMAL
PATH REPORT.MICROSCOPIC SPEC OTHER STN: NORMAL
PATH REPORT.RELEVANT HX SPEC: NORMAL
PHOTO IMAGE: NORMAL

## 2023-11-02 NOTE — OP NOTE
PROCEDURE NOTE - LAPAROSCOPY, HYSTEROSCOPY AND DILATION AND CURETTAGE     NAME: Kary Sebastian   :  1990   MRN: [id number]    [facility]    DATE OF SERVICE: 10/25/2023     PREOPERATIVE DIAGNOSIS:   pelvic pain, abnormal uterine bleeding    POSTOPERATIVE DIAGNOSIS:  uterine fibroid    PROCEDURES: laparoscopy,  hysteroscopy, lysis of adhesions, and dilation and curettage    SURGEON: Geremias Lee MD     ASSISTANT:  Colette Patrick    ANESTHESIA: general     ESTIMATED BLOOD LOSS: 5cc    HISTORY OF PRESENT ILLNESS:   Kary Sebastian is a 33 year old female with history of progressive pelvic pain with continued irregular spotting.  Imaging failed to identify any precipitous cause.    CONSENT:  She was met preoperatively, where we discussed the procedure and the risks associated with the procedure. She understood these to be, but not limited to injury to adjacent organs including bladder, bowel, ureter, infection and bleeding. Patient signed consent.    PROCEDURE  Patient was  brought back to the operating room in stable condition. Patient underwent induction of a general anesthetic, she was carefully prepped and draped in sterile fashion in the dorsal lithotomy position. Timeout was performed. Bladder was drained with a Alicea catheter, uterine manipulator placed into the uterus.     Attention was turned to the abdomen. An incision above the umbilicus. A Veress needle was introduced with a 2 pop technique, saline drop test confirmed adequate placement. Opening pressure was 3-4. Insufflation was done until 15mm of pressure were established. A 5 nonbladed trocar was placed above the umbilicus. Two more port sights were place in the right and left lower quadrants under direct visualization. Trandelenburg assistance was then used.     The procedure began with identifying the anatomy. The uterus was visualized was found to  have multiple small fibroids located over the fundus , tubes appeared  "normal, ovaries were  \"normal.     There were filmy adhesions to the adnexa which were easily taken down.    The trocars were then removed and the gas was removed from the abdomen. Skin was closed with 4-0 vicryl suture. Steri-Strips applied. Procedure was then turned to the lower field. A bimanual exam was done, demonstrating an anteverted uterus. The anterior lip of the cervix was regrasped with single tooth tenaculum. Uterus was then sounded to 8 cm cm. The cervix was gently dilated using Kristie dilators and the hysteroscope was introduced. Cavity of the uterus was noted to be uniform and homogenous without evidence of polyps or submucosal fibroids. At this point endometrial curettings were obtained. In each case all quadrants were sampled, and the tissue was submitted for pathologic exam. At this point good hemostasis was noted with this portion of the procedure. Instruments were removed from vagina. No active bleeding was noted. Sponge and needle counts were correct X 2. She was taken to recovery in stable condition.    Geremias Lee MD  Urogynecology and Reconstructive Pelvic Surgery      CC: Ashlee Drake, Geremias Lee MD   "

## 2023-11-20 ENCOUNTER — TRANSFERRED RECORDS (OUTPATIENT)
Dept: HEALTH INFORMATION MANAGEMENT | Facility: CLINIC | Age: 33
End: 2023-11-20
Payer: COMMERCIAL

## 2023-11-27 ENCOUNTER — TRANSFERRED RECORDS (OUTPATIENT)
Dept: HEALTH INFORMATION MANAGEMENT | Facility: CLINIC | Age: 33
End: 2023-11-27
Payer: COMMERCIAL

## 2023-12-19 RX ORDER — CHOLECALCIFEROL (VITAMIN D3) 1250 MCG
1250 CAPSULE ORAL
COMMUNITY

## 2023-12-19 RX ORDER — METRONIDAZOLE 500 MG/1
500 TABLET ORAL 3 TIMES DAILY
COMMUNITY

## 2023-12-19 RX ORDER — FLUTICASONE PROPIONATE AND SALMETEROL 250; 50 UG/1; UG/1
1 POWDER RESPIRATORY (INHALATION) EVERY 12 HOURS
COMMUNITY
End: 2024-07-16

## 2023-12-19 RX ORDER — METRONIDAZOLE 7.5 MG/G
GEL VAGINAL DAILY
COMMUNITY

## 2023-12-20 ENCOUNTER — HOSPITAL ENCOUNTER (OUTPATIENT)
Facility: HOSPITAL | Age: 33
Discharge: HOME | End: 2023-12-20
Attending: OBSTETRICS & GYNECOLOGY | Admitting: OBSTETRICS & GYNECOLOGY
Payer: COMMERCIAL

## 2023-12-20 ENCOUNTER — ANESTHESIA EVENT (OUTPATIENT)
Dept: SURGERY | Facility: HOSPITAL | Age: 33
End: 2023-12-20
Payer: COMMERCIAL

## 2023-12-20 ENCOUNTER — ANESTHESIA (OUTPATIENT)
Dept: SURGERY | Facility: HOSPITAL | Age: 33
End: 2023-12-20
Payer: COMMERCIAL

## 2023-12-20 VITALS
HEART RATE: 68 BPM | OXYGEN SATURATION: 100 % | RESPIRATION RATE: 18 BRPM | TEMPERATURE: 97 F | WEIGHT: 135.31 LBS | HEIGHT: 66 IN | BODY MASS INDEX: 21.75 KG/M2 | SYSTOLIC BLOOD PRESSURE: 118 MMHG | DIASTOLIC BLOOD PRESSURE: 70 MMHG

## 2023-12-20 DIAGNOSIS — N80.03 ADENOMYOSIS OF THE UTERUS: Primary | ICD-10-CM

## 2023-12-20 LAB
HCG UR QL: NEGATIVE
HGB BLD-MCNC: 10.4 G/DL (ref 11.7–15.7)

## 2023-12-20 PROCEDURE — 250N000011 HC RX IP 250 OP 636: Mod: JZ | Performed by: OBSTETRICS & GYNECOLOGY

## 2023-12-20 PROCEDURE — 81025 URINE PREGNANCY TEST: CPT | Performed by: PHYSICIAN ASSISTANT

## 2023-12-20 PROCEDURE — 258N000003 HC RX IP 258 OP 636: Performed by: ANESTHESIOLOGY

## 2023-12-20 PROCEDURE — 250N000012 HC RX MED GY IP 250 OP 636 PS 637: Performed by: ANESTHESIOLOGY

## 2023-12-20 PROCEDURE — 250N000011 HC RX IP 250 OP 636: Mod: JZ | Performed by: ANESTHESIOLOGY

## 2023-12-20 PROCEDURE — 360N000080 HC SURGERY LEVEL 7, PER MIN: Performed by: OBSTETRICS & GYNECOLOGY

## 2023-12-20 PROCEDURE — 999N000141 HC STATISTIC PRE-PROCEDURE NURSING ASSESSMENT: Performed by: OBSTETRICS & GYNECOLOGY

## 2023-12-20 PROCEDURE — 250N000009 HC RX 250: Performed by: ANESTHESIOLOGY

## 2023-12-20 PROCEDURE — 258N000003 HC RX IP 258 OP 636: Performed by: OBSTETRICS & GYNECOLOGY

## 2023-12-20 PROCEDURE — 85018 HEMOGLOBIN: CPT | Performed by: PHYSICIAN ASSISTANT

## 2023-12-20 PROCEDURE — 36415 COLL VENOUS BLD VENIPUNCTURE: CPT | Performed by: PHYSICIAN ASSISTANT

## 2023-12-20 PROCEDURE — 250N000025 HC SEVOFLURANE, PER MIN: Performed by: OBSTETRICS & GYNECOLOGY

## 2023-12-20 PROCEDURE — 272N000001 HC OR GENERAL SUPPLY STERILE: Performed by: OBSTETRICS & GYNECOLOGY

## 2023-12-20 PROCEDURE — 250N000013 HC RX MED GY IP 250 OP 250 PS 637: Performed by: ANESTHESIOLOGY

## 2023-12-20 PROCEDURE — 710N000009 HC RECOVERY PHASE 1, LEVEL 1, PER MIN: Performed by: OBSTETRICS & GYNECOLOGY

## 2023-12-20 PROCEDURE — 250N000013 HC RX MED GY IP 250 OP 250 PS 637: Performed by: PHYSICIAN ASSISTANT

## 2023-12-20 PROCEDURE — 250N000011 HC RX IP 250 OP 636: Performed by: NURSE ANESTHETIST, CERTIFIED REGISTERED

## 2023-12-20 PROCEDURE — C1765 ADHESION BARRIER: HCPCS | Performed by: OBSTETRICS & GYNECOLOGY

## 2023-12-20 PROCEDURE — 250N000013 HC RX MED GY IP 250 OP 250 PS 637: Performed by: OBSTETRICS & GYNECOLOGY

## 2023-12-20 PROCEDURE — 710N000012 HC RECOVERY PHASE 2, PER MINUTE: Performed by: OBSTETRICS & GYNECOLOGY

## 2023-12-20 PROCEDURE — 370N000017 HC ANESTHESIA TECHNICAL FEE, PER MIN: Performed by: OBSTETRICS & GYNECOLOGY

## 2023-12-20 RX ORDER — CEFAZOLIN SODIUM/WATER 2 G/20 ML
SYRINGE (ML) INTRAVENOUS PRN
Status: DISCONTINUED | OUTPATIENT
Start: 2023-12-20 | End: 2023-12-20

## 2023-12-20 RX ORDER — OXYCODONE HYDROCHLORIDE 5 MG/1
5 TABLET ORAL
Status: DISCONTINUED | OUTPATIENT
Start: 2023-12-20 | End: 2023-12-20 | Stop reason: HOSPADM

## 2023-12-20 RX ORDER — ONDANSETRON 2 MG/ML
4 INJECTION INTRAMUSCULAR; INTRAVENOUS EVERY 30 MIN PRN
Status: DISCONTINUED | OUTPATIENT
Start: 2023-12-20 | End: 2023-12-20 | Stop reason: HOSPADM

## 2023-12-20 RX ORDER — ALBUTEROL SULFATE 90 UG/1
AEROSOL, METERED RESPIRATORY (INHALATION) PRN
Status: DISCONTINUED | OUTPATIENT
Start: 2023-12-20 | End: 2023-12-20

## 2023-12-20 RX ORDER — PROPOFOL 10 MG/ML
INJECTION, EMULSION INTRAVENOUS PRN
Status: DISCONTINUED | OUTPATIENT
Start: 2023-12-20 | End: 2023-12-20

## 2023-12-20 RX ORDER — OXYCODONE HYDROCHLORIDE 5 MG/1
5-10 TABLET ORAL EVERY 4 HOURS PRN
Qty: 12 TABLET | Refills: 0 | Status: SHIPPED | OUTPATIENT
Start: 2023-12-20

## 2023-12-20 RX ORDER — LIDOCAINE 40 MG/G
CREAM TOPICAL
Status: DISCONTINUED | OUTPATIENT
Start: 2023-12-20 | End: 2023-12-20 | Stop reason: HOSPADM

## 2023-12-20 RX ORDER — BUPIVACAINE HYDROCHLORIDE 2.5 MG/ML
INJECTION, SOLUTION INFILTRATION; PERINEURAL PRN
Status: DISCONTINUED | OUTPATIENT
Start: 2023-12-20 | End: 2023-12-20 | Stop reason: HOSPADM

## 2023-12-20 RX ORDER — OXYCODONE HYDROCHLORIDE 5 MG/1
10 TABLET ORAL
Status: DISCONTINUED | OUTPATIENT
Start: 2023-12-20 | End: 2023-12-20 | Stop reason: HOSPADM

## 2023-12-20 RX ORDER — ONDANSETRON 4 MG/1
4 TABLET, ORALLY DISINTEGRATING ORAL EVERY 30 MIN PRN
Status: DISCONTINUED | OUTPATIENT
Start: 2023-12-20 | End: 2023-12-20 | Stop reason: HOSPADM

## 2023-12-20 RX ORDER — SODIUM CHLORIDE, SODIUM LACTATE, POTASSIUM CHLORIDE, CALCIUM CHLORIDE 600; 310; 30; 20 MG/100ML; MG/100ML; MG/100ML; MG/100ML
INJECTION, SOLUTION INTRAVENOUS CONTINUOUS
Status: DISCONTINUED | OUTPATIENT
Start: 2023-12-20 | End: 2023-12-20 | Stop reason: HOSPADM

## 2023-12-20 RX ORDER — HYDROMORPHONE HYDROCHLORIDE 1 MG/ML
0.4 INJECTION, SOLUTION INTRAMUSCULAR; INTRAVENOUS; SUBCUTANEOUS EVERY 5 MIN PRN
Status: DISCONTINUED | OUTPATIENT
Start: 2023-12-20 | End: 2023-12-20

## 2023-12-20 RX ORDER — ALBUTEROL SULFATE 0.83 MG/ML
2.5 SOLUTION RESPIRATORY (INHALATION)
Status: DISCONTINUED | OUTPATIENT
Start: 2023-12-20 | End: 2023-12-20 | Stop reason: HOSPADM

## 2023-12-20 RX ORDER — HALOPERIDOL 5 MG/ML
1 INJECTION INTRAMUSCULAR
Status: DISCONTINUED | OUTPATIENT
Start: 2023-12-20 | End: 2023-12-20 | Stop reason: HOSPADM

## 2023-12-20 RX ORDER — LABETALOL HYDROCHLORIDE 5 MG/ML
5 INJECTION, SOLUTION INTRAVENOUS EVERY 10 MIN PRN
Status: DISCONTINUED | OUTPATIENT
Start: 2023-12-20 | End: 2023-12-20 | Stop reason: HOSPADM

## 2023-12-20 RX ORDER — ACETAMINOPHEN 325 MG/1
975 TABLET ORAL ONCE
Status: DISCONTINUED | OUTPATIENT
Start: 2023-12-20 | End: 2023-12-20 | Stop reason: HOSPADM

## 2023-12-20 RX ORDER — SODIUM CHLORIDE, SODIUM LACTATE, POTASSIUM CHLORIDE, AND CALCIUM CHLORIDE .6; .31; .03; .02 G/100ML; G/100ML; G/100ML; G/100ML
IRRIGANT IRRIGATION PRN
Status: DISCONTINUED | OUTPATIENT
Start: 2023-12-20 | End: 2023-12-20 | Stop reason: HOSPADM

## 2023-12-20 RX ORDER — APREPITANT 40 MG/1
40 CAPSULE ORAL ONCE
Status: COMPLETED | OUTPATIENT
Start: 2023-12-20 | End: 2023-12-20

## 2023-12-20 RX ORDER — ALPRAZOLAM 0.5 MG/1
0.5 TABLET, EXTENDED RELEASE ORAL DAILY
Status: DISCONTINUED | OUTPATIENT
Start: 2023-12-20 | End: 2023-12-20 | Stop reason: ALTCHOICE

## 2023-12-20 RX ORDER — KETAMINE HYDROCHLORIDE 10 MG/ML
INJECTION INTRAMUSCULAR; INTRAVENOUS PRN
Status: DISCONTINUED | OUTPATIENT
Start: 2023-12-20 | End: 2023-12-20

## 2023-12-20 RX ORDER — PROPOFOL 10 MG/ML
INJECTION, EMULSION INTRAVENOUS CONTINUOUS PRN
Status: DISCONTINUED | OUTPATIENT
Start: 2023-12-20 | End: 2023-12-20

## 2023-12-20 RX ORDER — ALPRAZOLAM 0.5 MG
0.5 TABLET ORAL ONCE
Status: COMPLETED | OUTPATIENT
Start: 2023-12-20 | End: 2023-12-20

## 2023-12-20 RX ORDER — MAGNESIUM SULFATE 4 G/50ML
4 INJECTION INTRAVENOUS ONCE
Status: COMPLETED | OUTPATIENT
Start: 2023-12-20 | End: 2023-12-20

## 2023-12-20 RX ORDER — VASOPRESSIN 20 U/ML
INJECTION PARENTERAL PRN
Status: DISCONTINUED | OUTPATIENT
Start: 2023-12-20 | End: 2023-12-20 | Stop reason: HOSPADM

## 2023-12-20 RX ORDER — ONDANSETRON 2 MG/ML
INJECTION INTRAMUSCULAR; INTRAVENOUS PRN
Status: DISCONTINUED | OUTPATIENT
Start: 2023-12-20 | End: 2023-12-20

## 2023-12-20 RX ORDER — MORPHINE SULFATE 4 MG/ML
1 INJECTION, SOLUTION INTRAMUSCULAR; INTRAVENOUS
Status: COMPLETED | OUTPATIENT
Start: 2023-12-20 | End: 2023-12-20

## 2023-12-20 RX ORDER — ACETAMINOPHEN 325 MG/1
975 TABLET ORAL ONCE
Status: COMPLETED | OUTPATIENT
Start: 2023-12-20 | End: 2023-12-20

## 2023-12-20 RX ORDER — HYDROMORPHONE HYDROCHLORIDE 1 MG/ML
0.2 INJECTION, SOLUTION INTRAMUSCULAR; INTRAVENOUS; SUBCUTANEOUS EVERY 5 MIN PRN
Status: DISCONTINUED | OUTPATIENT
Start: 2023-12-20 | End: 2023-12-20

## 2023-12-20 RX ORDER — DEXAMETHASONE SODIUM PHOSPHATE 10 MG/ML
INJECTION, SOLUTION INTRAMUSCULAR; INTRAVENOUS PRN
Status: DISCONTINUED | OUTPATIENT
Start: 2023-12-20 | End: 2023-12-20

## 2023-12-20 RX ORDER — IBUPROFEN 200 MG
800 TABLET ORAL ONCE
Status: DISCONTINUED | OUTPATIENT
Start: 2023-12-20 | End: 2023-12-20 | Stop reason: HOSPADM

## 2023-12-20 RX ORDER — ACETAMINOPHEN 325 MG/1
975 TABLET ORAL ONCE
Status: DISCONTINUED | OUTPATIENT
Start: 2023-12-20 | End: 2023-12-20

## 2023-12-20 RX ADMIN — Medication 2 G: at 09:58

## 2023-12-20 RX ADMIN — ONDANSETRON 4 MG: 2 INJECTION INTRAMUSCULAR; INTRAVENOUS at 10:34

## 2023-12-20 RX ADMIN — PHENYLEPHRINE HYDROCHLORIDE 100 MCG: 10 INJECTION INTRAVENOUS at 10:27

## 2023-12-20 RX ADMIN — DEXAMETHASONE SODIUM PHOSPHATE 10 MG: 10 INJECTION, SOLUTION INTRAMUSCULAR; INTRAVENOUS at 10:14

## 2023-12-20 RX ADMIN — MORPHINE SULFATE 1 MG: 4 INJECTION INTRAVENOUS at 12:19

## 2023-12-20 RX ADMIN — MIDAZOLAM 2 MG: 1 INJECTION INTRAMUSCULAR; INTRAVENOUS at 09:56

## 2023-12-20 RX ADMIN — ALBUTEROL SULFATE 2.5 MG: 2.5 SOLUTION RESPIRATORY (INHALATION) at 12:46

## 2023-12-20 RX ADMIN — KETAMINE HYDROCHLORIDE 30 MG: 10 INJECTION INTRAMUSCULAR; INTRAVENOUS at 10:50

## 2023-12-20 RX ADMIN — ACETAMINOPHEN 975 MG: 325 TABLET ORAL at 08:48

## 2023-12-20 RX ADMIN — PROPOFOL 200 MCG/KG/MIN: 10 INJECTION, EMULSION INTRAVENOUS at 10:20

## 2023-12-20 RX ADMIN — ROCURONIUM BROMIDE 50 MG: 50 INJECTION, SOLUTION INTRAVENOUS at 10:14

## 2023-12-20 RX ADMIN — MORPHINE SULFATE 1 MG: 4 INJECTION INTRAVENOUS at 12:24

## 2023-12-20 RX ADMIN — SUGAMMADEX 200 MG: 100 INJECTION, SOLUTION INTRAVENOUS at 11:13

## 2023-12-20 RX ADMIN — SODIUM CHLORIDE, POTASSIUM CHLORIDE, SODIUM LACTATE AND CALCIUM CHLORIDE: 600; 310; 30; 20 INJECTION, SOLUTION INTRAVENOUS at 08:54

## 2023-12-20 RX ADMIN — PROPOFOL 130 MG: 10 INJECTION, EMULSION INTRAVENOUS at 10:14

## 2023-12-20 RX ADMIN — HYDROMORPHONE HYDROCHLORIDE 1 MG: 1 INJECTION, SOLUTION INTRAMUSCULAR; INTRAVENOUS; SUBCUTANEOUS at 10:14

## 2023-12-20 RX ADMIN — MAGNESIUM SULFATE HEPTAHYDRATE 4 G: 80 INJECTION, SOLUTION INTRAVENOUS at 08:53

## 2023-12-20 RX ADMIN — LIDOCAINE HYDROCHLORIDE 50 MG: 10 INJECTION, SOLUTION INFILTRATION; PERINEURAL at 10:14

## 2023-12-20 RX ADMIN — MORPHINE SULFATE 1 MG: 4 INJECTION INTRAVENOUS at 12:14

## 2023-12-20 RX ADMIN — DEXMEDETOMIDINE HYDROCHLORIDE 12 MCG: 100 INJECTION, SOLUTION INTRAVENOUS at 10:40

## 2023-12-20 RX ADMIN — PHENYLEPHRINE HYDROCHLORIDE 100 MCG: 10 INJECTION INTRAVENOUS at 10:32

## 2023-12-20 RX ADMIN — SODIUM CHLORIDE, POTASSIUM CHLORIDE, SODIUM LACTATE AND CALCIUM CHLORIDE: 600; 310; 30; 20 INJECTION, SOLUTION INTRAVENOUS at 11:25

## 2023-12-20 RX ADMIN — APREPITANT 40 MG: 40 CAPSULE ORAL at 08:49

## 2023-12-20 RX ADMIN — ALPRAZOLAM 0.5 MG: 0.5 TABLET ORAL at 14:13

## 2023-12-20 RX ADMIN — MORPHINE SULFATE 1 MG: 4 INJECTION INTRAVENOUS at 12:29

## 2023-12-20 RX ADMIN — ALBUTEROL SULFATE 4 PUFF: 90 AEROSOL, METERED RESPIRATORY (INHALATION) at 10:17

## 2023-12-20 ASSESSMENT — ACTIVITIES OF DAILY LIVING (ADL)
ADLS_ACUITY_SCORE: 35

## 2023-12-20 ASSESSMENT — LIFESTYLE VARIABLES: TOBACCO_USE: 1

## 2023-12-20 NOTE — ANESTHESIA CARE TRANSFER NOTE
Patient: Kary Sebastian    Procedure: Procedure(s):  ROBOTIC ASSISTED LAPAROSCOPY       Diagnosis: Leiomyoma of uterus, unspecified [D25.9]  Diagnosis Additional Information: No value filed.    Anesthesia Type:   General     Note:    Oropharynx: oropharynx clear of all foreign objects  Level of Consciousness: awake  Oxygen Supplementation: face mask  Level of Supplemental Oxygen (L/min / FiO2): 6  Independent Airway: airway patency satisfactory and stable  Dentition: dentition unchanged  Vital Signs Stable: post-procedure vital signs reviewed and stable  Report to RN Given: handoff report given  Patient transferred to: PACU    Handoff Report: Identifed the Patient, Identified the Reponsible Provider, Reviewed the pertinent medical history, Discussed the surgical course, Reviewed Intra-OP anesthesia mangement and issues during anesthesia, Set expectations for post-procedure period and Allowed opportunity for questions and acknowledgement of understanding      Vitals:  Vitals Value Taken Time   /71 12/20/23 1135   Temp 36.1  C (97  F) 12/20/23 1135   Pulse 73 12/20/23 1138   Resp 16 12/20/23 1138   SpO2 100 % 12/20/23 1138   Vitals shown include unfiled device data.    Electronically Signed By: KWAKU Lay CRNA  December 20, 2023  11:39 AM

## 2023-12-20 NOTE — ANESTHESIA POSTPROCEDURE EVALUATION
Patient: Kary Sebastian    Procedure: Procedure(s):  ROBOTIC ASSISTED LAPAROSCOPY       Anesthesia Type:  General    Note:  Disposition: Outpatient   Postop Pain Control: Uneventful            Sign Out: Well controlled pain   PONV: No   Neuro/Psych: Uneventful            Sign Out: Acceptable/Baseline neuro status   Airway/Respiratory: Uneventful            Sign Out: Acceptable/Baseline resp. status   CV/Hemodynamics: Uneventful            Sign Out: Acceptable CV status; No obvious hypovolemia; No obvious fluid overload   Other NRE: NONE   DID A NON-ROUTINE EVENT OCCUR? No           Last vitals:  Vitals Value Taken Time   /68 12/20/23 1300   Temp 36.1  C (97  F) 12/20/23 1135   Pulse 65 12/20/23 1306   Resp 47 12/20/23 1306   SpO2 92 % 12/20/23 1306   Vitals shown include unfiled device data.    Electronically Signed By: Sandy Ray MD  December 20, 2023  2:48 PM

## 2023-12-20 NOTE — ANESTHESIA PROCEDURE NOTES
Airway       Patient location during procedure: OR       Procedure Start/Stop Times: 12/20/2023 10:16 AM  Staff -        CRNA: Lory Castaneda APRN CRNA       Performed By: CRNAIndications and Patient Condition       Indications for airway management: ariadna-procedural       Induction type:intravenous       Mask difficulty assessment: 1 - vent by mask    Final Airway Details       Final airway type: endotracheal airway       Successful airway: ETT - single  Endotracheal Airway Details        ETT size (mm): 7.0       Cuffed: yes       Successful intubation technique: direct laryngoscopy       DL Blade Type: MAC 3       Grade View of Cords: 1       Adjucts: stylet       Position: Right       Measured from: gums/teeth       Secured at (cm): 2    Post intubation assessment        Placement verified by: capnometry, equal breath sounds and chest rise        Number of attempts at approach: 1       Number of other approaches attempted: 0       Secured with: tape       Ease of procedure: easy       Dental guard used and removed. Dental Guard Type: Standard White.    Medication(s) Administered   Medication Administration Time: 12/20/2023 10:16 AM

## 2023-12-20 NOTE — BRIEF OP NOTE
[facility]    Brief Operative Note    Pre-operative diagnosis: Leiomyoma of uterus, unspecified [D25.9]  Post-operative diagnosis Adenomyosis    Procedure: ROBOTIC ASSISTED LAPAROSCOPY, N/A - Abdomen    Surgeon: Surgeon(s) and Role:     * Geremias Lee MD - Primary  Anesthesia: General   Estimated Blood Loss: Minimal    Drains: None  Specimens: * No specimens in log *  Findings:   None.  Complications: None.  Implants: * No implants in log *
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
## 2023-12-27 NOTE — OP NOTE
Patient Name: Kary Sebastian  Patient MRN:[id number]  Patient : 1990    [facility]    Operative Report   Date: 2023    Pre-operative diagnosis: Pelvic pain      Post-operative diagnosis: Same      Procedure: Robotic laparoscopy       Attending Surgeon: Geremias Lee MD        Surgical Assist: JERROD Brito        Anesthesia: General      Estimated Blood Loss: 3 cc      Drains: None      Specimens: None      Findings: Normal pelvis      Complications: None      Implants: None      Immediate postoperative plan: Stable to home      Indication for procedure: This is a 33-year-old female with persistent and severe pelvic pain requiring narcotics.  She had a recent laparoscopy there was a suggestion of a myoma.  The plan today was to proceed with repeat laparoscopy and potential myomectomy.  She was given informed consent for the above the risk benefits and alternatives were discussed at length she expressed understanding and wished to proceed.      Details procedure and intraoperative findings: A preoperative briefing was performed and the patient was taken to the operating room.  She was placed under general anesthesia and then prepped and draped in the dorsolithotomy position.  A timeout was called and the patient and the procedure were verified.  A Sunitha catheter was attached to the cervix a Alicea was placed and attention was directed to the abdomen.    A small supraumbilical incision was made a Veress needle was inserted and the abdomen was insufflated with 3 L of CO2.  An 8 mm trocar and trocar sheath was inserted and a laparoscope was introduced.  2 additional incisions were then made to the right and 1 to the left the appropriate robotic trocars were placed and the patient was placed in steep Trendelenburg.  The robot was docked.    I took my place at the console and the anatomy was identified.Both tubes and ovaries were normal. A small incision was made  in the the fundus were an elevation was noted. The area was infiltrated with dilute Pitressin.No myoma was noted. The incision was approximately 1/8 inch.  The incision was then closed using a baseball type stitch and a V-Loc suture.  A Interceed slushy was placed.  Good hemostasis was noted.  The decision was made to terminate the procedure.    The robot was undocked the trocars removed the incisions were closed with Monocryl and Dermabond.    A debriefing was held and the patient the procedure and the lack of specimens was agreed upon.  The patient was taken to the recovery room in good condition.    Geremias Lee MD                  CC:Geremias Lee MD  Female Pelvic Medicine and Reconstructive Surgery

## 2024-02-24 ENCOUNTER — HEALTH MAINTENANCE LETTER (OUTPATIENT)
Age: 34
End: 2024-02-24

## 2024-03-21 ENCOUNTER — LAB REQUISITION (OUTPATIENT)
Dept: LAB | Facility: CLINIC | Age: 34
End: 2024-03-21

## 2024-03-21 DIAGNOSIS — R53.83 OTHER FATIGUE: ICD-10-CM

## 2024-03-21 LAB
ERYTHROCYTE [DISTWIDTH] IN BLOOD BY AUTOMATED COUNT: 17.5 % (ref 10–15)
HCT VFR BLD AUTO: 37.4 % (ref 35–47)
HGB BLD-MCNC: 12.2 G/DL (ref 11.7–15.7)
MCH RBC QN AUTO: 29 PG (ref 26.5–33)
MCHC RBC AUTO-ENTMCNC: 32.6 G/DL (ref 31.5–36.5)
MCV RBC AUTO: 89 FL (ref 78–100)
PLATELET # BLD AUTO: 300 10E3/UL (ref 150–450)
RBC # BLD AUTO: 4.21 10E6/UL (ref 3.8–5.2)
WBC # BLD AUTO: 6.5 10E3/UL (ref 4–11)

## 2024-03-21 PROCEDURE — 80053 COMPREHEN METABOLIC PANEL: CPT | Performed by: OBSTETRICS & GYNECOLOGY

## 2024-03-21 PROCEDURE — 84443 ASSAY THYROID STIM HORMONE: CPT | Performed by: OBSTETRICS & GYNECOLOGY

## 2024-03-21 PROCEDURE — 85027 COMPLETE CBC AUTOMATED: CPT | Performed by: OBSTETRICS & GYNECOLOGY

## 2024-03-22 LAB
ALBUMIN SERPL BCG-MCNC: 4.3 G/DL (ref 3.5–5.2)
ALP SERPL-CCNC: 47 U/L (ref 40–150)
ALT SERPL W P-5'-P-CCNC: 12 U/L (ref 0–50)
ANION GAP SERPL CALCULATED.3IONS-SCNC: 11 MMOL/L (ref 7–15)
AST SERPL W P-5'-P-CCNC: 18 U/L (ref 0–45)
BILIRUB SERPL-MCNC: <0.2 MG/DL
BUN SERPL-MCNC: 8.6 MG/DL (ref 6–20)
CALCIUM SERPL-MCNC: 9.1 MG/DL (ref 8.6–10)
CHLORIDE SERPL-SCNC: 107 MMOL/L (ref 98–107)
CREAT SERPL-MCNC: 0.66 MG/DL (ref 0.51–0.95)
DEPRECATED HCO3 PLAS-SCNC: 23 MMOL/L (ref 22–29)
EGFRCR SERPLBLD CKD-EPI 2021: >90 ML/MIN/1.73M2
GLUCOSE SERPL-MCNC: 83 MG/DL (ref 70–99)
POTASSIUM SERPL-SCNC: 4 MMOL/L (ref 3.4–5.3)
PROT SERPL-MCNC: 6.4 G/DL (ref 6.4–8.3)
SODIUM SERPL-SCNC: 141 MMOL/L (ref 135–145)
TSH SERPL DL<=0.005 MIU/L-ACNC: 0.81 UIU/ML (ref 0.3–4.2)

## 2024-07-08 ENCOUNTER — LAB REQUISITION (OUTPATIENT)
Dept: LAB | Facility: CLINIC | Age: 34
End: 2024-07-08

## 2024-07-08 DIAGNOSIS — Z13.0 ENCOUNTER FOR SCREENING FOR DISEASES OF THE BLOOD AND BLOOD-FORMING ORGANS AND CERTAIN DISORDERS INVOLVING THE IMMUNE MECHANISM: ICD-10-CM

## 2024-07-08 PROCEDURE — 82728 ASSAY OF FERRITIN: CPT | Performed by: OBSTETRICS & GYNECOLOGY

## 2024-07-08 PROCEDURE — 85041 AUTOMATED RBC COUNT: CPT | Performed by: OBSTETRICS & GYNECOLOGY

## 2024-07-09 ENCOUNTER — TRANSFERRED RECORDS (OUTPATIENT)
Dept: HEALTH INFORMATION MANAGEMENT | Facility: CLINIC | Age: 34
End: 2024-07-09
Payer: COMMERCIAL

## 2024-07-09 LAB
ERYTHROCYTE [DISTWIDTH] IN BLOOD BY AUTOMATED COUNT: 16.5 % (ref 10–15)
FERRITIN SERPL-MCNC: 105 NG/ML (ref 6–175)
HCT VFR BLD AUTO: 37.5 % (ref 35–47)
HGB BLD-MCNC: 12.1 G/DL (ref 11.7–15.7)
MCH RBC QN AUTO: 30.1 PG (ref 26.5–33)
MCHC RBC AUTO-ENTMCNC: 32.3 G/DL (ref 31.5–36.5)
MCV RBC AUTO: 93 FL (ref 78–100)
PLATELET # BLD AUTO: 299 10E3/UL (ref 150–450)
RBC # BLD AUTO: 4.02 10E6/UL (ref 3.8–5.2)
WBC # BLD AUTO: 6.7 10E3/UL (ref 4–11)

## 2024-07-16 RX ORDER — BIOTIN 1 MG
1000 TABLET ORAL DAILY
COMMUNITY

## 2024-07-16 RX ORDER — ESCITALOPRAM OXALATE 10 MG/1
1.5 TABLET ORAL DAILY
COMMUNITY

## 2024-07-16 RX ORDER — DOXYCYCLINE 100 MG/1
100 CAPSULE ORAL 2 TIMES DAILY
COMMUNITY
Start: 2024-01-29

## 2024-07-16 RX ORDER — CLOBETASOL PROPIONATE 0.5 MG/ML
SOLUTION TOPICAL 2 TIMES DAILY
COMMUNITY
Start: 2024-04-25

## 2024-07-19 ENCOUNTER — ANESTHESIA EVENT (OUTPATIENT)
Dept: SURGERY | Facility: CLINIC | Age: 34
End: 2024-07-19
Payer: COMMERCIAL

## 2024-07-19 ENCOUNTER — ANESTHESIA (OUTPATIENT)
Dept: SURGERY | Facility: CLINIC | Age: 34
End: 2024-07-19
Payer: COMMERCIAL

## 2024-07-19 ENCOUNTER — TRANSFERRED RECORDS (OUTPATIENT)
Dept: HEALTH INFORMATION MANAGEMENT | Facility: CLINIC | Age: 34
End: 2024-07-19

## 2024-07-19 ENCOUNTER — HOSPITAL ENCOUNTER (OUTPATIENT)
Facility: CLINIC | Age: 34
Discharge: HOME | End: 2024-07-19
Attending: OBSTETRICS & GYNECOLOGY | Admitting: OBSTETRICS & GYNECOLOGY
Payer: COMMERCIAL

## 2024-07-19 VITALS
RESPIRATION RATE: 16 BRPM | BODY MASS INDEX: 21.69 KG/M2 | HEART RATE: 60 BPM | WEIGHT: 135 LBS | DIASTOLIC BLOOD PRESSURE: 64 MMHG | HEIGHT: 66 IN | SYSTOLIC BLOOD PRESSURE: 97 MMHG | OXYGEN SATURATION: 98 % | TEMPERATURE: 96.9 F

## 2024-07-19 DIAGNOSIS — N80.9 ENDOMETRIOSIS: Primary | ICD-10-CM

## 2024-07-19 LAB
HCG UR QL: NEGATIVE
HGB BLD-MCNC: 12.4 G/DL (ref 11.7–15.7)

## 2024-07-19 PROCEDURE — 250N000011 HC RX IP 250 OP 636: Performed by: NURSE ANESTHETIST, CERTIFIED REGISTERED

## 2024-07-19 PROCEDURE — 999N000141 HC STATISTIC PRE-PROCEDURE NURSING ASSESSMENT: Performed by: OBSTETRICS & GYNECOLOGY

## 2024-07-19 PROCEDURE — 250N000013 HC RX MED GY IP 250 OP 250 PS 637: Performed by: OBSTETRICS & GYNECOLOGY

## 2024-07-19 PROCEDURE — 250N000009 HC RX 250: Performed by: NURSE ANESTHETIST, CERTIFIED REGISTERED

## 2024-07-19 PROCEDURE — 360N000076 HC SURGERY LEVEL 3, PER MIN: Performed by: OBSTETRICS & GYNECOLOGY

## 2024-07-19 PROCEDURE — 88305 TISSUE EXAM BY PATHOLOGIST: CPT | Mod: 26 | Performed by: PATHOLOGY

## 2024-07-19 PROCEDURE — 710N000012 HC RECOVERY PHASE 2, PER MINUTE: Performed by: OBSTETRICS & GYNECOLOGY

## 2024-07-19 PROCEDURE — 81025 URINE PREGNANCY TEST: CPT | Performed by: PHYSICIAN ASSISTANT

## 2024-07-19 PROCEDURE — 88305 TISSUE EXAM BY PATHOLOGIST: CPT | Mod: TC | Performed by: OBSTETRICS & GYNECOLOGY

## 2024-07-19 PROCEDURE — 370N000017 HC ANESTHESIA TECHNICAL FEE, PER MIN: Performed by: OBSTETRICS & GYNECOLOGY

## 2024-07-19 PROCEDURE — 250N000013 HC RX MED GY IP 250 OP 250 PS 637: Performed by: PHYSICIAN ASSISTANT

## 2024-07-19 PROCEDURE — 710N000010 HC RECOVERY PHASE 1, LEVEL 2, PER MIN: Performed by: OBSTETRICS & GYNECOLOGY

## 2024-07-19 PROCEDURE — 258N000003 HC RX IP 258 OP 636: Performed by: ANESTHESIOLOGY

## 2024-07-19 PROCEDURE — 36415 COLL VENOUS BLD VENIPUNCTURE: CPT | Performed by: PHYSICIAN ASSISTANT

## 2024-07-19 PROCEDURE — 85018 HEMOGLOBIN: CPT | Performed by: PHYSICIAN ASSISTANT

## 2024-07-19 PROCEDURE — 272N000001 HC OR GENERAL SUPPLY STERILE: Performed by: OBSTETRICS & GYNECOLOGY

## 2024-07-19 PROCEDURE — 250N000011 HC RX IP 250 OP 636: Performed by: ANESTHESIOLOGY

## 2024-07-19 PROCEDURE — 250N000011 HC RX IP 250 OP 636: Performed by: OBSTETRICS & GYNECOLOGY

## 2024-07-19 RX ORDER — DEXAMETHASONE SODIUM PHOSPHATE 10 MG/ML
INJECTION, SOLUTION INTRAMUSCULAR; INTRAVENOUS PRN
Status: DISCONTINUED | OUTPATIENT
Start: 2024-07-19 | End: 2024-07-19

## 2024-07-19 RX ORDER — NALOXONE HYDROCHLORIDE 0.4 MG/ML
0.1 INJECTION, SOLUTION INTRAMUSCULAR; INTRAVENOUS; SUBCUTANEOUS
Status: DISCONTINUED | OUTPATIENT
Start: 2024-07-19 | End: 2024-07-19 | Stop reason: HOSPADM

## 2024-07-19 RX ORDER — ONDANSETRON 4 MG/1
4 TABLET, ORALLY DISINTEGRATING ORAL EVERY 30 MIN PRN
Status: DISCONTINUED | OUTPATIENT
Start: 2024-07-19 | End: 2024-07-19 | Stop reason: HOSPADM

## 2024-07-19 RX ORDER — ACETAMINOPHEN 325 MG/1
975 TABLET ORAL ONCE
Status: COMPLETED | OUTPATIENT
Start: 2024-07-19 | End: 2024-07-19

## 2024-07-19 RX ORDER — OXYCODONE HYDROCHLORIDE 5 MG/1
5 TABLET ORAL
Status: DISCONTINUED | OUTPATIENT
Start: 2024-07-19 | End: 2024-07-19 | Stop reason: HOSPADM

## 2024-07-19 RX ORDER — DEXAMETHASONE SODIUM PHOSPHATE 4 MG/ML
4 INJECTION, SOLUTION INTRA-ARTICULAR; INTRALESIONAL; INTRAMUSCULAR; INTRAVENOUS; SOFT TISSUE
Status: DISCONTINUED | OUTPATIENT
Start: 2024-07-19 | End: 2024-07-19 | Stop reason: HOSPADM

## 2024-07-19 RX ORDER — SODIUM CHLORIDE, SODIUM LACTATE, POTASSIUM CHLORIDE, CALCIUM CHLORIDE 600; 310; 30; 20 MG/100ML; MG/100ML; MG/100ML; MG/100ML
INJECTION, SOLUTION INTRAVENOUS CONTINUOUS
Status: DISCONTINUED | OUTPATIENT
Start: 2024-07-19 | End: 2024-07-19 | Stop reason: HOSPADM

## 2024-07-19 RX ORDER — OXYCODONE HYDROCHLORIDE 5 MG/1
10 TABLET ORAL
Status: DISCONTINUED | OUTPATIENT
Start: 2024-07-19 | End: 2024-07-19 | Stop reason: HOSPADM

## 2024-07-19 RX ORDER — FENTANYL CITRATE 50 UG/ML
25 INJECTION, SOLUTION INTRAMUSCULAR; INTRAVENOUS EVERY 5 MIN PRN
Status: DISCONTINUED | OUTPATIENT
Start: 2024-07-19 | End: 2024-07-19 | Stop reason: HOSPADM

## 2024-07-19 RX ORDER — CEFAZOLIN SODIUM/WATER 2 G/20 ML
SYRINGE (ML) INTRAVENOUS PRN
Status: DISCONTINUED | OUTPATIENT
Start: 2024-07-19 | End: 2024-07-19

## 2024-07-19 RX ORDER — PROPOFOL 10 MG/ML
INJECTION, EMULSION INTRAVENOUS PRN
Status: DISCONTINUED | OUTPATIENT
Start: 2024-07-19 | End: 2024-07-19

## 2024-07-19 RX ORDER — BUPIVACAINE HYDROCHLORIDE 2.5 MG/ML
INJECTION, SOLUTION INFILTRATION; PERINEURAL
Status: DISCONTINUED
Start: 2024-07-19 | End: 2024-07-19 | Stop reason: HOSPADM

## 2024-07-19 RX ORDER — ACETAMINOPHEN 325 MG/1
650 TABLET ORAL ONCE
Status: DISCONTINUED | OUTPATIENT
Start: 2024-07-19 | End: 2024-07-19 | Stop reason: HOSPADM

## 2024-07-19 RX ORDER — HYDROMORPHONE HCL IN WATER/PF 6 MG/30 ML
0.4 PATIENT CONTROLLED ANALGESIA SYRINGE INTRAVENOUS EVERY 5 MIN PRN
Status: DISCONTINUED | OUTPATIENT
Start: 2024-07-19 | End: 2024-07-19 | Stop reason: HOSPADM

## 2024-07-19 RX ORDER — ONDANSETRON 2 MG/ML
4 INJECTION INTRAMUSCULAR; INTRAVENOUS EVERY 30 MIN PRN
Status: DISCONTINUED | OUTPATIENT
Start: 2024-07-19 | End: 2024-07-19 | Stop reason: HOSPADM

## 2024-07-19 RX ORDER — IBUPROFEN 400 MG/1
800 TABLET, FILM COATED ORAL ONCE
Status: DISCONTINUED | OUTPATIENT
Start: 2024-07-19 | End: 2024-07-19 | Stop reason: HOSPADM

## 2024-07-19 RX ORDER — ONDANSETRON 2 MG/ML
INJECTION INTRAMUSCULAR; INTRAVENOUS PRN
Status: DISCONTINUED | OUTPATIENT
Start: 2024-07-19 | End: 2024-07-19

## 2024-07-19 RX ORDER — CEFAZOLIN SODIUM/WATER 2 G/20 ML
SYRINGE (ML) INTRAVENOUS
Status: DISCONTINUED
Start: 2024-07-19 | End: 2024-07-19 | Stop reason: HOSPADM

## 2024-07-19 RX ORDER — DEXAMETHASONE SODIUM PHOSPHATE 10 MG/ML
4 INJECTION, SOLUTION INTRAMUSCULAR; INTRAVENOUS
Status: DISCONTINUED | OUTPATIENT
Start: 2024-07-19 | End: 2024-07-19 | Stop reason: HOSPADM

## 2024-07-19 RX ORDER — HYDROMORPHONE HYDROCHLORIDE 2 MG/1
2-4 TABLET ORAL EVERY 4 HOURS PRN
Qty: 15 TABLET | Refills: 0 | Status: SHIPPED | OUTPATIENT
Start: 2024-07-19

## 2024-07-19 RX ORDER — FENTANYL CITRATE 50 UG/ML
50 INJECTION, SOLUTION INTRAMUSCULAR; INTRAVENOUS EVERY 5 MIN PRN
Status: DISCONTINUED | OUTPATIENT
Start: 2024-07-19 | End: 2024-07-19 | Stop reason: HOSPADM

## 2024-07-19 RX ORDER — HYDROMORPHONE HCL IN WATER/PF 6 MG/30 ML
0.2 PATIENT CONTROLLED ANALGESIA SYRINGE INTRAVENOUS EVERY 5 MIN PRN
Status: DISCONTINUED | OUTPATIENT
Start: 2024-07-19 | End: 2024-07-19 | Stop reason: HOSPADM

## 2024-07-19 RX ORDER — ACETAMINOPHEN 325 MG/1
975 TABLET ORAL ONCE
Status: DISCONTINUED | OUTPATIENT
Start: 2024-07-19 | End: 2024-07-19 | Stop reason: HOSPADM

## 2024-07-19 RX ORDER — LIDOCAINE 40 MG/G
CREAM TOPICAL
Status: DISCONTINUED | OUTPATIENT
Start: 2024-07-19 | End: 2024-07-19 | Stop reason: HOSPADM

## 2024-07-19 RX ORDER — PROPOFOL 10 MG/ML
INJECTION, EMULSION INTRAVENOUS CONTINUOUS PRN
Status: DISCONTINUED | OUTPATIENT
Start: 2024-07-19 | End: 2024-07-19

## 2024-07-19 RX ORDER — HYDROMORPHONE HYDROCHLORIDE 2 MG/1
2 TABLET ORAL
Status: COMPLETED | OUTPATIENT
Start: 2024-07-19 | End: 2024-07-19

## 2024-07-19 RX ORDER — BUPIVACAINE HYDROCHLORIDE 2.5 MG/ML
INJECTION, SOLUTION INFILTRATION; PERINEURAL PRN
Status: DISCONTINUED | OUTPATIENT
Start: 2024-07-19 | End: 2024-07-19 | Stop reason: HOSPADM

## 2024-07-19 RX ORDER — LIDOCAINE HYDROCHLORIDE 10 MG/ML
INJECTION, SOLUTION INFILTRATION; PERINEURAL PRN
Status: DISCONTINUED | OUTPATIENT
Start: 2024-07-19 | End: 2024-07-19

## 2024-07-19 RX ADMIN — ONDANSETRON 4 MG: 2 INJECTION INTRAMUSCULAR; INTRAVENOUS at 11:11

## 2024-07-19 RX ADMIN — SODIUM CHLORIDE, POTASSIUM CHLORIDE, SODIUM LACTATE AND CALCIUM CHLORIDE: 600; 310; 30; 20 INJECTION, SOLUTION INTRAVENOUS at 12:00

## 2024-07-19 RX ADMIN — MIDAZOLAM 1 MG: 1 INJECTION INTRAMUSCULAR; INTRAVENOUS at 12:48

## 2024-07-19 RX ADMIN — PROPOFOL 200 MG: 10 INJECTION, EMULSION INTRAVENOUS at 11:11

## 2024-07-19 RX ADMIN — ROCURONIUM BROMIDE 40 MG: 10 INJECTION, SOLUTION INTRAVENOUS at 11:11

## 2024-07-19 RX ADMIN — PROPOFOL 200 MCG/KG/MIN: 10 INJECTION, EMULSION INTRAVENOUS at 11:11

## 2024-07-19 RX ADMIN — DEXAMETHASONE SODIUM PHOSPHATE 8 MG: 10 INJECTION, SOLUTION INTRAMUSCULAR; INTRAVENOUS at 11:11

## 2024-07-19 RX ADMIN — SUGAMMADEX 200 MG: 100 INJECTION, SOLUTION INTRAVENOUS at 11:54

## 2024-07-19 RX ADMIN — HYDROMORPHONE HYDROCHLORIDE 0.5 MG: 1 INJECTION, SOLUTION INTRAMUSCULAR; INTRAVENOUS; SUBCUTANEOUS at 11:38

## 2024-07-19 RX ADMIN — Medication 2 G: at 11:00

## 2024-07-19 RX ADMIN — HYDROMORPHONE HYDROCHLORIDE 0.5 MG: 1 INJECTION, SOLUTION INTRAMUSCULAR; INTRAVENOUS; SUBCUTANEOUS at 11:14

## 2024-07-19 RX ADMIN — SODIUM CHLORIDE, POTASSIUM CHLORIDE, SODIUM LACTATE AND CALCIUM CHLORIDE: 600; 310; 30; 20 INJECTION, SOLUTION INTRAVENOUS at 10:35

## 2024-07-19 RX ADMIN — MIDAZOLAM 2 MG: 1 INJECTION INTRAMUSCULAR; INTRAVENOUS at 11:00

## 2024-07-19 RX ADMIN — LIDOCAINE HYDROCHLORIDE 30 MG: 10 INJECTION, SOLUTION INFILTRATION; PERINEURAL at 11:11

## 2024-07-19 RX ADMIN — ROCURONIUM BROMIDE 10 MG: 10 INJECTION, SOLUTION INTRAVENOUS at 11:40

## 2024-07-19 RX ADMIN — PROPOFOL 40 MG: 10 INJECTION, EMULSION INTRAVENOUS at 11:40

## 2024-07-19 RX ADMIN — HYDROMORPHONE HYDROCHLORIDE 2 MG: 2 TABLET ORAL at 14:24

## 2024-07-19 RX ADMIN — ACETAMINOPHEN 650 MG: 325 TABLET ORAL at 10:13

## 2024-07-19 ASSESSMENT — ACTIVITIES OF DAILY LIVING (ADL)
ADLS_ACUITY_SCORE: 18
ADLS_ACUITY_SCORE: 19
ADLS_ACUITY_SCORE: 18
ADLS_ACUITY_SCORE: 18

## 2024-07-19 ASSESSMENT — LIFESTYLE VARIABLES: TOBACCO_USE: 1

## 2024-07-19 NOTE — BRIEF OP NOTE
[facility]    Brief Operative Note    Pre-operative diagnosis: Endometrial polyp [N84.0], pelvic pain  Post-operative diagnosis Same as pre-operative diagnosis, uterine fibroids, endometriosis, pelvic adhesions    Procedure: DIAGNOSTIC LAPAROSCOPY,, N/A - Abdomen  HYSTEROSCOPY DILATION AND CURETTAGE, N/A - Vagina  Lysis of adhesions, cautery of endometriosis  Surgeon: Surgeons and Role:     * Geremias Lee MD - Primary  Anesthesia: General   Estimated Blood Loss: Minimal    Drains: None  Specimens:   ID Type Source Tests Collected by Time Destination   1 : Endometrial Curettings Tissue Endometrium SURGICAL PATHOLOGY EXAM Geremias Lee MD 7/19/2024 11:52 AM      Findings:   None.  Complications: None.  Implants: * No implants in log *

## 2024-07-19 NOTE — ANESTHESIA POSTPROCEDURE EVALUATION
Patient: Kary Sebastian    Procedure: Procedure(s):  DIAGNOSTIC LAPAROSCOPY,  HYSTEROSCOPY DILATION AND CURETTAGE       Anesthesia Type:  General    Note:  Disposition: Inpatient   Postop Pain Control: Uneventful            Sign Out: Well controlled pain   PONV: No   Neuro/Psych: Uneventful            Sign Out: Acceptable/Baseline neuro status   Airway/Respiratory: Uneventful            Sign Out: Acceptable/Baseline resp. status   CV/Hemodynamics: Uneventful            Sign Out: Acceptable CV status; No obvious hypovolemia; No obvious fluid overload   Other NRE:    DID A NON-ROUTINE EVENT OCCUR?        Last vitals:  Vitals Value Taken Time   BP 97/62 07/19/24 1330   Temp 36.1  C (96.9  F) 07/19/24 1217   Pulse 68 07/19/24 1336   Resp 23 07/19/24 1334   SpO2 100 % 07/19/24 1336   Vitals shown include unfiled device data.    Electronically Signed By: Bebeto Mendoza MD  July 19, 2024  1:40 PM

## 2024-07-19 NOTE — LETTER
[facility] EL[facility]AS/PHASE II  [address] [address]  Phone: [phone]  Fax: [phone]    July 19, 2024        Brynn Sebastian  [address]    [place] [address]          To whom it may concern:    RE: Kary B Romulo    Patient was seen and treated today at our clinic and missed work.    Please contact me for questions or concerns.      Sincerely,      Geremias Lee*

## 2024-07-19 NOTE — ANESTHESIA CARE TRANSFER NOTE
Patient: Kary Sebastian    Procedure: Procedure(s):  DIAGNOSTIC LAPAROSCOPY,  HYSTEROSCOPY DILATION AND CURETTAGE       Diagnosis: Endometrial polyp [N84.0]  Diagnosis Additional Information: No value filed.    Anesthesia Type:   General     Note:    Oropharynx: oropharynx clear of all foreign objects and spontaneously breathing  Level of Consciousness: drowsy  Oxygen Supplementation: face mask  Level of Supplemental Oxygen (L/min / FiO2): 6  Independent Airway: airway patency satisfactory and stable  Dentition: dentition unchanged  Vital Signs Stable: post-procedure vital signs reviewed and stable  Report to RN Given: handoff report given  Patient transferred to: PACU    Handoff Report: Identifed the Patient, Identified the Reponsible Provider, Reviewed the pertinent medical history, Discussed the surgical course, Reviewed Intra-OP anesthesia mangement and issues during anesthesia, Set expectations for post-procedure period and Allowed opportunity for questions and acknowledgement of understanding      Vitals:  Vitals Value Taken Time   /68 07/19/24 1217   Temp 36.1  C (96.9  F) 07/19/24 1217   Pulse 81 07/19/24 1219   Resp 28 07/19/24 1219   SpO2 100 % 07/19/24 1219   Vitals shown include unfiled device data.    Electronically Signed By: KWAKU Crocker CRNA  July 19, 2024  12:20 PM

## 2024-07-19 NOTE — PROGRESS NOTES
"Assumed care of pt in PACU.  Pt restless, anxious, with home blanket pulled tightly over entire head. Requesting curtains closed fully.  Describes pain as mild, her main complaint is that she does not \"feel right\" after this surgery, the effects of post anesthesia, and it is making her anxious.  Hx of ESTELA, PTSD, ADHD, is on daily Xanax dosing.    Anesthesia MD had just ordered Versed 1mg IV prior to my arrival.  During hand off RN states pt is receive this Rx and proceed with recovery, with transition to Phase II as pt is ready.    Pt given above,  placed back on O2 and is resting comfortably.  Takes deep breaths with encouragement.    Will progress with recovery path  "

## 2024-07-19 NOTE — ANESTHESIA PROCEDURE NOTES
Airway       Patient location during procedure: OR       Procedure Start/Stop Times: 7/19/2024 11:14 AM  Staff -        Anesthesiologist:  Bebeto Mendoza MD       CRNA: Dhiraj Calvert APRN CRNA       Performed By: CRNAIndications and Patient Condition       Indications for airway management: ariadna-procedural       Induction type:intravenous       Mask difficulty assessment: 1 - vent by mask    Final Airway Details       Final airway type: endotracheal airway       Successful airway: ETT - single  Endotracheal Airway Details        ETT size (mm): 7.5       Cuffed: yes       Successful intubation technique: direct laryngoscopy       DL Blade Type: Caba 2       Grade View of Cords: 1       Adjucts: stylet       Position: Right       Measured from: lips       Secured at (cm): 21       Bite block used: None    Post intubation assessment        Placement verified by: capnometry, equal breath sounds and chest rise        Number of attempts at approach: 1       Number of other approaches attempted: 0       Secured with: tape       Ease of procedure: easy       Dentition: Unchanged and Intact       Dental guard used and removed. Dental Guard Type: Standard White.    Medication(s) Administered   Medication Administration Time: 7/19/2024 11:14 AM
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
## 2024-07-20 NOTE — OP NOTE
PROCEDURE NOTE - LAPAROSCOPY, HYSTEROSCOPY AND DILATION AND CURETTAGE     NAME: Kary Sebastian   :  1990   MRN: [id number]      DATE OF SERVICE: 2024     PREOPERATIVE DIAGNOSIS:   pelvic pain, abnormal uterine bleeding, uterine polyp, history of uterine fibroids, history of endometriosis.    POSTOPERATIVE DIAGNOSIS:   Small uterine fibroids, pelvic adhesions, endometriosis, lush endometrium.    PROCEDURES: laparoscopy, lysis of adhesions, cauterization of endometriosis, hysteroscopy dilatation and curettage.    SURGEON: Geremias Lee MD     ASSISTANT:  Colette Patrick    ANESTHESIA: general     ESTIMATED BLOOD LOSS: * No blood loss amount entered *    HISTORY OF PRESENT ILLNESS:   Kary Sebastian is a 33 year old female with history of uterine fibroids and prior myomectomy.  She presented to the office with progressive pelvic pain and abnormal uterine bleeding.  Imaging suggested a endometrial polyp.    She was given informed consent for the above the risk benefits and alternatives were discussed at length she expressed understanding and wished to proceed.      CONSENT:  She was met preoperatively, where we discussed the procedure and the risks associated with the procedure. She understood these to be, but not limited to injury to adjacent organs including bladder, bowel, ureter, infection and bleeding. Patient signed consent.    PROCEDURE  Patient was  brought back to the operating room in stable condition. Patient underwent induction of a general anesthetic, she was carefully prepped and draped in sterile fashion in the dorsal lithotomy position. Timeout was performed. Bladder was drained with a Alicea catheter, uterine manipulator placed into the uterus.     Attention was turned to the abdomen. An incision above the umbilicus. A Veress needle was introduced with a 2 pop technique, saline drop test confirmed adequate placement. Opening pressure was 3-4. Insufflation was done until 15mm of  "pressure were established. A 5 nonbladed trocar was placed above the umbilicus. Two more port sights were place in the right and left lower quadrants under direct visualization.  The robot was docked.  Trandelenburg assistance was then used.     The procedure began with identifying the anatomy. The uterus was visualized was found to abnormal, several small fibroids were noted, tubes appeared normal, ovaries were  \"normal.  There were endometriotic implants over the right broad ligament and bladder.  There were adhesions to the fundus of the uterus and into the cul-de-sac.    A monopolar scissors was used to take down all the adhesions.  All endometriotic implants were cauterized separately.    The trocars were then removed and the gas was removed from the abdomen. Skin was closed with 4-0 vicryl suture. Steri-Strips applied. Procedure was then turned to the lower field. A bimanual exam was done, demonstrating no adnexal masses. The anterior lip of the cervix was regrasped with single tooth tenaculum. Uterus was then sounded to 9 cm. The cervix was gently dilated using Kristie dilators and the hysteroscope was introduced. Cavity of the uterus was noted to be lush but no discrete polyp was noted. At this point endometrial curettings were obtained. In each case all quadrants were sampled, and the tissue was submitted for pathologic exam. At this point good hemostasis was noted with this portion of the procedure. Instruments were removed from vagina. No active bleeding was noted. Sponge and needle counts were correct X 2. She was taken to recovery in stable condition.    Geremias Lee MD  Urogynecology and Reconstructive Pelvic Surgery      CC: Ashlee Drake, Geremias Lee MD   "

## 2024-08-06 ENCOUNTER — TRANSFERRED RECORDS (OUTPATIENT)
Dept: HEALTH INFORMATION MANAGEMENT | Facility: CLINIC | Age: 34
End: 2024-08-06
Payer: COMMERCIAL

## 2024-08-06 ENCOUNTER — LAB REQUISITION (OUTPATIENT)
Dept: LAB | Facility: CLINIC | Age: 34
End: 2024-08-06

## 2024-08-06 DIAGNOSIS — R30.0 DYSURIA: ICD-10-CM

## 2024-08-06 PROCEDURE — 87086 URINE CULTURE/COLONY COUNT: CPT | Performed by: OBSTETRICS & GYNECOLOGY

## 2024-08-07 LAB — BACTERIA UR CULT: NO GROWTH

## 2024-08-09 ENCOUNTER — TRANSFERRED RECORDS (OUTPATIENT)
Dept: HEALTH INFORMATION MANAGEMENT | Facility: CLINIC | Age: 34
End: 2024-08-09
Payer: COMMERCIAL

## 2024-08-14 ENCOUNTER — HOSPITAL ENCOUNTER (OUTPATIENT)
Facility: HOSPITAL | Age: 34
Discharge: HOME | End: 2024-08-14
Attending: OBSTETRICS & GYNECOLOGY | Admitting: OBSTETRICS & GYNECOLOGY
Payer: COMMERCIAL

## 2024-08-14 ENCOUNTER — ANESTHESIA EVENT (OUTPATIENT)
Dept: SURGERY | Facility: HOSPITAL | Age: 34
End: 2024-08-14
Payer: COMMERCIAL

## 2024-08-14 ENCOUNTER — ANESTHESIA (OUTPATIENT)
Dept: SURGERY | Facility: HOSPITAL | Age: 34
End: 2024-08-14
Payer: COMMERCIAL

## 2024-08-14 VITALS
HEART RATE: 59 BPM | BODY MASS INDEX: 22.44 KG/M2 | RESPIRATION RATE: 18 BRPM | WEIGHT: 139 LBS | SYSTOLIC BLOOD PRESSURE: 105 MMHG | TEMPERATURE: 98.4 F | OXYGEN SATURATION: 93 % | DIASTOLIC BLOOD PRESSURE: 50 MMHG

## 2024-08-14 DIAGNOSIS — N80.03 ADENOMYOSIS: Primary | ICD-10-CM

## 2024-08-14 LAB
ABO/RH(D): NORMAL
ANTIBODY SCREEN: NEGATIVE
BASOPHILS # BLD AUTO: 0 10E3/UL (ref 0–0.2)
BASOPHILS NFR BLD AUTO: 1 %
EOSINOPHIL # BLD AUTO: 0.2 10E3/UL (ref 0–0.7)
EOSINOPHIL NFR BLD AUTO: 4 %
ERYTHROCYTE [DISTWIDTH] IN BLOOD BY AUTOMATED COUNT: 16.2 % (ref 10–15)
HCG UR QL: NEGATIVE
HCT VFR BLD AUTO: 35.7 % (ref 35–47)
HGB BLD-MCNC: 12.1 G/DL (ref 11.7–15.7)
HOLD SPECIMEN: NORMAL
IMM GRANULOCYTES # BLD: 0 10E3/UL
IMM GRANULOCYTES NFR BLD: 0 %
LYMPHOCYTES # BLD AUTO: 1.8 10E3/UL (ref 0.8–5.3)
LYMPHOCYTES NFR BLD AUTO: 37 %
MCH RBC QN AUTO: 30.5 PG (ref 26.5–33)
MCHC RBC AUTO-ENTMCNC: 33.9 G/DL (ref 31.5–36.5)
MCV RBC AUTO: 90 FL (ref 78–100)
MONOCYTES # BLD AUTO: 0.4 10E3/UL (ref 0–1.3)
MONOCYTES NFR BLD AUTO: 8 %
NEUTROPHILS # BLD AUTO: 2.5 10E3/UL (ref 1.6–8.3)
NEUTROPHILS NFR BLD AUTO: 51 %
NRBC # BLD AUTO: 0 10E3/UL
NRBC BLD AUTO-RTO: 0 /100
PLATELET # BLD AUTO: 306 10E3/UL (ref 150–450)
RBC # BLD AUTO: 3.97 10E6/UL (ref 3.8–5.2)
SPECIMEN EXPIRATION DATE: NORMAL
WBC # BLD AUTO: 4.9 10E3/UL (ref 4–11)

## 2024-08-14 PROCEDURE — 999N000141 HC STATISTIC PRE-PROCEDURE NURSING ASSESSMENT: Performed by: OBSTETRICS & GYNECOLOGY

## 2024-08-14 PROCEDURE — 250N000011 HC RX IP 250 OP 636: Performed by: NURSE ANESTHETIST, CERTIFIED REGISTERED

## 2024-08-14 PROCEDURE — 250N000011 HC RX IP 250 OP 636: Performed by: PHYSICIAN ASSISTANT

## 2024-08-14 PROCEDURE — 36415 COLL VENOUS BLD VENIPUNCTURE: CPT | Performed by: PHYSICIAN ASSISTANT

## 2024-08-14 PROCEDURE — 88307 TISSUE EXAM BY PATHOLOGIST: CPT | Mod: TC | Performed by: OBSTETRICS & GYNECOLOGY

## 2024-08-14 PROCEDURE — 250N000013 HC RX MED GY IP 250 OP 250 PS 637: Performed by: ANESTHESIOLOGY

## 2024-08-14 PROCEDURE — 250N000009 HC RX 250: Performed by: ANESTHESIOLOGY

## 2024-08-14 PROCEDURE — 370N000017 HC ANESTHESIA TECHNICAL FEE, PER MIN: Performed by: OBSTETRICS & GYNECOLOGY

## 2024-08-14 PROCEDURE — 710N000009 HC RECOVERY PHASE 1, LEVEL 1, PER MIN: Performed by: OBSTETRICS & GYNECOLOGY

## 2024-08-14 PROCEDURE — 710N000012 HC RECOVERY PHASE 2, PER MINUTE: Performed by: OBSTETRICS & GYNECOLOGY

## 2024-08-14 PROCEDURE — 250N000009 HC RX 250: Performed by: NURSE ANESTHETIST, CERTIFIED REGISTERED

## 2024-08-14 PROCEDURE — 58573 TLH W/T/O UTERUS OVER 250 G: CPT | Performed by: NURSE ANESTHETIST, CERTIFIED REGISTERED

## 2024-08-14 PROCEDURE — 58573 TLH W/T/O UTERUS OVER 250 G: CPT | Performed by: ANESTHESIOLOGY

## 2024-08-14 PROCEDURE — 250N000011 HC RX IP 250 OP 636: Performed by: ANESTHESIOLOGY

## 2024-08-14 PROCEDURE — 250N000025 HC SEVOFLURANE, PER MIN: Performed by: OBSTETRICS & GYNECOLOGY

## 2024-08-14 PROCEDURE — 258N000003 HC RX IP 258 OP 636: Performed by: ANESTHESIOLOGY

## 2024-08-14 PROCEDURE — 81025 URINE PREGNANCY TEST: CPT | Performed by: PHYSICIAN ASSISTANT

## 2024-08-14 PROCEDURE — 360N000080 HC SURGERY LEVEL 7, PER MIN: Performed by: OBSTETRICS & GYNECOLOGY

## 2024-08-14 PROCEDURE — 272N000001 HC OR GENERAL SUPPLY STERILE: Performed by: OBSTETRICS & GYNECOLOGY

## 2024-08-14 PROCEDURE — 88307 TISSUE EXAM BY PATHOLOGIST: CPT | Mod: 26 | Performed by: PATHOLOGY

## 2024-08-14 PROCEDURE — 258N000003 HC RX IP 258 OP 636: Performed by: OBSTETRICS & GYNECOLOGY

## 2024-08-14 PROCEDURE — 86900 BLOOD TYPING SEROLOGIC ABO: CPT | Performed by: PHYSICIAN ASSISTANT

## 2024-08-14 PROCEDURE — 250N000011 HC RX IP 250 OP 636: Performed by: OBSTETRICS & GYNECOLOGY

## 2024-08-14 PROCEDURE — 85025 COMPLETE CBC W/AUTO DIFF WBC: CPT | Performed by: PHYSICIAN ASSISTANT

## 2024-08-14 RX ORDER — ALBUTEROL SULFATE 0.83 MG/ML
2.5 SOLUTION RESPIRATORY (INHALATION) EVERY 4 HOURS PRN
Status: DISCONTINUED | OUTPATIENT
Start: 2024-08-14 | End: 2024-08-14 | Stop reason: HOSPADM

## 2024-08-14 RX ORDER — METRONIDAZOLE 500 MG/100ML
500 INJECTION, SOLUTION INTRAVENOUS
Status: COMPLETED | OUTPATIENT
Start: 2024-08-14 | End: 2024-08-14

## 2024-08-14 RX ORDER — NALOXONE HYDROCHLORIDE 0.4 MG/ML
0.1 INJECTION, SOLUTION INTRAMUSCULAR; INTRAVENOUS; SUBCUTANEOUS
Status: DISCONTINUED | OUTPATIENT
Start: 2024-08-14 | End: 2024-08-14 | Stop reason: HOSPADM

## 2024-08-14 RX ORDER — ALPRAZOLAM 0.5 MG
1 TABLET ORAL ONCE
Status: COMPLETED | OUTPATIENT
Start: 2024-08-14 | End: 2024-08-14

## 2024-08-14 RX ORDER — OXYCODONE HYDROCHLORIDE 5 MG/1
10 TABLET ORAL
Status: DISCONTINUED | OUTPATIENT
Start: 2024-08-14 | End: 2024-08-14 | Stop reason: HOSPADM

## 2024-08-14 RX ORDER — HYDROMORPHONE HYDROCHLORIDE 2 MG/1
2-4 TABLET ORAL EVERY 4 HOURS PRN
Qty: 15 TABLET | Refills: 0 | Status: SHIPPED | OUTPATIENT
Start: 2024-08-14

## 2024-08-14 RX ORDER — ONDANSETRON 2 MG/ML
4 INJECTION INTRAMUSCULAR; INTRAVENOUS EVERY 30 MIN PRN
Status: DISCONTINUED | OUTPATIENT
Start: 2024-08-14 | End: 2024-08-14 | Stop reason: HOSPADM

## 2024-08-14 RX ORDER — KETOROLAC TROMETHAMINE 30 MG/ML
INJECTION, SOLUTION INTRAMUSCULAR; INTRAVENOUS PRN
Status: DISCONTINUED | OUTPATIENT
Start: 2024-08-14 | End: 2024-08-14

## 2024-08-14 RX ORDER — HYDROMORPHONE HYDROCHLORIDE 2 MG/1
2 TABLET ORAL
Status: DISCONTINUED | OUTPATIENT
Start: 2024-08-14 | End: 2024-08-14 | Stop reason: HOSPADM

## 2024-08-14 RX ORDER — ONDANSETRON 2 MG/ML
INJECTION INTRAMUSCULAR; INTRAVENOUS PRN
Status: DISCONTINUED | OUTPATIENT
Start: 2024-08-14 | End: 2024-08-14

## 2024-08-14 RX ORDER — PROPOFOL 10 MG/ML
INJECTION, EMULSION INTRAVENOUS CONTINUOUS PRN
Status: DISCONTINUED | OUTPATIENT
Start: 2024-08-14 | End: 2024-08-14

## 2024-08-14 RX ORDER — ACETAMINOPHEN 325 MG/1
975 TABLET ORAL ONCE
Status: COMPLETED | OUTPATIENT
Start: 2024-08-14 | End: 2024-08-14

## 2024-08-14 RX ORDER — HYDROMORPHONE HCL IN WATER/PF 6 MG/30 ML
0.2 PATIENT CONTROLLED ANALGESIA SYRINGE INTRAVENOUS EVERY 5 MIN PRN
Status: DISCONTINUED | OUTPATIENT
Start: 2024-08-14 | End: 2024-08-14 | Stop reason: HOSPADM

## 2024-08-14 RX ORDER — SODIUM CHLORIDE, SODIUM LACTATE, POTASSIUM CHLORIDE, CALCIUM CHLORIDE 600; 310; 30; 20 MG/100ML; MG/100ML; MG/100ML; MG/100ML
INJECTION, SOLUTION INTRAVENOUS CONTINUOUS
Status: DISCONTINUED | OUTPATIENT
Start: 2024-08-14 | End: 2024-08-14 | Stop reason: HOSPADM

## 2024-08-14 RX ORDER — ACETAMINOPHEN 325 MG/1
975 TABLET ORAL ONCE
Status: DISCONTINUED | OUTPATIENT
Start: 2024-08-14 | End: 2024-08-14 | Stop reason: HOSPADM

## 2024-08-14 RX ORDER — LORAZEPAM 2 MG/ML
0.5 INJECTION INTRAMUSCULAR
Status: DISCONTINUED | OUTPATIENT
Start: 2024-08-14 | End: 2024-08-14 | Stop reason: HOSPADM

## 2024-08-14 RX ORDER — DEXAMETHASONE SODIUM PHOSPHATE 10 MG/ML
INJECTION, SOLUTION INTRAMUSCULAR; INTRAVENOUS PRN
Status: DISCONTINUED | OUTPATIENT
Start: 2024-08-14 | End: 2024-08-14

## 2024-08-14 RX ORDER — IBUPROFEN 200 MG
800 TABLET ORAL ONCE
Status: DISCONTINUED | OUTPATIENT
Start: 2024-08-14 | End: 2024-08-14 | Stop reason: HOSPADM

## 2024-08-14 RX ORDER — IPRATROPIUM BROMIDE AND ALBUTEROL SULFATE 2.5; .5 MG/3ML; MG/3ML
3 SOLUTION RESPIRATORY (INHALATION) ONCE
Status: DISCONTINUED | OUTPATIENT
Start: 2024-08-14 | End: 2024-08-14 | Stop reason: HOSPADM

## 2024-08-14 RX ORDER — ONDANSETRON 4 MG/1
4 TABLET, ORALLY DISINTEGRATING ORAL EVERY 30 MIN PRN
Status: DISCONTINUED | OUTPATIENT
Start: 2024-08-14 | End: 2024-08-14 | Stop reason: HOSPADM

## 2024-08-14 RX ORDER — LIDOCAINE 40 MG/G
CREAM TOPICAL
Status: DISCONTINUED | OUTPATIENT
Start: 2024-08-14 | End: 2024-08-14 | Stop reason: HOSPADM

## 2024-08-14 RX ORDER — DEXAMETHASONE SODIUM PHOSPHATE 4 MG/ML
4 INJECTION, SOLUTION INTRA-ARTICULAR; INTRALESIONAL; INTRAMUSCULAR; INTRAVENOUS; SOFT TISSUE
Status: DISCONTINUED | OUTPATIENT
Start: 2024-08-14 | End: 2024-08-14 | Stop reason: HOSPADM

## 2024-08-14 RX ORDER — DEXAMETHASONE SODIUM PHOSPHATE 10 MG/ML
4 INJECTION, SOLUTION INTRAMUSCULAR; INTRAVENOUS
Status: DISCONTINUED | OUTPATIENT
Start: 2024-08-14 | End: 2024-08-14 | Stop reason: HOSPADM

## 2024-08-14 RX ORDER — CEFAZOLIN SODIUM/WATER 2 G/20 ML
2 SYRINGE (ML) INTRAVENOUS
Status: COMPLETED | OUTPATIENT
Start: 2024-08-14 | End: 2024-08-14

## 2024-08-14 RX ORDER — LABETALOL HYDROCHLORIDE 5 MG/ML
10 INJECTION, SOLUTION INTRAVENOUS
Status: DISCONTINUED | OUTPATIENT
Start: 2024-08-14 | End: 2024-08-14 | Stop reason: HOSPADM

## 2024-08-14 RX ORDER — CEFAZOLIN SODIUM/WATER 2 G/20 ML
2 SYRINGE (ML) INTRAVENOUS SEE ADMIN INSTRUCTIONS
Status: DISCONTINUED | OUTPATIENT
Start: 2024-08-14 | End: 2024-08-14 | Stop reason: HOSPADM

## 2024-08-14 RX ORDER — SODIUM CHLORIDE, SODIUM LACTATE, POTASSIUM CHLORIDE, AND CALCIUM CHLORIDE .6; .31; .03; .02 G/100ML; G/100ML; G/100ML; G/100ML
IRRIGANT IRRIGATION PRN
Status: DISCONTINUED | OUTPATIENT
Start: 2024-08-14 | End: 2024-08-14 | Stop reason: HOSPADM

## 2024-08-14 RX ORDER — PROPOFOL 10 MG/ML
INJECTION, EMULSION INTRAVENOUS PRN
Status: DISCONTINUED | OUTPATIENT
Start: 2024-08-14 | End: 2024-08-14

## 2024-08-14 RX ORDER — LIDOCAINE HYDROCHLORIDE 10 MG/ML
INJECTION, SOLUTION INFILTRATION; PERINEURAL PRN
Status: DISCONTINUED | OUTPATIENT
Start: 2024-08-14 | End: 2024-08-14

## 2024-08-14 RX ORDER — MAGNESIUM SULFATE 4 G/50ML
4 INJECTION INTRAVENOUS ONCE
Status: COMPLETED | OUTPATIENT
Start: 2024-08-14 | End: 2024-08-14

## 2024-08-14 RX ORDER — OXYCODONE HYDROCHLORIDE 5 MG/1
5 TABLET ORAL
Status: DISCONTINUED | OUTPATIENT
Start: 2024-08-14 | End: 2024-08-14 | Stop reason: HOSPADM

## 2024-08-14 RX ORDER — EPHEDRINE SULFATE 50 MG/ML
INJECTION, SOLUTION INTRAMUSCULAR; INTRAVENOUS; SUBCUTANEOUS PRN
Status: DISCONTINUED | OUTPATIENT
Start: 2024-08-14 | End: 2024-08-14

## 2024-08-14 RX ORDER — HYDROMORPHONE HCL IN WATER/PF 6 MG/30 ML
0.4 PATIENT CONTROLLED ANALGESIA SYRINGE INTRAVENOUS EVERY 5 MIN PRN
Status: DISCONTINUED | OUTPATIENT
Start: 2024-08-14 | End: 2024-08-14 | Stop reason: HOSPADM

## 2024-08-14 RX ORDER — MEPERIDINE HYDROCHLORIDE 25 MG/ML
12.5 INJECTION INTRAMUSCULAR; INTRAVENOUS; SUBCUTANEOUS EVERY 5 MIN PRN
Status: DISCONTINUED | OUTPATIENT
Start: 2024-08-14 | End: 2024-08-14 | Stop reason: HOSPADM

## 2024-08-14 RX ORDER — BUPIVACAINE HYDROCHLORIDE 2.5 MG/ML
INJECTION, SOLUTION INFILTRATION; PERINEURAL PRN
Status: DISCONTINUED | OUTPATIENT
Start: 2024-08-14 | End: 2024-08-14 | Stop reason: HOSPADM

## 2024-08-14 RX ADMIN — PROPOFOL 200 MG: 10 INJECTION, EMULSION INTRAVENOUS at 14:26

## 2024-08-14 RX ADMIN — SODIUM CHLORIDE, POTASSIUM CHLORIDE, SODIUM LACTATE AND CALCIUM CHLORIDE: 600; 310; 30; 20 INJECTION, SOLUTION INTRAVENOUS at 15:08

## 2024-08-14 RX ADMIN — SODIUM CHLORIDE, POTASSIUM CHLORIDE, SODIUM LACTATE AND CALCIUM CHLORIDE: 600; 310; 30; 20 INJECTION, SOLUTION INTRAVENOUS at 14:15

## 2024-08-14 RX ADMIN — MIDAZOLAM HYDROCHLORIDE 1 MG: 1 INJECTION, SOLUTION INTRAMUSCULAR; INTRAVENOUS at 14:03

## 2024-08-14 RX ADMIN — HYDROMORPHONE HYDROCHLORIDE 0.5 MG: 1 INJECTION, SOLUTION INTRAMUSCULAR; INTRAVENOUS; SUBCUTANEOUS at 15:30

## 2024-08-14 RX ADMIN — METRONIDAZOLE 500 MG: 5 INJECTION, SOLUTION INTRAVENOUS at 14:06

## 2024-08-14 RX ADMIN — DEXAMETHASONE SODIUM PHOSPHATE 5 MG: 10 INJECTION, SOLUTION INTRAMUSCULAR; INTRAVENOUS at 14:51

## 2024-08-14 RX ADMIN — ALBUTEROL SULFATE 2.5 MG: 2.5 SOLUTION RESPIRATORY (INHALATION) at 16:23

## 2024-08-14 RX ADMIN — ALPRAZOLAM 1 MG: 0.5 TABLET ORAL at 18:30

## 2024-08-14 RX ADMIN — ONDANSETRON 4 MG: 2 INJECTION INTRAMUSCULAR; INTRAVENOUS at 18:01

## 2024-08-14 RX ADMIN — ROCURONIUM BROMIDE 50 MG: 50 INJECTION, SOLUTION INTRAVENOUS at 14:26

## 2024-08-14 RX ADMIN — ONDANSETRON 4 MG: 2 INJECTION INTRAMUSCULAR; INTRAVENOUS at 15:22

## 2024-08-14 RX ADMIN — MAGNESIUM SULFATE HEPTAHYDRATE 4 G: 80 INJECTION, SOLUTION INTRAVENOUS at 13:57

## 2024-08-14 RX ADMIN — DEXMEDETOMIDINE HYDROCHLORIDE 4 MCG: 100 INJECTION, SOLUTION INTRAVENOUS at 15:31

## 2024-08-14 RX ADMIN — Medication 10 MG: at 14:54

## 2024-08-14 RX ADMIN — ACETAMINOPHEN 975 MG: 325 TABLET ORAL at 14:09

## 2024-08-14 RX ADMIN — HYDROMORPHONE HYDROCHLORIDE 0.2 MG: 0.2 INJECTION, SOLUTION INTRAMUSCULAR; INTRAVENOUS; SUBCUTANEOUS at 16:02

## 2024-08-14 RX ADMIN — MIDAZOLAM HYDROCHLORIDE 1 MG: 1 INJECTION, SOLUTION INTRAMUSCULAR; INTRAVENOUS at 14:10

## 2024-08-14 RX ADMIN — DEXMEDETOMIDINE HYDROCHLORIDE 8 MCG: 100 INJECTION, SOLUTION INTRAVENOUS at 15:06

## 2024-08-14 RX ADMIN — PROPOFOL 100 MCG/KG/MIN: 10 INJECTION, EMULSION INTRAVENOUS at 14:32

## 2024-08-14 RX ADMIN — SUGAMMADEX 200 MG: 100 INJECTION, SOLUTION INTRAVENOUS at 15:30

## 2024-08-14 RX ADMIN — DEXMEDETOMIDINE HYDROCHLORIDE 8 MCG: 100 INJECTION, SOLUTION INTRAVENOUS at 14:49

## 2024-08-14 RX ADMIN — KETOROLAC TROMETHAMINE 30 MG: 30 INJECTION, SOLUTION INTRAMUSCULAR at 15:22

## 2024-08-14 RX ADMIN — LIDOCAINE HYDROCHLORIDE 5 ML: 10 INJECTION, SOLUTION INFILTRATION; PERINEURAL at 14:26

## 2024-08-14 RX ADMIN — Medication 5 MG: at 14:49

## 2024-08-14 RX ADMIN — HYDROMORPHONE HYDROCHLORIDE 0.2 MG: 0.2 INJECTION, SOLUTION INTRAMUSCULAR; INTRAVENOUS; SUBCUTANEOUS at 16:31

## 2024-08-14 RX ADMIN — HYDROMORPHONE HYDROCHLORIDE 1 MG: 1 INJECTION, SOLUTION INTRAMUSCULAR; INTRAVENOUS; SUBCUTANEOUS at 14:15

## 2024-08-14 RX ADMIN — Medication 2 G: at 14:32

## 2024-08-14 ASSESSMENT — ACTIVITIES OF DAILY LIVING (ADL)
ADLS_ACUITY_SCORE: 30

## 2024-08-14 ASSESSMENT — LIFESTYLE VARIABLES: TOBACCO_USE: 1
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
## 2024-08-14 NOTE — ANESTHESIA CARE TRANSFER NOTE
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    Anesthesia Type:   General     Note:    Oropharynx: oropharynx clear of all foreign objects  Level of Consciousness: drowsy  Oxygen Supplementation: face mask  Level of Supplemental Oxygen (L/min / FiO2): 6  Independent Airway: airway patency satisfactory and stable  Dentition: dentition unchanged  Vital Signs Stable: post-procedure vital signs reviewed and stable  Report to RN Given: handoff report given  Patient transferred to: PACU    Handoff Report: Identifed the Patient, Identified the Reponsible Provider, Reviewed the pertinent medical history, Discussed the surgical course, Reviewed Intra-OP anesthesia mangement and issues during anesthesia, Set expectations for post-procedure period and Allowed opportunity for questions and acknowledgement of understanding      Vitals:  Vitals Value Taken Time   /72 08/14/24 1548   Temp 97.8    Pulse 77 08/14/24 1551   Resp 12 08/14/24 1551   SpO2 97 % 08/14/24 1551   Vitals shown include unfiled device data.    Electronically Signed By: KWAKU Ricardo CRNA  August 14, 2024  3:52 PM

## 2024-08-14 NOTE — BRIEF OP NOTE
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  Complications: None.  Implants: * No implants in log *

## 2024-08-14 NOTE — ANESTHESIA PROCEDURE NOTES
Airway         Procedure Start/Stop Times: 8/14/2024 2:29 PM  Staff -        CRNA: Aden Asif APRN CRNA       Performed By: CRNAIndications and Patient Condition       Indications for airway management: ariadna-procedural       Induction type:intravenous       Mask difficulty assessment: 2 - vent by mask + OA or adjuvant +/- NMBA    Final Airway Details       Final airway type: endotracheal airway       Successful airway: ETT - single  Endotracheal Airway Details        ETT size (mm): 7.0       Cuffed: yes       Successful intubation technique: video laryngoscopy       VL Blade Size: Glidescope 3       Grade View of Cords: 1       Adjucts: stylet       Position: Right       Measured from: lips       Secured at (cm): 21       Bite block used: None    Post intubation assessment        Placement verified by: capnometry, equal breath sounds and chest rise        Number of attempts at approach: 1       Number of other approaches attempted: 0       Secured with: tape       Ease of procedure: easy       Dentition: Intact    Medication(s) Administered   Medication Administration Time: 8/14/2024 2:29 PM

## 2024-08-15 NOTE — ANESTHESIA POSTPROCEDURE EVALUATION
Patient: Kary Sebastian    Procedure: Procedure(s):  ROBOT ASSISTED LAPAROSCOPIC SUPRACERVICAL HYSTERECTOMY WITH BILATERAL SALPINGECTOMY       Anesthesia Type:  General    Note:  Disposition: Outpatient   Postop Pain Control: Uneventful            Sign Out: Well controlled pain   PONV: No   Neuro/Psych: Uneventful            Sign Out: Acceptable/Baseline neuro status   Airway/Respiratory: Uneventful            Sign Out: Acceptable/Baseline resp. status   CV/Hemodynamics: Uneventful            Sign Out: Acceptable CV status; No obvious hypovolemia; No obvious fluid overload   Other NRE: NONE   DID A NON-ROUTINE EVENT OCCUR? No    Event details/Postop Comments:  Patient had significant anxiety which was treated xanax in PACU. Patient also had mild asthmatic attacks which were treated with a duoneb.           Last vitals:  Vitals Value Taken Time   /54 08/14/24 1645   Temp 30.1  C (86.18  F) 08/14/24 1606   Pulse 73 08/14/24 1657   Resp 27 08/14/24 1606   SpO2 95 % 08/14/24 1657   Vitals shown include unfiled device data.    Electronically Signed By: Eyal Garces MD  August 14, 2024  8:34 PM

## 2024-08-16 NOTE — OP NOTE
[facility]    Kary Sebastian  BD: 1990  MRN: [id number]    Preop Dx: Adenomyosis, uterine fibroids      PROCEDURE:  Robotic laparoscopic supracervical hysterectomy, bilateral salpingectomy    SURGEON:  Geremias Lee MD    ASSISTANT(S):  JERROD Brito      ANESTHESIA:  General    ESTIMATED BLOOD LOSS: 10,  replaced with lactated Ringer's.    COMPLICATIONS:  None    TISSUE REMOVED:  uterus and bilateral salpingectomy    DRAINS:None    HISTORY OF PRESENT ILLNESS  This is a 33 year old female with the above diagnosis. She desired definitive therapy. She was extensive informed consent with regards to the above. The risks, benefits, and alternatives were discussed. She expressed an understanding and wished to proceed.     PROCEDURE NOTE   The patient was brought to the operating room and after induction of general anesthesia, was prepped and draped in the dorsal lithotomy position. A timeout was called and the patient and the procedure were verified. A Alicea was placed and a weighted speculum was then placed. The single tooth tenculum was clamped to the anterior cervix and a sound was placed in the uterus.      Attention was directed to the abdomen where a small infraumbilical incision was made. A Veress needle was inserted. The abdomen was insufflated with 3 liters of CO2 and a 12 mm trocar and trocar sheath was inserted. The laparoscope was introduced. The uterus was noted to be irregular. Both adnexa were completely normal. Upper abdomen was normal. Two additional incisions were made lateral to the umbilical incision and robotic ports and assist port were placed. The patient was placed in steep Trendelenburg.     I then took my place at the console. The pelvis was inspected with findings as noted above. Both round ligaments were cauterized and divided and both the anterior and posterior leaves of the broad ligament were opened with further identification of the  ureters. The uterine suspensory ligaments were then cauterized and incised using the [place] forceps and the monopolar scissors. Additional parametrial bites were obtained. A bladder flap was opened in the usual manner and with upward traction the bladder was then dissected free of the cervix and upper vagina. Once this was accomplished, the monopolar scissors was used to make an incision between the junction of the uterus and cervix.     The lower field was removed and the fourth arm was used to elevate the uterus. Once detachment of the uterus was accomplished, the cervix was oversewn using an 0 Vicryl  suture with excellent effect and excellent hemostasis. Copious amounts of irrigation were then used. Good hemostasis was noted. The robot was then undocked and the morcellator was introduced through the umbilical port. A 5 mm camera was then used to identify multiple passes, so that the specimen could be removed. All small portions of the tissue were also removed and submitted for pathologic examination. Copious amounts of irrigation were used. Good hemostasis was noted and the decision was made to terminate the procedure. Fascia was closed in the usual manner. Skin was closed with Monocryl. Sponge and instrument counts were correct. My assistant, JERROD Brito  , was present and helped throughout the procedure. The patient was taken to the recovery room in good condition.     Geremias Lee MD    CC: Geremias Lee MD

## 2024-08-21 ENCOUNTER — PATIENT OUTREACH (OUTPATIENT)
Dept: MIDWIFE SERVICES | Facility: CLINIC | Age: 34
End: 2024-08-21
Payer: COMMERCIAL

## 2024-09-19 NOTE — TELEPHONE ENCOUNTER
Patient due for Pap and HPV.    Reminder done today via telephone call-spoke to the pt she thought she already had this done and said that she would check into it further. I'm not seeing one since 09/11/23 and informed her this.

## 2025-02-05 ENCOUNTER — LAB REQUISITION (OUTPATIENT)
Dept: LAB | Facility: CLINIC | Age: 35
End: 2025-02-05

## 2025-02-05 DIAGNOSIS — R53.82 CHRONIC FATIGUE, UNSPECIFIED: ICD-10-CM

## 2025-02-05 LAB
ALBUMIN SERPL BCG-MCNC: 4.6 G/DL (ref 3.5–5.2)
ALP SERPL-CCNC: 54 U/L (ref 40–150)
ALT SERPL W P-5'-P-CCNC: 24 U/L (ref 0–50)
ANION GAP SERPL CALCULATED.3IONS-SCNC: 13 MMOL/L (ref 7–15)
AST SERPL W P-5'-P-CCNC: 20 U/L (ref 0–45)
BILIRUB SERPL-MCNC: 0.5 MG/DL
BUN SERPL-MCNC: 9.6 MG/DL (ref 6–20)
CALCIUM SERPL-MCNC: 9.3 MG/DL (ref 8.8–10.4)
CHLORIDE SERPL-SCNC: 105 MMOL/L (ref 98–107)
CREAT SERPL-MCNC: 0.7 MG/DL (ref 0.51–0.95)
EGFRCR SERPLBLD CKD-EPI 2021: >90 ML/MIN/1.73M2
FERRITIN SERPL-MCNC: 177 NG/ML (ref 6–175)
GLUCOSE SERPL-MCNC: 93 MG/DL (ref 70–99)
HCO3 SERPL-SCNC: 20 MMOL/L (ref 22–29)
POTASSIUM SERPL-SCNC: 3.9 MMOL/L (ref 3.4–5.3)
PROT SERPL-MCNC: 7.1 G/DL (ref 6.4–8.3)
SODIUM SERPL-SCNC: 138 MMOL/L (ref 135–145)
TSH SERPL DL<=0.005 MIU/L-ACNC: 1.82 UIU/ML (ref 0.3–4.2)

## 2025-02-05 PROCEDURE — 80053 COMPREHEN METABOLIC PANEL: CPT | Performed by: NURSE PRACTITIONER

## 2025-02-05 PROCEDURE — 84443 ASSAY THYROID STIM HORMONE: CPT | Performed by: NURSE PRACTITIONER

## 2025-02-05 PROCEDURE — 82728 ASSAY OF FERRITIN: CPT | Performed by: NURSE PRACTITIONER

## 2025-03-09 ENCOUNTER — HEALTH MAINTENANCE LETTER (OUTPATIENT)
Age: 35
End: 2025-03-09

## (undated) DEVICE — MAT FLOOR SURGICAL 40X38 0702140238

## (undated) DEVICE — SYR 50ML LL W/O NDL 309653

## (undated) DEVICE — GLOVE SURG PI ULTRA TOUCH M SZ 8 LF

## (undated) DEVICE — SU VICRYL+ 0 27 UR6 VLT VCP603H

## (undated) DEVICE — SOL NACL 0.9% IRRIG 1000ML BOTTLE 2F7124

## (undated) DEVICE — PROTECTOR ARM STANDARD ONE STEP

## (undated) DEVICE — SYR 10ML FINGER CONTROL W/O NDL 309695

## (undated) DEVICE — PAD POS XL 1X20X40IN PINK PIGAZZI

## (undated) DEVICE — PREP CHLORAPREP 26ML TINTED HI-LITE ORANGE 930815

## (undated) DEVICE — SOL WATER IRRIG 1000ML BOTTLE 2F7114

## (undated) DEVICE — TUBING SMOKE EVAC PNEUMOCLEAR HIGH FLOW 0620050250

## (undated) DEVICE — CUSTOM PACK PELVISCOPY SMA5BPVHEA

## (undated) DEVICE — PACK MINOR SINGLE BASIN SSK3001

## (undated) DEVICE — GLOVE SURG PI ULTRA TOUCH M SZ 7-1/2 LF

## (undated) DEVICE — DECANTER VIAL 2006S

## (undated) DEVICE — ENDO TROCAR FIRST ENTRY KII FIOS Z-THRD 05X100MM CTF03

## (undated) DEVICE — MORCELLATOR LAPAROSCOPIC LINA XCISE MOR-1515-6

## (undated) DEVICE — GLOVE SURG PI ULTRA TOUCH M SZ 7 LF 42670

## (undated) DEVICE — CUSTOM PACK DA VINCI GYN SMA5BDVHEA

## (undated) DEVICE — GOWN XXL 9575

## (undated) DEVICE — Device

## (undated) DEVICE — TUBING FILTER TRI-LUMEN AIRSEAL ASC-EVAC1

## (undated) DEVICE — BLADE KNIFE SURG 11 371111

## (undated) DEVICE — PREP POVIDONE-IODINE 10% SOLUTION 4OZ BOTTLE MDS093944

## (undated) DEVICE — BRIEF STRETCH XL MPS40

## (undated) DEVICE — DRAPE SHEET TABLE COVER KC 42301*

## (undated) DEVICE — GOWN IMPERVIOUS BREATHABLE SMART XLG 89045

## (undated) DEVICE — DRSG TELFA 3X4" 1050

## (undated) DEVICE — SU WND CLOSURE VLOC 180 ABS 0 9" GS-21 VLOCL0346

## (undated) DEVICE — SYRINGE 10ML FILL SALINE FLUSH STERILE 306553

## (undated) DEVICE — SYR 50ML CATH TIP W/O NDL 309620

## (undated) DEVICE — CATH FOLEY 16FR 5ML LUBRICATH LATEX 0165L16

## (undated) DEVICE — DAVINCI XI DRAPE ARM 470015

## (undated) DEVICE — SU CHROMIC 0 CT 36" 914H

## (undated) DEVICE — SU ETHILON 4-0 PS-2 18" BLACK 1667H

## (undated) DEVICE — SYSTEM LAPAROVUE VISIBILITY LAPVUE10

## (undated) DEVICE — ENDO OBTURATOR ACCESS PORT BLADELESS 8X100MM IAS8-100LP

## (undated) DEVICE — ANTIFOG SOLUTION SEE SHARP 150M TROCAR SWABS 30978

## (undated) DEVICE — PLATE GROUNDING ADULT W/CORD 9165L

## (undated) DEVICE — KIT PROCEDURE FLUENT IN/OUT FLOWPAK TISS TRAP FLT-112S

## (undated) DEVICE — NEEDLE HYPO 2" X 21GA 305129

## (undated) DEVICE — LUBRICANT INST ELECTROLUBE EL101

## (undated) DEVICE — UTERINE MANIPULATOR RUMI 6.7MMX8CM UMB678

## (undated) DEVICE — PACK MAJOR BASIN 673

## (undated) DEVICE — SUCTION MANIFOLD NEPTUNE 2 SYS 1 PORT 702-025-000

## (undated) DEVICE — SYR 10ML PREFILLED 0.9% NACL INJ NOT STERILE 306547

## (undated) DEVICE — SEAL SET MYOSURE ROD LENS SCOPE SINGLE USE 40-902

## (undated) DEVICE — SOLUTION IRRIG 2B7127 .9NS 3000ML BAG

## (undated) DEVICE — GLOVE SURG PI ULTRA TOUCH M SZ 6-1/2 LF

## (undated) DEVICE — NDL INSUFFLATION 13GA 120MM C2201

## (undated) DEVICE — DAVINCI XI SEAL UNIVERSAL 5-8MM 470361

## (undated) DEVICE — SU MONOCRYL 3-0 PS-2 18" UND MCP497G

## (undated) DEVICE — PREP DYNA-HEX 4% CHG SCRUB 4OZ BOTTLE MDS098710

## (undated) DEVICE — SUTURE MONOCRYL 3-0 18 PS2 UND MCP497G

## (undated) DEVICE — SUCTION STRYKERFLOW II 250-070-500

## (undated) DEVICE — SU DERMABOND ADVANCED .7ML DNX12

## (undated) DEVICE — GLOVE PI ULTRATCH M LF SZ 6.5 PF CUFF TEXT STRL LF 42665

## (undated) DEVICE — PREP POVIDONE-IODINE 7.5% SCRUB 4OZ BOTTLE MDS093945

## (undated) DEVICE — SUCTION MANIFOLD NEPTUNE 2 SYS 4 PORT 0702-020-000

## (undated) DEVICE — SUTURE VICRYL+ 3-0 18IN PS-2 UND VCP497H

## (undated) DEVICE — SUTURE VICRYL+ 3-0 27IN CT-1 UND VCP258H

## (undated) DEVICE — NEEDLE SPINAL DISP 22X4-3/4 QUIN 333315

## (undated) DEVICE — SU WND CLOSURE VLOC 180 ABS 0 12" GS-21 VLOCL0316

## (undated) DEVICE — DAVINCI XI SEAL UNIVERSAL 5-12MM 470500

## (undated) DEVICE — SUTURE PDS 0 60IN CTX+ LOOPED PDP990G

## (undated) DEVICE — DAVINCI XI OBTURATOR BLADELESS 8MM 470359

## (undated) DEVICE — DRAPE U SPLIT 74X120" 29440

## (undated) DEVICE — DAVINCI XI DRAPE COLUMN 470341

## (undated) DEVICE — SYR 50ML SLIP TIP W/O NDL 309654

## (undated) DEVICE — BLADE CLIPPER PIVOT PURPLE DISP 9660

## (undated) DEVICE — DRAPE MAYO STAND 29.5X55.5" 8339

## (undated) DEVICE — BARRIER SEPRAFILM 5X6" SINGLE SHEET 4301-02

## (undated) DEVICE — DRESSING MEPILEX BORDER POST-OP 4X10

## (undated) DEVICE — GOWN IMPERVIOUS BREATHABLE SMART LG 89015

## (undated) DEVICE — ELECTRODE PATIENT RETURN ADULT L10 FT 2 PLATE CORD 0855C

## (undated) DEVICE — CUSTOM PACK C-SECTION LHE

## (undated) DEVICE — GOWN IMPERVIOUS BREATHABLE 2XL/XLONG

## (undated) DEVICE — DAVINCI HOT SHEARS TIP COVER  400180

## (undated) DEVICE — STPL SKIN SUBCUTICULAR INSORB  2030

## (undated) RX ORDER — LIDOCAINE HYDROCHLORIDE 10 MG/ML
INJECTION, SOLUTION EPIDURAL; INFILTRATION; INTRACAUDAL; PERINEURAL
Status: DISPENSED
Start: 2024-07-19

## (undated) RX ORDER — ONDANSETRON 2 MG/ML
INJECTION INTRAMUSCULAR; INTRAVENOUS
Status: DISPENSED
Start: 2023-12-20

## (undated) RX ORDER — LIDOCAINE HYDROCHLORIDE 10 MG/ML
INJECTION, SOLUTION EPIDURAL; INFILTRATION; INTRACAUDAL; PERINEURAL
Status: DISPENSED
Start: 2023-10-25

## (undated) RX ORDER — PROPOFOL 10 MG/ML
INJECTION, EMULSION INTRAVENOUS
Status: DISPENSED
Start: 2023-12-20

## (undated) RX ORDER — EPHEDRINE SULFATE 50 MG/ML
INJECTION, SOLUTION INTRAMUSCULAR; INTRAVENOUS; SUBCUTANEOUS
Status: DISPENSED
Start: 2024-08-14

## (undated) RX ORDER — ONDANSETRON 2 MG/ML
INJECTION INTRAMUSCULAR; INTRAVENOUS
Status: DISPENSED
Start: 2023-06-13

## (undated) RX ORDER — FENTANYL CITRATE 50 UG/ML
INJECTION, SOLUTION INTRAMUSCULAR; INTRAVENOUS
Status: DISPENSED
Start: 2023-10-25

## (undated) RX ORDER — BUPIVACAINE HYDROCHLORIDE 2.5 MG/ML
INJECTION, SOLUTION INFILTRATION; PERINEURAL
Status: DISPENSED
Start: 2024-08-14

## (undated) RX ORDER — KETOROLAC TROMETHAMINE 30 MG/ML
INJECTION, SOLUTION INTRAMUSCULAR; INTRAVENOUS
Status: DISPENSED
Start: 2024-08-14

## (undated) RX ORDER — MORPHINE SULFATE 1 MG/ML
INJECTION, SOLUTION EPIDURAL; INTRATHECAL; INTRAVENOUS
Status: DISPENSED
Start: 2023-06-13

## (undated) RX ORDER — VASOPRESSIN 20 U/ML
INJECTION PARENTERAL
Status: DISPENSED
Start: 2023-12-20

## (undated) RX ORDER — GLYCOPYRROLATE 0.2 MG/ML
INJECTION, SOLUTION INTRAMUSCULAR; INTRAVENOUS
Status: DISPENSED
Start: 2023-10-25

## (undated) RX ORDER — PROPOFOL 10 MG/ML
INJECTION, EMULSION INTRAVENOUS
Status: DISPENSED
Start: 2023-10-25

## (undated) RX ORDER — FENTANYL CITRATE-0.9 % NACL/PF 10 MCG/ML
PLASTIC BAG, INJECTION (ML) INTRAVENOUS
Status: DISPENSED
Start: 2024-07-19

## (undated) RX ORDER — ONDANSETRON 2 MG/ML
INJECTION INTRAMUSCULAR; INTRAVENOUS
Status: DISPENSED
Start: 2024-07-19

## (undated) RX ORDER — DEXAMETHASONE SODIUM PHOSPHATE 10 MG/ML
INJECTION, SOLUTION INTRAMUSCULAR; INTRAVENOUS
Status: DISPENSED
Start: 2024-08-14

## (undated) RX ORDER — LIDOCAINE HYDROCHLORIDE 10 MG/ML
INJECTION, SOLUTION EPIDURAL; INFILTRATION; INTRACAUDAL; PERINEURAL
Status: DISPENSED
Start: 2023-12-20

## (undated) RX ORDER — PROPOFOL 10 MG/ML
INJECTION, EMULSION INTRAVENOUS
Status: DISPENSED
Start: 2024-07-19

## (undated) RX ORDER — FENTANYL CITRATE-0.9 % NACL/PF 10 MCG/ML
PLASTIC BAG, INJECTION (ML) INTRAVENOUS
Status: DISPENSED
Start: 2023-06-13

## (undated) RX ORDER — BUPIVACAINE HYDROCHLORIDE 2.5 MG/ML
INJECTION, SOLUTION EPIDURAL; INFILTRATION; INTRACAUDAL
Status: DISPENSED
Start: 2023-10-25

## (undated) RX ORDER — DEXAMETHASONE SODIUM PHOSPHATE 10 MG/ML
INJECTION, SOLUTION INTRAMUSCULAR; INTRAVENOUS
Status: DISPENSED
Start: 2023-12-20

## (undated) RX ORDER — PHENYLEPHRINE HYDROCHLORIDE 10 MG/ML
INJECTION INTRAVENOUS
Status: DISPENSED
Start: 2023-12-20

## (undated) RX ORDER — BUPIVACAINE HYDROCHLORIDE 2.5 MG/ML
INJECTION, SOLUTION INFILTRATION; PERINEURAL
Status: DISPENSED
Start: 2023-12-20

## (undated) RX ORDER — ONDANSETRON 2 MG/ML
INJECTION INTRAMUSCULAR; INTRAVENOUS
Status: DISPENSED
Start: 2024-08-14